# Patient Record
Sex: FEMALE | Race: WHITE | NOT HISPANIC OR LATINO | Employment: OTHER | URBAN - METROPOLITAN AREA
[De-identification: names, ages, dates, MRNs, and addresses within clinical notes are randomized per-mention and may not be internally consistent; named-entity substitution may affect disease eponyms.]

---

## 2017-02-02 ENCOUNTER — APPOINTMENT (OUTPATIENT)
Dept: RADIOLOGY | Facility: MEDICAL CENTER | Age: 67
End: 2017-02-02
Attending: EMERGENCY MEDICINE
Payer: COMMERCIAL

## 2017-02-02 ENCOUNTER — HOSPITAL ENCOUNTER (EMERGENCY)
Facility: MEDICAL CENTER | Age: 67
End: 2017-02-02
Attending: EMERGENCY MEDICINE
Payer: COMMERCIAL

## 2017-02-02 VITALS
HEART RATE: 105 BPM | WEIGHT: 150 LBS | TEMPERATURE: 97.7 F | HEIGHT: 65 IN | BODY MASS INDEX: 24.99 KG/M2 | OXYGEN SATURATION: 93 %

## 2017-02-02 DIAGNOSIS — F10.920 ACUTE ALCOHOL INTOXICATION, UNCOMPLICATED (HCC): ICD-10-CM

## 2017-02-02 LAB
ALBUMIN SERPL BCP-MCNC: 4.2 G/DL (ref 3.2–4.9)
ALBUMIN/GLOB SERPL: 1.1 G/DL
ALP SERPL-CCNC: 108 U/L (ref 30–99)
ALT SERPL-CCNC: 28 U/L (ref 2–50)
AMMONIA PLAS-SCNC: 30 UMOL/L (ref 11–45)
ANION GAP SERPL CALC-SCNC: 17 MMOL/L (ref 0–11.9)
AST SERPL-CCNC: 47 U/L (ref 12–45)
BASOPHILS # BLD AUTO: 0.8 % (ref 0–1.8)
BASOPHILS # BLD: 0.1 K/UL (ref 0–0.12)
BILIRUB SERPL-MCNC: 0.4 MG/DL (ref 0.1–1.5)
BUN SERPL-MCNC: 29 MG/DL (ref 8–22)
CALCIUM SERPL-MCNC: 8.9 MG/DL (ref 8.5–10.5)
CHLORIDE SERPL-SCNC: 104 MMOL/L (ref 96–112)
CO2 SERPL-SCNC: 16 MMOL/L (ref 20–33)
CREAT SERPL-MCNC: 1.2 MG/DL (ref 0.5–1.4)
EOSINOPHIL # BLD AUTO: 0.21 K/UL (ref 0–0.51)
EOSINOPHIL NFR BLD: 1.7 % (ref 0–6.9)
ERYTHROCYTE [DISTWIDTH] IN BLOOD BY AUTOMATED COUNT: 43.8 FL (ref 35.9–50)
ETHANOL BLD-MCNC: 0.34 G/DL
FLUAV H1 2009 PAND RNA SPEC QL NAA+PROBE: NOT DETECTED
FLUAV RNA SPEC QL NAA+PROBE: NEGATIVE
FLUAV+FLUBV AG SPEC QL IA: NORMAL
FLUBV RNA SPEC QL NAA+PROBE: NEGATIVE
GFR SERPL CREATININE-BSD FRML MDRD: 45 ML/MIN/1.73 M 2
GLOBULIN SER CALC-MCNC: 3.8 G/DL (ref 1.9–3.5)
GLUCOSE SERPL-MCNC: 86 MG/DL (ref 65–99)
HCT VFR BLD AUTO: 46.1 % (ref 37–47)
HGB BLD-MCNC: 15.8 G/DL (ref 12–16)
IMM GRANULOCYTES # BLD AUTO: 0.04 K/UL (ref 0–0.11)
IMM GRANULOCYTES NFR BLD AUTO: 0.3 % (ref 0–0.9)
LACTATE BLD-SCNC: 2 MMOL/L (ref 0.5–2)
LYMPHOCYTES # BLD AUTO: 5.04 K/UL (ref 1–4.8)
LYMPHOCYTES NFR BLD: 41 % (ref 22–41)
MCH RBC QN AUTO: 34.9 PG (ref 27–33)
MCHC RBC AUTO-ENTMCNC: 34.3 G/DL (ref 33.6–35)
MCV RBC AUTO: 101.8 FL (ref 81.4–97.8)
MONOCYTES # BLD AUTO: 0.93 K/UL (ref 0–0.85)
MONOCYTES NFR BLD AUTO: 7.6 % (ref 0–13.4)
NEUTROPHILS # BLD AUTO: 5.96 K/UL (ref 2–7.15)
NEUTROPHILS NFR BLD: 48.6 % (ref 44–72)
NRBC # BLD AUTO: 0 K/UL
NRBC BLD AUTO-RTO: 0 /100 WBC
PLATELET # BLD AUTO: 224 K/UL (ref 164–446)
PMV BLD AUTO: 10.3 FL (ref 9–12.9)
POTASSIUM SERPL-SCNC: 3.9 MMOL/L (ref 3.6–5.5)
PROT SERPL-MCNC: 8 G/DL (ref 6–8.2)
RBC # BLD AUTO: 4.53 M/UL (ref 4.2–5.4)
SIGNIFICANT IND 70042: NORMAL
SITE SITE: NORMAL
SODIUM SERPL-SCNC: 137 MMOL/L (ref 135–145)
SOURCE SOURCE: NORMAL
TROPONIN I SERPL-MCNC: <0.01 NG/ML (ref 0–0.04)
WBC # BLD AUTO: 12.3 K/UL (ref 4.8–10.8)

## 2017-02-02 PROCEDURE — 82140 ASSAY OF AMMONIA: CPT

## 2017-02-02 PROCEDURE — 83605 ASSAY OF LACTIC ACID: CPT

## 2017-02-02 PROCEDURE — 99284 EMERGENCY DEPT VISIT MOD MDM: CPT

## 2017-02-02 PROCEDURE — 85025 COMPLETE CBC W/AUTO DIFF WBC: CPT

## 2017-02-02 PROCEDURE — 70450 CT HEAD/BRAIN W/O DYE: CPT

## 2017-02-02 PROCEDURE — 36415 COLL VENOUS BLD VENIPUNCTURE: CPT

## 2017-02-02 PROCEDURE — 71010 DX-CHEST-PORTABLE (1 VIEW): CPT

## 2017-02-02 PROCEDURE — 87503 INFLUENZA DNA AMP PROB ADDL: CPT

## 2017-02-02 PROCEDURE — 80053 COMPREHEN METABOLIC PANEL: CPT

## 2017-02-02 PROCEDURE — 80307 DRUG TEST PRSMV CHEM ANLYZR: CPT

## 2017-02-02 PROCEDURE — 87502 INFLUENZA DNA AMP PROBE: CPT

## 2017-02-02 PROCEDURE — 87400 INFLUENZA A/B EACH AG IA: CPT

## 2017-02-02 PROCEDURE — 84484 ASSAY OF TROPONIN QUANT: CPT

## 2017-02-02 ASSESSMENT — LIFESTYLE VARIABLES: DO YOU DRINK ALCOHOL: YES

## 2017-02-02 NOTE — ED AVS SNAPSHOT
After Visit Summary                                                                                                                Shannon Balbuena   MRN: [id number]    Department:  [facility], Emergency Dept   Date of Visit:  2/2/2017            [facility], Emergency Dept    [address]    [facility] [address]    Phone:  [phone]      You were seen by     Horacio Pina M.D.      Your Diagnosis Was     Acute alcohol intoxication, uncomplicated (CMS-HCC)     F10.120       Follow-up Information     1. Follow up with Edvin BREEN M.D..    Specialty:  Family Medicine    Contact information    [address]  [facility] [address] [phone]        Medication Information     Review all of your home medications and newly ordered medications with your primary doctor and/or pharmacist as soon as possible. Follow medication instructions as directed by your doctor and/or pharmacist.     Please keep your complete medication list with you and share with your physician. Update the information when medications are discontinued, doses are changed, or new medications (including over-the-counter products) are added; and carry medication information at all times in the event of emergency situations.               Medication List      ASK your doctor about these medications        Instructions    carvedilol 12.5 MG Tabs   Commonly known as:  COREG    Take 12.5 mg by mouth every morning.   Dose:  12.5 mg       ciprofloxacin 500 MG Tabs   Commonly known as:  CIPRO    Take 1 Tab by mouth 2 times a day.   Dose:  500 mg       diazepam 5 MG Tabs   Commonly known as:  VALIUM    Doctor's comments:  No Driving or alcohol with medication given.   Take 1 Tab by mouth every 6 hours as needed (muscle spasm).   Dose:  5 mg       ibuprofen 600 MG Tabs   Commonly known as:  MOTRIN    Take 1 Tab by mouth every 6 hours as needed.   Dose:  600 mg       levothyroxine 112 MCG Tabs      Commonly known as:  SYNTHROID    Take 1 Tab by mouth every day.   Dose:  112 mcg       oxycodone-acetaminophen 7.5-325 MG per tablet   Commonly known as:  PERCOCET    Doctor's comments:  No Driving or alcohol with medication given.   Take 1 Tab by mouth every four hours as needed.   Dose:  1 Tab       * prednisoLONE acetate 1 % Susp   Commonly known as:  PRED FORTE    Place 1 Drop in right eye 3 times a day.   Dose:  1 Drop       * prednisoLONE acetate 1 % Susp   Commonly known as:  PRED FORTE    Place 1 Drop in left eye 4 times a day.   Dose:  1 Drop       * Notice:  This list has 2 medication(s) that are the same as other medications prescribed for you. Read the directions carefully, and ask your doctor or other care provider to review them with you.            Procedures and tests performed during your visit     AMMONIA    CBC WITH DIFFERENTIAL    COMP METABOLIC PANEL    CT-HEAD W/O    DIAGNOSTIC ALCOHOL    DX-CHEST-PORTABLE (1 VIEW)    ESTIMATED GFR    IV Saline Lock    Influenza By PCR, A/B/H1N1    Influenza Rapid    LACTIC ACID    TROPONIN        Discharge Instructions       Alcohol Intoxication  Alcohol intoxication occurs when the amount of alcohol that a person has consumed impairs his or her ability to mentally and physically function. Alcohol directly impairs the normal chemical activity of the brain. Drinking large amounts of alcohol can lead to changes in mental function and behavior, and it can cause many physical effects that can be harmful.   Alcohol intoxication can range in severity from mild to very severe. Various factors can affect the level of intoxication that occurs, such as the person's age, gender, weight, frequency of alcohol consumption, and the presence of other medical conditions (such as diabetes, seizures, or heart conditions). Dangerous levels of alcohol intoxication may occur when people drink large amounts of alcohol in a short period (binge drinking). Alcohol can also be  "especially dangerous when combined with certain prescription medicines or \"recreational\" drugs.  SIGNS AND SYMPTOMS  Some common signs and symptoms of mild alcohol intoxication include:  · Loss of coordination.  · Changes in mood and behavior.  · Impaired judgment.  · Slurred speech.  As alcohol intoxication progresses to more severe levels, other signs and symptoms will appear. These may include:  · Vomiting.  · Confusion and impaired memory.  · Slowed breathing.  · Seizures.  · Loss of consciousness.  DIAGNOSIS   Your health care provider will take a medical history and perform a physical exam. You will be asked about the amount and type of alcohol you have consumed. Blood tests will be done to measure the concentration of alcohol in your blood. In many places, your blood alcohol level must be lower than 80 mg/dL (0.08%) to legally drive. However, many dangerous effects of alcohol can occur at much lower levels.   TREATMENT   People with alcohol intoxication often do not require treatment. Most of the effects of alcohol intoxication are temporary, and they go away as the alcohol naturally leaves the body. Your health care provider will monitor your condition until you are stable enough to go home. Fluids are sometimes given through an IV access tube to help prevent dehydration.   HOME CARE INSTRUCTIONS  · Do not drive after drinking alcohol.  · Stay hydrated. Drink enough water and fluids to keep your urine clear or pale yellow. Avoid caffeine.    · Only take over-the-counter or prescription medicines as directed by your health care provider.    SEEK MEDICAL CARE IF:   · You have persistent vomiting.    · You do not feel better after a few days.  · You have frequent alcohol intoxication. Your health care provider can help determine if you should see a substance use treatment counselor.  SEEK IMMEDIATE MEDICAL CARE IF:   · You become shaky or tremble when you try to stop drinking.    · You shake uncontrollably " (seizure).    · You throw up (vomit) blood. This may be bright red or may look like black coffee grounds.    · You have blood in your stool. This may be bright red or may appear as a black, tarry, bad smelling stool.    · You become lightheaded or faint.    MAKE SURE YOU:   · Understand these instructions.  · Will watch your condition.  · Will get help right away if you are not doing well or get worse.     This information is not intended to replace advice given to you by your health care provider. Make sure you discuss any questions you have with your health care provider.     Document Released: 09/27/2006 Document Revised: 08/20/2014 Document Reviewed: 05/23/2014  2[employer] Interactive Patient Education ©2016 [employer]            Patient Information     Patient Information    Following emergency treatment: all patient requiring follow-up care must return either to a private physician or a clinic if your condition worsens before you are able to obtain further medical attention, please return to the emergency room.     Billing Information    At [facility], we work to make the billing process streamlined for our patients.  Our Representatives are here to answer any questions you may have regarding your hospital bill.  If you have insurance coverage and have supplied your insurance information to us, we will submit a claim to your insurer on your behalf.  Should you have any questions regarding your bill, we can be reached online or by phone as follows:  Online: You are able pay your bills online or live chat with our representatives about any billing questions you may have. We are here to help Monday - Friday from 8:00am to 7:30pm and 9:00am - 12:00pm on Saturdays.  Please visit [email or web address]  for more information.   Phone:  [phone] or [phone]    Please note that your emergency physician, surgeon, pathologist, radiologist, anesthesiologist, and other  specialists are not employed by [facility] and will therefore bill separately for their services.  Please contact them directly for any questions concerning their bills at the numbers below:     Emergency Physician Services:  [phone]  [facility]:  [phone]  [facility]:  [phone]  [facility]:  [phone]    1. Your final bill may vary from the amount quoted upon discharge if all procedures are not complete at that time, or if your doctor has additional procedures of which we are not aware. You will receive an additional bill if you return to the Emergency Department at [facility] for suture removal regardless of the facility of which the sutures were placed.     2. Please arrange for settlement of this account at the emergency registration.    3. All self-pay accounts are due in full at the time of treatment.  If you are unable to meet this obligation then payment is expected within 4-5 days.     4. If you have had radiology studies (CT, X-ray, Ultrasound, MRI), you have received a preliminary result during your emergency department visit. Please contact the radiology department [phone] to receive a copy of your final result. Please discuss the Final result with your primary physician or with the follow up physician provided.     Crisis Hotline:  [place] Crisis Hotline:  2-014-ZMXOWZU or 1-497.214.1140  [place] Crisis Hotline:    1-156.644.1237 or 534-822-6955         ED Discharge Follow Up Questions    1. In order to provide you with very good care, we would like to follow up with a phone call in the next few days.  May we have your permission to contact you?     YES /  NO    2. What is the best phone number to call you? (       )_____-__________    3. What is the best time to call you?      Morning  /  Afternoon  /  Evening                   Patient Signature:  ____________________________________________________________    Date:   ____________________________________________________________

## 2017-02-02 NOTE — ED AVS SNAPSHOT
Thumb Reading Access Code: [email or web address]  Expires: 3/4/2017 11:23 PM    Thumb Reading  A secure, online tool to manage your health information     [employer]’s Thumb Reading® is a secure, online tool that connects you to your personalized health information from the privacy of your home -- day or night - making it very easy for you to manage your healthcare. Once the activation process is completed, you can even access your medical information using the Thumb Reading yvette, which is available for free in the Apple Yvette store or Google Play store.     Thumb Reading provides the following levels of access (as shown below):   My Chart Features   [facility] Primary Care Doctor [facility]  Specialists [facility]  Urgent  Care Non-[facility]  Primary Care  Doctor   Email your healthcare team securely and privately 24/7 X X X X   Manage appointments: schedule your next appointment; view details of past/upcoming appointments X      Request prescription refills. X      View recent personal medical records, including lab and immunizations X X X X   View health record, including health history, allergies, medications X X X X   Read reports about your outpatient visits, procedures, consult and ER notes X X X X   See your discharge summary, which is a recap of your hospital and/or ER visit that includes your diagnosis, lab results, and care plan. X X       How to register for Thumb Reading:  1. Go to  [email or web address].  2. Click on the Sign Up Now box, which takes you to the New Member Sign Up page. You will need to provide the following information:  a. Enter your Thumb Reading Access Code exactly as it appears at the top of this page. (You will not need to use this code after you’ve completed the sign-up process. If you do not sign up before the expiration date, you must request a new code.)   b. Enter your date of birth.   c. Enter your home email address.   d. Click Submit, and follow the next screen’s instructions.  3. Create a Thumb Reading ID. This will be your Thumb Reading  login ID and cannot be changed, so think of one that is secure and easy to remember.  4. Create a Modbook password. You can change your password at any time.  5. Enter your Password Reset Question and Answer. This can be used at a later time if you forget your password.   6. Enter your e-mail address. This allows you to receive e-mail notifications when new information is available in Modbook.  7. Click Sign Up. You can now view your health information.    For assistance activating your Modbook account, call [phone]

## 2017-02-02 NOTE — ED AVS SNAPSHOT
2/2/2017          Shannon Balbuena  [address] [place] [address]  [place] [address]    Dear Shannon:    [facility] wants to ensure your discharge home is safe and you or your loved ones have had all your questions answered regarding your care after you leave the hospital.    You may receive a telephone call within two days of your discharge.  This call is to make certain you understand your discharge instructions as well as ensure we provided you with the best care possible during your stay with us.     The call will only last approximately 3-5 minutes and will be done by a nurse.    Once again, we want to ensure your discharge home is safe and that you have a clear understanding of any next steps in your care.  If you have any questions or concerns, please do not hesitate to contact us, we are here for you.  Thank you for choosing [facility] for your healthcare needs.    Sincerely,    Nolberto Santoyo    [facility]

## 2017-02-03 NOTE — ED PROVIDER NOTES
"ED Provider Note    Scribed for Dr. Horacio Pina M.D. by Ramonita Chicas. 2/2/2017  9:12 PM    Primary care provider: Edvin BREEN M.D.  Means of arrival: Ambulance  History obtained from: Patient, EMS  History limited by: ALOC    CHIEF COMPLAINT  Chief Complaint   Patient presents with   • ALOC     Had about 6 shots then became nonverbal and confused   • Alcohol Intoxication       HPI  Shannon Balbuean is a 66 y.o. female who presents to the Emergency Department in an altered level of consciousness. Per report, the patient had about 6 shots and then became very confused. Upon evaluation, the patient states, \"it's ok\" in response to most questions. She denies shortness of breath. She states that she does not think she is confused. When asked if she knows where she is, the patient states, \"I'm right here\". she denies pain.  reports that the patient began vomiting tonight. She became confused quite suddenly.  does not think that the patient had a seizure. Per , the patient is normally alert and oriented x4 at baseline. She has not been sick recently.     Further details of the HPI are limited secondary to the altered level of consciousness of the patient.      REVIEW OF SYSTEMS  Pertinent positives include altered level of consciousness, vomiting. Pertinent negatives include no pain. See HPI for further details.     Further details of the ROS are limited secondary to the altered level of consciousness of the patient.      PAST MEDICAL HISTORY   has a past medical history of Thyroid disease; Arthritis; Hepatitis C; Unspecified cataract; Seasonal allergies; HTN (hypertension); High cholesterol; and At high risk for falls.    SURGICAL HISTORY   has past surgical history that includes breast biopsy; thyroidectomy; hip arthroplasty total (2007); cataract phaco with iol (6/25/2014); and cataract phaco with iol (7/9/2014).    SOCIAL HISTORY  Social History   Substance Use Topics   • " "Smoking status: Former Smoker -- 1.00 packs/day     Quit date: 04/01/2013   • Smokeless tobacco: Never Used   • Alcohol Use: Yes      Comment: 6x week      History   Drug Use No       FAMILY HISTORY  Noncontributory.      CURRENT MEDICATIONS  Home Medications     Reviewed by Enma Wang R.N. (Registered Nurse) on 02/02/17 at 2043  Med List Status: Unable to Obtain    Medication Last Dose Status    carvedilol (COREG) 12.5 MG TABS Not Taking Active    ciprofloxacin (CIPRO) 500 MG TABS Not Taking Active    diazepam (VALIUM) 5 MG TABS  Active    ibuprofen (MOTRIN) 600 MG TABS  Active    levothyroxine (SYNTHROID) 112 MCG TABS Taking Active    oxycodone-acetaminophen (PERCOCET) 7.5-325 MG per tablet  Active    prednisoLONE acetate (PRED FORTE) 1 % SUSP Not Taking Active    prednisoLONE acetate (PRED FORTE) 1 % SUSP Not Taking Active                ALLERGIES  Allergies   Allergen Reactions   • Codeine Rash       PHYSICAL EXAM  VITAL SIGNS: Temp(Src) 36.5 °C (97.7 °F)  Ht 1.651 m (5' 5\")  Wt 68.04 kg (150 lb)  BMI 24.96 kg/m2  SpO2 91%    Constitutional: Well developed, Well nourished, No distress, Non-toxic appearance.   HENT: Normocephalic, Atraumatic, Bilateral external ears normal, Oropharynx moist, No oral exudates.   Eyes: PERRLA, EOMI, Conjunctiva normal, No discharge.   Neck: No tenderness, Supple, No stridor.   Lymphatic: No lymphadenopathy noted.   Cardiovascular: Normal heart rate, Normal rhythm.   Thorax & Lungs: Coarse breath sounds and rhonchi in the right upper lobe. No respiratory distress, No wheezing, No crackles.   Abdomen: Soft, No tenderness, No masses, No pulsatile masses.   Skin: Warm, Dry, No erythema, No rash.   Extremities:, No edema No cyanosis.   Musculoskeletal: No tenderness to palpation or major deformities noted.  Intact distal pulses  Neurologic: Awake, alert. Confused to time and place. Moves all extremities spontaneously.  Psychiatric: Unable to assess.     LABS  Results for " orders placed or performed during the hospital encounter of 02/02/17   CBC WITH DIFFERENTIAL   Result Value Ref Range    WBC 12.3 (H) 4.8 - 10.8 K/uL    RBC 4.53 4.20 - 5.40 M/uL    Hemoglobin 15.8 12.0 - 16.0 g/dL    Hematocrit 46.1 37.0 - 47.0 %    .8 (H) 81.4 - 97.8 fL    MCH 34.9 (H) 27.0 - 33.0 pg    MCHC 34.3 33.6 - 35.0 g/dL    RDW 43.8 35.9 - 50.0 fL    Platelet Count 224 164 - 446 K/uL    MPV 10.3 9.0 - 12.9 fL    Neutrophils-Polys 48.60 44.00 - 72.00 %    Lymphocytes 41.00 22.00 - 41.00 %    Monocytes 7.60 0.00 - 13.40 %    Eosinophils 1.70 0.00 - 6.90 %    Basophils 0.80 0.00 - 1.80 %    Immature Granulocytes 0.30 0.00 - 0.90 %    Nucleated RBC 0.00 /100 WBC    Neutrophils (Absolute) 5.96 2.00 - 7.15 K/uL    Lymphs (Absolute) 5.04 (H) 1.00 - 4.80 K/uL    Monos (Absolute) 0.93 (H) 0.00 - 0.85 K/uL    Eos (Absolute) 0.21 0.00 - 0.51 K/uL    Baso (Absolute) 0.10 0.00 - 0.12 K/uL    Immature Granulocytes (abs) 0.04 0.00 - 0.11 K/uL    NRBC (Absolute) 0.00 K/uL   COMP METABOLIC PANEL   Result Value Ref Range    Sodium 137 135 - 145 mmol/L    Potassium 3.9 3.6 - 5.5 mmol/L    Chloride 104 96 - 112 mmol/L    Co2 16 (L) 20 - 33 mmol/L    Anion Gap 17.0 (H) 0.0 - 11.9    Glucose 86 65 - 99 mg/dL    Bun 29 (H) 8 - 22 mg/dL    Creatinine 1.20 0.50 - 1.40 mg/dL    Calcium 8.9 8.5 - 10.5 mg/dL    AST(SGOT) 47 (H) 12 - 45 U/L    ALT(SGPT) 28 2 - 50 U/L    Alkaline Phosphatase 108 (H) 30 - 99 U/L    Total Bilirubin 0.4 0.1 - 1.5 mg/dL    Albumin 4.2 3.2 - 4.9 g/dL    Total Protein 8.0 6.0 - 8.2 g/dL    Globulin 3.8 (H) 1.9 - 3.5 g/dL    A-G Ratio 1.1 g/dL   TROPONIN   Result Value Ref Range    Troponin I <0.01 0.00 - 0.04 ng/mL   Influenza Rapid   Result Value Ref Range    Significant Indicator NEG     Source RESP     Site RESPIRATORY     Rapid Influenza A-B       Negative for Influenza A and Influenza B antigens.  Infection due to influenza A or B cannot be ruled out  since the antigen present in the specimen  may be below the  detection limit of the test. Culture confirmation of  negative samples is recommended.     LACTIC ACID   Result Value Ref Range    Lactic Acid 2.0 0.5 - 2.0 mmol/L   AMMONIA   Result Value Ref Range    Ammonia 30 11 - 45 umol/L   DIAGNOSTIC ALCOHOL   Result Value Ref Range    Diagnostic Alcohol 0.34 (H) 0.00 g/dL   ESTIMATED GFR   Result Value Ref Range    GFR If  54 (A) >60 mL/min/1.73 m 2    GFR If Non African American 45 (A) >60 mL/min/1.73 m 2   All labs reviewed by me.    RADIOLOGY  CT-HEAD W/O   Final Result      1.  Diffuse atrophy and white matter changes.   2.  No acute intracranial hemorrhage or territorial infarct.         DX-CHEST-PORTABLE (1 VIEW)   Final Result      Hypoinflation without other evidence for acute cardiopulmonary disease.      The radiologist's interpretation of all radiological studies have been reviewed by me.    COURSE & MEDICAL DECISION MAKING  Pertinent Labs & Imaging studies reviewed. (See chart for details)    9:12 PM - Patient seen and examined at bedside. Ordered for a head CT, chest XR, and labs to evaluate her symptoms. The differential diagnoses include but are not limited to: aspiration pneumonia, alcohol intoxication. The plan of care was discussed with the patient's  and I answered all of his questions at this time. The patient's  understands and is agreeable with this plan of care.      10:24 PM Patient reevaluated at bedside. Discussed that the patient is quite intoxicated. Per , she quit smoking about 5 years ago. The patient has no chronic lung problems. She will remain for observation.     11:17 PM  requests to take the patient home. She will be road tested prior to discharge. Discussed plan for discharge; I advised the patient to return to the [facility] ED with any new or worsening symptoms. Patient was given the opportunity for questions. I addressed all questions or concerns at this time and they verbalize  "agreement to the plan of care. Review of vital signs: Pulse 105  Temp(Src) 36.5 °C (97.7 °F)  Ht 1.651 m (5' 5\")  Wt 68.04 kg (150 lb)  BMI 24.96 kg/m2  SpO2 93%     Decision Making:  Think this patient's altered mental status secondary to alcohol intoxication and she does not appear to have any acute medical illness at this time she is gradually improving from a mental status standpoint. Initially had some concern she might have aspirated however chest x-ray is clear she remains non-hypoxic with no cough or any symptoms of aspiration pneumonia at this point think she can be discharged home with her  given instructions related to alcohol abuse    The patient will return for new or worsening symptoms and is stable at the time of discharge.    The patient is referred to a primary physician for blood pressure management, diabetic screening, and for all other preventative health concerns.    DISPOSITION:  Patient will be discharged home in stable condition.    FOLLOW UP:  Edvin BREEN M.D.  [address] [facility] [address]  [phone]        FINAL IMPRESSION  1. Acute alcohol intoxication, uncomplicated (CMS-[facility])       Ramonita GROVER (Kaydenibbull), am scribing for, and in the presence of, Horacio Pina M.D..    Electronically signed by: Ramonita Chicas ([place]), 2/2/2017    Horacio GROVER M.D. personally performed the services described in this documentation, as scribed by Ramonita Chicas in my presence, and it is both accurate and complete.    The note accurately reflects work and decisions made by me.  Horacio Pina  2/3/2017  2:03 AM      "

## 2017-02-03 NOTE — ED NOTES
Pt given discharge instructions, follow up care. Pt verbalized understanding. RN to answer and questions pt and family had. Pt instructed not to drive or operate heavy machinery after drinking alcohol. VSS. Pt ambulated out to front lobby.

## 2017-02-03 NOTE — ED NOTES
Chief Complaint   Patient presents with   • ALOC     Had about 6 shots then became nonverbal and confused     Pt AOxself only, mildly slurred speech. Neuro assessment intact,  and strength equal and strong bilaterally. Significant other at bedside and is historian. Pt placed on monitor and PIV established . Chart up for ERP.

## 2017-02-03 NOTE — ED NOTES
Pt resting comfortably on gurney. Pt with no changes from previous assessments. Significant other at bedside. Respirations are even and unlabored. Bed in lowest position, call light within reach. Pt with no further needs at this time.

## 2017-02-03 NOTE — DISCHARGE INSTRUCTIONS
"Alcohol Intoxication  Alcohol intoxication occurs when the amount of alcohol that a person has consumed impairs his or her ability to mentally and physically function. Alcohol directly impairs the normal chemical activity of the brain. Drinking large amounts of alcohol can lead to changes in mental function and behavior, and it can cause many physical effects that can be harmful.   Alcohol intoxication can range in severity from mild to very severe. Various factors can affect the level of intoxication that occurs, such as the person's age, gender, weight, frequency of alcohol consumption, and the presence of other medical conditions (such as diabetes, seizures, or heart conditions). Dangerous levels of alcohol intoxication may occur when people drink large amounts of alcohol in a short period (binge drinking). Alcohol can also be especially dangerous when combined with certain prescription medicines or \"recreational\" drugs.  SIGNS AND SYMPTOMS  Some common signs and symptoms of mild alcohol intoxication include:  · Loss of coordination.  · Changes in mood and behavior.  · Impaired judgment.  · Slurred speech.  As alcohol intoxication progresses to more severe levels, other signs and symptoms will appear. These may include:  · Vomiting.  · Confusion and impaired memory.  · Slowed breathing.  · Seizures.  · Loss of consciousness.  DIAGNOSIS   Your health care provider will take a medical history and perform a physical exam. You will be asked about the amount and type of alcohol you have consumed. Blood tests will be done to measure the concentration of alcohol in your blood. In many places, your blood alcohol level must be lower than 80 mg/dL (0.08%) to legally drive. However, many dangerous effects of alcohol can occur at much lower levels.   TREATMENT   People with alcohol intoxication often do not require treatment. Most of the effects of alcohol intoxication are temporary, and they go away as the alcohol naturally " leaves the body. Your health care provider will monitor your condition until you are stable enough to go home. Fluids are sometimes given through an IV access tube to help prevent dehydration.   HOME CARE INSTRUCTIONS  · Do not drive after drinking alcohol.  · Stay hydrated. Drink enough water and fluids to keep your urine clear or pale yellow. Avoid caffeine.    · Only take over-the-counter or prescription medicines as directed by your health care provider.    SEEK MEDICAL CARE IF:   · You have persistent vomiting.    · You do not feel better after a few days.  · You have frequent alcohol intoxication. Your health care provider can help determine if you should see a substance use treatment counselor.  SEEK IMMEDIATE MEDICAL CARE IF:   · You become shaky or tremble when you try to stop drinking.    · You shake uncontrollably (seizure).    · You throw up (vomit) blood. This may be bright red or may look like black coffee grounds.    · You have blood in your stool. This may be bright red or may appear as a black, tarry, bad smelling stool.    · You become lightheaded or faint.    MAKE SURE YOU:   · Understand these instructions.  · Will watch your condition.  · Will get help right away if you are not doing well or get worse.     This information is not intended to replace advice given to you by your health care provider. Make sure you discuss any questions you have with your health care provider.     Document Released: 09/27/2006 Document Revised: 08/20/2014 Document Reviewed: 05/23/2014  [employer] Interactive Patient Education ©2016 [employer]

## 2017-02-03 NOTE — ED NOTES
Pt back in room from imaging. Pt resting comfortably on gurney. Pt with no changes from previous assessments. Respirations are even and unlabored. Bed in lowest position, call light within reach. Pt with no further needs at this time.

## 2017-03-01 ENCOUNTER — APPOINTMENT (OUTPATIENT)
Dept: RADIOLOGY | Facility: MEDICAL CENTER | Age: 67
DRG: 356 | End: 2017-03-01
Attending: INTERNAL MEDICINE
Payer: COMMERCIAL

## 2017-03-01 ENCOUNTER — RESOLUTE PROFESSIONAL BILLING HOSPITAL PROF FEE (OUTPATIENT)
Dept: HOSPITALIST | Facility: MEDICAL CENTER | Age: 67
End: 2017-03-01
Payer: COMMERCIAL

## 2017-03-01 ENCOUNTER — HOSPITAL ENCOUNTER (INPATIENT)
Facility: MEDICAL CENTER | Age: 67
LOS: 6 days | DRG: 356 | End: 2017-03-07
Attending: EMERGENCY MEDICINE | Admitting: INTERNAL MEDICINE
Payer: COMMERCIAL

## 2017-03-01 ENCOUNTER — APPOINTMENT (OUTPATIENT)
Dept: RADIOLOGY | Facility: MEDICAL CENTER | Age: 67
DRG: 356 | End: 2017-03-01
Attending: EMERGENCY MEDICINE
Payer: COMMERCIAL

## 2017-03-01 ENCOUNTER — APPOINTMENT (OUTPATIENT)
Dept: RADIOLOGY | Facility: MEDICAL CENTER | Age: 67
DRG: 356 | End: 2017-03-01
Attending: HOSPITALIST
Payer: COMMERCIAL

## 2017-03-01 DIAGNOSIS — K29.61 GASTROINTESTINAL HEMORRHAGE ASSOCIATED WITH OTHER GASTRITIS: ICD-10-CM

## 2017-03-01 DIAGNOSIS — K92.2 ACUTE GI BLEEDING: ICD-10-CM

## 2017-03-01 DIAGNOSIS — D69.6 THROMBOCYTOPENIA (HCC): ICD-10-CM

## 2017-03-01 DIAGNOSIS — R79.89 ELEVATED TROPONIN: ICD-10-CM

## 2017-03-01 DIAGNOSIS — R57.9 SHOCK (HCC): ICD-10-CM

## 2017-03-01 DIAGNOSIS — E87.20 LACTIC ACIDOSIS: ICD-10-CM

## 2017-03-01 DIAGNOSIS — R79.1 ELEVATED INR: ICD-10-CM

## 2017-03-01 DIAGNOSIS — D62 ANEMIA DUE TO BLOOD LOSS, ACUTE: ICD-10-CM

## 2017-03-01 DIAGNOSIS — N17.9 ACUTE RENAL FAILURE, UNSPECIFIED ACUTE RENAL FAILURE TYPE (HCC): ICD-10-CM

## 2017-03-01 PROBLEM — I95.9 HYPOTENSION: Status: ACTIVE | Noted: 2017-03-01

## 2017-03-01 PROBLEM — D64.9 SYMPTOMATIC ANEMIA: Status: ACTIVE | Noted: 2017-03-01

## 2017-03-01 LAB
ABO GROUP BLD: NORMAL
ABO GROUP BLD: NORMAL
ALBUMIN SERPL BCP-MCNC: 3.4 G/DL (ref 3.2–4.9)
ALBUMIN/GLOB SERPL: 0.9 G/DL
ALP SERPL-CCNC: 76 U/L (ref 30–99)
ALT SERPL-CCNC: 13 U/L (ref 2–50)
ANION GAP SERPL CALC-SCNC: 11 MMOL/L (ref 0–11.9)
ANION GAP SERPL CALC-SCNC: 31 MMOL/L (ref 0–11.9)
APPEARANCE UR: ABNORMAL
APTT PPP: 23.5 SEC (ref 24.7–36)
AST SERPL-CCNC: 23 U/L (ref 12–45)
BACTERIA #/AREA URNS HPF: ABNORMAL /HPF
BARCODED ABORH UBTYP: 1700
BARCODED ABORH UBTYP: 5100
BARCODED ABORH UBTYP: 600
BARCODED ABORH UBTYP: 6200
BARCODED ABORH UBTYP: 7300
BARCODED ABORH UBTYP: 8400
BARCODED ABORH UBTYP: 9500
BARCODED PRD CODE UBPRD: NORMAL
BARCODED UNIT NUM UBUNT: NORMAL
BASE EXCESS BLDA CALC-SCNC: -13 MMOL/L (ref -4–3)
BASE EXCESS BLDA CALC-SCNC: -19 MMOL/L (ref -4–3)
BASE EXCESS BLDA CALC-SCNC: -9 MMOL/L (ref -4–3)
BASE EXCESS BLDV CALC-SCNC: -18 MMOL/L
BASOPHILS # BLD AUTO: 0 % (ref 0–1.8)
BASOPHILS # BLD AUTO: 0 % (ref 0–1.8)
BASOPHILS # BLD AUTO: 0.1 % (ref 0–1.8)
BASOPHILS # BLD AUTO: 0.2 % (ref 0–1.8)
BASOPHILS # BLD: 0 K/UL (ref 0–0.12)
BASOPHILS # BLD: 0 K/UL (ref 0–0.12)
BASOPHILS # BLD: 0.02 K/UL (ref 0–0.12)
BASOPHILS # BLD: 0.04 K/UL (ref 0–0.12)
BILIRUB SERPL-MCNC: 0.5 MG/DL (ref 0.1–1.5)
BILIRUB UR QL STRIP.AUTO: NEGATIVE
BLD GP AB SCN SERPL QL: NORMAL
BNP SERPL-MCNC: 116 PG/ML (ref 0–100)
BODY TEMPERATURE: ABNORMAL CENTIGRADE
BODY TEMPERATURE: ABNORMAL DEGREES
BUN SERPL-MCNC: 61 MG/DL (ref 8–22)
BUN SERPL-MCNC: 98 MG/DL (ref 8–22)
CA-I BLD ISE-SCNC: 0.45 MMOL/L (ref 1.1–1.3)
CA-I BLD ISE-SCNC: 1.03 MMOL/L (ref 1.1–1.3)
CA-I SERPL-SCNC: 0.9 MMOL/L (ref 1.1–1.3)
CALCIUM SERPL-MCNC: 10 MG/DL (ref 8.5–10.5)
CALCIUM SERPL-MCNC: 7 MG/DL (ref 8.5–10.5)
CFT BLD TEG: 4 MIN (ref 5–10)
CFT BLD TEG: 7.2 MIN (ref 5–10)
CFT P HPASE BLD TEG: 3.8 MIN (ref 5–10)
CHLORIDE SERPL-SCNC: 100 MMOL/L (ref 96–112)
CHLORIDE SERPL-SCNC: 119 MMOL/L (ref 96–112)
CLOT ANGLE BLD TEG: 63.9 DEGREES (ref 53–72)
CLOT ANGLE BLD TEG: 67.3 DEGREES (ref 53–72)
CLOT ANGLE P HPASE BLD TEG: 68.1 DEGREES (ref 53–72)
CLOT INIT P HPASE BLD TEG: 1.6 MIN (ref 1–3)
CLOT LYSIS 30M P MA LENFR BLD TEG: 0 % (ref 0–8)
CLOT LYSIS 30M P MA LENFR BLD TEG: 0.9 % (ref 0–8)
CLOT LYSIS 30M P MA LENFR BLD TEG: 7 % (ref 0–8)
CO2 BLDA-SCNC: 11 MMOL/L (ref 20–33)
CO2 BLDA-SCNC: 13 MMOL/L (ref 20–33)
CO2 BLDA-SCNC: 18 MMOL/L (ref 20–33)
CO2 SERPL-SCNC: 18 MMOL/L (ref 20–33)
CO2 SERPL-SCNC: 7 MMOL/L (ref 20–33)
COLOR UR: YELLOW
COMPONENT FT 8504FT: NORMAL
COMPONENT P 8504P: NORMAL
COMPONENT R 8504R: NORMAL
CREAT SERPL-MCNC: 1.35 MG/DL (ref 0.5–1.4)
CREAT SERPL-MCNC: 2.67 MG/DL (ref 0.5–1.4)
CT.EXTRINSIC BLD ROTEM: 1.7 MIN (ref 1–3)
CT.EXTRINSIC BLD ROTEM: 2 MIN (ref 1–3)
CULTURE IF INDICATED INDCX: NO UA CULTURE
EKG IMPRESSION: NORMAL
EOSINOPHIL # BLD AUTO: 0 K/UL (ref 0–0.51)
EOSINOPHIL # BLD AUTO: 0.01 K/UL (ref 0–0.51)
EOSINOPHIL NFR BLD: 0 % (ref 0–6.9)
EOSINOPHIL NFR BLD: 0.1 % (ref 0–6.9)
EPI CELLS #/AREA URNS HPF: ABNORMAL /HPF
ERYTHROCYTE [DISTWIDTH] IN BLOOD BY AUTOMATED COUNT: 45.7 FL (ref 35.9–50)
ERYTHROCYTE [DISTWIDTH] IN BLOOD BY AUTOMATED COUNT: 46.2 FL (ref 35.9–50)
ERYTHROCYTE [DISTWIDTH] IN BLOOD BY AUTOMATED COUNT: 46.6 FL (ref 35.9–50)
ERYTHROCYTE [DISTWIDTH] IN BLOOD BY AUTOMATED COUNT: 53.1 FL (ref 35.9–50)
ERYTHROCYTE [DISTWIDTH] IN BLOOD BY AUTOMATED COUNT: 53.7 FL (ref 35.9–50)
ERYTHROCYTE [DISTWIDTH] IN BLOOD BY AUTOMATED COUNT: 57.1 FL (ref 35.9–50)
GFR SERPL CREATININE-BSD FRML MDRD: 18 ML/MIN/1.73 M 2
GFR SERPL CREATININE-BSD FRML MDRD: 39 ML/MIN/1.73 M 2
GLOBULIN SER CALC-MCNC: 3.8 G/DL (ref 1.9–3.5)
GLUCOSE BLD-MCNC: 159 MG/DL (ref 65–99)
GLUCOSE SERPL-MCNC: 197 MG/DL (ref 65–99)
GLUCOSE SERPL-MCNC: 218 MG/DL (ref 65–99)
GLUCOSE UR STRIP.AUTO-MCNC: NEGATIVE MG/DL
HCO3 BLDA-SCNC: 10.1 MMOL/L (ref 17–25)
HCO3 BLDA-SCNC: 11.8 MMOL/L (ref 17–25)
HCO3 BLDA-SCNC: 17.3 MMOL/L (ref 17–25)
HCO3 BLDV-SCNC: 10 MMOL/L (ref 24–28)
HCT VFR BLD AUTO: 13.8 % (ref 37–47)
HCT VFR BLD AUTO: 20.4 % (ref 37–47)
HCT VFR BLD AUTO: 32.2 % (ref 37–47)
HCT VFR BLD AUTO: 36.1 % (ref 37–47)
HCT VFR BLD AUTO: 37.6 % (ref 37–47)
HCT VFR BLD AUTO: 43.6 % (ref 37–47)
HCT VFR BLD CALC: 15 % (ref 37–47)
HCT VFR BLD CALC: 22 % (ref 37–47)
HGB BLD-MCNC: 10.2 G/DL (ref 12–16)
HGB BLD-MCNC: 12 G/DL (ref 12–16)
HGB BLD-MCNC: 12.7 G/DL (ref 12–16)
HGB BLD-MCNC: 14.3 G/DL (ref 12–16)
HGB BLD-MCNC: 14.3 G/DL (ref 12–16)
HGB BLD-MCNC: 4.3 G/DL (ref 12–16)
HGB BLD-MCNC: 5.1 G/DL (ref 12–16)
HGB BLD-MCNC: 6.4 G/DL (ref 12–16)
HGB BLD-MCNC: 7.5 G/DL (ref 12–16)
HYALINE CASTS #/AREA URNS LPF: ABNORMAL /LPF
IMM GRANULOCYTES # BLD AUTO: 0.05 K/UL (ref 0–0.11)
IMM GRANULOCYTES # BLD AUTO: 0.13 K/UL (ref 0–0.11)
IMM GRANULOCYTES # BLD AUTO: 0.16 K/UL (ref 0–0.11)
IMM GRANULOCYTES NFR BLD AUTO: 0.8 % (ref 0–0.9)
IMM GRANULOCYTES NFR BLD AUTO: 0.9 % (ref 0–0.9)
IMM GRANULOCYTES NFR BLD AUTO: 1 % (ref 0–0.9)
INR PPP: 1.47 (ref 0.87–1.13)
KETONES UR STRIP.AUTO-MCNC: ABNORMAL MG/DL
LACTATE BLD-SCNC: 1.5 MMOL/L (ref 0.5–2)
LACTATE BLD-SCNC: 14.6 MMOL/L (ref 0.5–2)
LACTATE BLD-SCNC: 2.9 MMOL/L (ref 0.5–2)
LEUKOCYTE ESTERASE UR QL STRIP.AUTO: NEGATIVE
LYMPHOCYTES # BLD AUTO: 0.64 K/UL (ref 1–4.8)
LYMPHOCYTES # BLD AUTO: 0.65 K/UL (ref 1–4.8)
LYMPHOCYTES # BLD AUTO: 1 K/UL (ref 1–4.8)
LYMPHOCYTES # BLD AUTO: 1.49 K/UL (ref 1–4.8)
LYMPHOCYTES NFR BLD: 13 % (ref 22–41)
LYMPHOCYTES NFR BLD: 25.7 % (ref 22–41)
LYMPHOCYTES NFR BLD: 3.8 % (ref 22–41)
LYMPHOCYTES NFR BLD: 5.9 % (ref 22–41)
MAGNESIUM SERPL-MCNC: 1 MG/DL (ref 1.5–2.5)
MANUAL DIFF BLD: NORMAL
MCF BLD TEG: 58.5 MM (ref 50–70)
MCF BLD TEG: 62.2 MM (ref 50–70)
MCF P HPASE BLD TEG: 63.3 MM (ref 50–70)
MCH RBC QN AUTO: 29.2 PG (ref 27–33)
MCH RBC QN AUTO: 29.6 PG (ref 27–33)
MCH RBC QN AUTO: 31.1 PG (ref 27–33)
MCH RBC QN AUTO: 31.7 PG (ref 27–33)
MCH RBC QN AUTO: 34.4 PG (ref 27–33)
MCH RBC QN AUTO: 35.2 PG (ref 27–33)
MCHC RBC AUTO-ENTMCNC: 30.7 G/DL (ref 33.6–35)
MCHC RBC AUTO-ENTMCNC: 31.4 G/DL (ref 33.6–35)
MCHC RBC AUTO-ENTMCNC: 31.7 G/DL (ref 33.6–35)
MCHC RBC AUTO-ENTMCNC: 32.6 G/DL (ref 33.6–35)
MCHC RBC AUTO-ENTMCNC: 33.2 G/DL (ref 33.6–35)
MCHC RBC AUTO-ENTMCNC: 33.8 G/DL (ref 33.6–35)
MCV RBC AUTO: 100 FL (ref 81.4–97.8)
MCV RBC AUTO: 112.1 FL (ref 81.4–97.8)
MCV RBC AUTO: 112.3 FL (ref 81.4–97.8)
MCV RBC AUTO: 88.9 FL (ref 81.4–97.8)
MCV RBC AUTO: 89.7 FL (ref 81.4–97.8)
MCV RBC AUTO: 91.9 FL (ref 81.4–97.8)
MICRO URNS: ABNORMAL
MONOCYTES # BLD AUTO: 0 K/UL (ref 0–0.85)
MONOCYTES # BLD AUTO: 0.26 K/UL (ref 0–0.85)
MONOCYTES # BLD AUTO: 0.99 K/UL (ref 0–0.85)
MONOCYTES # BLD AUTO: 1.31 K/UL (ref 0–0.85)
MONOCYTES NFR BLD AUTO: 0 % (ref 0–13.4)
MONOCYTES NFR BLD AUTO: 4.5 % (ref 0–13.4)
MONOCYTES NFR BLD AUTO: 5.9 % (ref 0–13.4)
MONOCYTES NFR BLD AUTO: 7.7 % (ref 0–13.4)
MORPHOLOGY BLD-IMP: NORMAL
NEUTROPHILS # BLD AUTO: 14.49 K/UL (ref 2–7.15)
NEUTROPHILS # BLD AUTO: 14.91 K/UL (ref 2–7.15)
NEUTROPHILS # BLD AUTO: 4 K/UL (ref 2–7.15)
NEUTROPHILS # BLD AUTO: 4.35 K/UL (ref 2–7.15)
NEUTROPHILS NFR BLD: 68.9 % (ref 44–72)
NEUTROPHILS NFR BLD: 84.4 % (ref 44–72)
NEUTROPHILS NFR BLD: 85.5 % (ref 44–72)
NEUTROPHILS NFR BLD: 89 % (ref 44–72)
NEUTS BAND NFR BLD MANUAL: 2.6 % (ref 0–10)
NITRITE UR QL STRIP.AUTO: NEGATIVE
NRBC # BLD AUTO: 0 K/UL
NRBC BLD AUTO-RTO: 0 /100 WBC
O2/TOTAL GAS SETTING VFR VENT: 100 %
O2/TOTAL GAS SETTING VFR VENT: 100 %
O2/TOTAL GAS SETTING VFR VENT: 32 %
PA AA BLD-ACNC: 16 %
PA ADP BLD-ACNC: 37.5 %
PCO2 BLDA: 24.7 MMHG (ref 26–37)
PCO2 BLDA: 38.7 MMHG (ref 26–37)
PCO2 BLDA: 38.9 MMHG (ref 26–37)
PCO2 BLDV: 33.2 MMHG (ref 41–51)
PCO2 TEMP ADJ BLDA: 24.7 MMHG (ref 26–37)
PCO2 TEMP ADJ BLDA: 37.2 MMHG (ref 26–37)
PCO2 TEMP ADJ BLDA: 38.6 MMHG (ref 26–37)
PH BLDA: 7.02 [PH] (ref 7.4–7.5)
PH BLDA: 7.26 [PH] (ref 7.4–7.5)
PH BLDA: 7.29 [PH] (ref 7.4–7.5)
PH BLDV: 7.11 [PH] (ref 7.31–7.45)
PH TEMP ADJ BLDA: 7.03 [PH] (ref 7.4–7.5)
PH TEMP ADJ BLDA: 7.27 [PH] (ref 7.4–7.5)
PH TEMP ADJ BLDA: 7.29 [PH] (ref 7.4–7.5)
PH UR STRIP.AUTO: 5.5 [PH]
PHOSPHATE SERPL-MCNC: 5.6 MG/DL (ref 2.5–4.5)
PLATELET # BLD AUTO: 100 K/UL (ref 164–446)
PLATELET # BLD AUTO: 148 K/UL (ref 164–446)
PLATELET # BLD AUTO: 149 K/UL (ref 164–446)
PLATELET # BLD AUTO: 149 K/UL (ref 164–446)
PLATELET # BLD AUTO: 165 K/UL (ref 164–446)
PLATELET # BLD AUTO: 96 K/UL (ref 164–446)
PMV BLD AUTO: 10.2 FL (ref 9–12.9)
PMV BLD AUTO: 10.3 FL (ref 9–12.9)
PMV BLD AUTO: 10.7 FL (ref 9–12.9)
PMV BLD AUTO: 11 FL (ref 9–12.9)
PMV BLD AUTO: 11.2 FL (ref 9–12.9)
PMV BLD AUTO: 9.8 FL (ref 9–12.9)
PO2 BLDA: 175 MMHG (ref 64–87)
PO2 BLDA: 470 MMHG (ref 64–87)
PO2 BLDA: 89 MMHG (ref 64–87)
PO2 BLDV: 33.4 MMHG (ref 25–40)
PO2 TEMP ADJ BLDA: 170 MMHG (ref 64–87)
PO2 TEMP ADJ BLDA: 469 MMHG (ref 64–87)
PO2 TEMP ADJ BLDA: 89 MMHG (ref 64–87)
POTASSIUM BLD-SCNC: 3.4 MMOL/L (ref 3.6–5.5)
POTASSIUM BLD-SCNC: 4.1 MMOL/L (ref 3.6–5.5)
POTASSIUM SERPL-SCNC: 3.4 MMOL/L (ref 3.6–5.5)
POTASSIUM SERPL-SCNC: 4.1 MMOL/L (ref 3.6–5.5)
PRODUCT TYPE UPROD: NORMAL
PROT SERPL-MCNC: 7.2 G/DL (ref 6–8.2)
PROT UR QL STRIP: 30 MG/DL
PROTHROMBIN TIME: 18.3 SEC (ref 12–14.6)
RBC # BLD AUTO: 1.22 M/UL (ref 4.2–5.4)
RBC # BLD AUTO: 1.82 M/UL (ref 4.2–5.4)
RBC # BLD AUTO: 3.22 M/UL (ref 4.2–5.4)
RBC # BLD AUTO: 4.06 M/UL (ref 4.2–5.4)
RBC # BLD AUTO: 4.09 M/UL (ref 4.2–5.4)
RBC # BLD AUTO: 4.86 M/UL (ref 4.2–5.4)
RBC # URNS HPF: ABNORMAL /HPF
RBC UR QL AUTO: ABNORMAL
RH BLD: NORMAL
SAO2 % BLDA: 100 % (ref 93–99)
SAO2 % BLDA: 96 % (ref 93–99)
SAO2 % BLDA: 99 % (ref 93–99)
SAO2 % BLDV: 61.6 %
SODIUM BLD-SCNC: 147 MMOL/L (ref 135–145)
SODIUM BLD-SCNC: 148 MMOL/L (ref 135–145)
SODIUM SERPL-SCNC: 138 MMOL/L (ref 135–145)
SODIUM SERPL-SCNC: 148 MMOL/L (ref 135–145)
SP GR UR STRIP.AUTO: 1.02
SPECIMEN DRAWN FROM PATIENT: ABNORMAL
TEG ALGORITHM TGALG: ABNORMAL
TEG ALGORITHM TGALG: NORMAL
TROPONIN I SERPL-MCNC: 0.22 NG/ML (ref 0–0.04)
TROPONIN I SERPL-MCNC: 0.25 NG/ML (ref 0–0.04)
TSH SERPL DL<=0.005 MIU/L-ACNC: 1.5 UIU/ML (ref 0.3–3.7)
TSH SERPL DL<=0.005 MIU/L-ACNC: 3.9 UIU/ML (ref 0.3–3.7)
UNIT STATUS USTAT: NORMAL
WBC # BLD AUTO: 16.8 K/UL (ref 4.8–10.8)
WBC # BLD AUTO: 17 K/UL (ref 4.8–10.8)
WBC # BLD AUTO: 5 K/UL (ref 4.8–10.8)
WBC # BLD AUTO: 5.8 K/UL (ref 4.8–10.8)
WBC # BLD AUTO: 8.4 K/UL (ref 4.8–10.8)
WBC # BLD AUTO: 9.5 K/UL (ref 4.8–10.8)
WBC #/AREA URNS HPF: ABNORMAL /HPF

## 2017-03-01 PROCEDURE — 31500 INSERT EMERGENCY AIRWAY: CPT

## 2017-03-01 PROCEDURE — 304561 HCHG STAT O2

## 2017-03-01 PROCEDURE — 37244 VASC EMBOLIZE/OCCLUDE BLEED: CPT

## 2017-03-01 PROCEDURE — B41B1ZZ FLUOROSCOPY OF OTHER INTRA-ABDOMINAL ARTERIES USING LOW OSMOLAR CONTRAST: ICD-10-PCS | Performed by: RADIOLOGY

## 2017-03-01 PROCEDURE — 304562 HCHG STAT O2 MASK/CANNULA

## 2017-03-01 PROCEDURE — P9016 RBC LEUKOCYTES REDUCED: HCPCS | Mod: 91

## 2017-03-01 PROCEDURE — 96367 TX/PROPH/DG ADDL SEQ IV INF: CPT

## 2017-03-01 PROCEDURE — 85384 FIBRINOGEN ACTIVITY: CPT

## 2017-03-01 PROCEDURE — 160048 HCHG OR STATISTICAL LEVEL 1-5: Performed by: INTERNAL MEDICINE

## 2017-03-01 PROCEDURE — 700105 HCHG RX REV CODE 258: Performed by: EMERGENCY MEDICINE

## 2017-03-01 PROCEDURE — 700111 HCHG RX REV CODE 636 W/ 250 OVERRIDE (IP): Performed by: INTERNAL MEDICINE

## 2017-03-01 PROCEDURE — 700101 HCHG RX REV CODE 250: Performed by: EMERGENCY MEDICINE

## 2017-03-01 PROCEDURE — 02HV33Z INSERTION OF INFUSION DEVICE INTO SUPERIOR VENA CAVA, PERCUTANEOUS APPROACH: ICD-10-PCS | Performed by: EMERGENCY MEDICINE

## 2017-03-01 PROCEDURE — 03HY32Z INSERTION OF MONITORING DEVICE INTO UPPER ARTERY, PERCUTANEOUS APPROACH: ICD-10-PCS | Performed by: INTERNAL MEDICINE

## 2017-03-01 PROCEDURE — 0BH18EZ INSERTION OF ENDOTRACHEAL AIRWAY INTO TRACHEA, VIA NATURAL OR ARTIFICIAL OPENING ENDOSCOPIC: ICD-10-PCS | Performed by: HOSPITALIST

## 2017-03-01 PROCEDURE — 76937 US GUIDE VASCULAR ACCESS: CPT

## 2017-03-01 PROCEDURE — 86900 BLOOD TYPING SEROLOGIC ABO: CPT

## 2017-03-01 PROCEDURE — 82330 ASSAY OF CALCIUM: CPT | Mod: 91

## 2017-03-01 PROCEDURE — 700101 HCHG RX REV CODE 250: Performed by: INTERNAL MEDICINE

## 2017-03-01 PROCEDURE — C9113 INJ PANTOPRAZOLE SODIUM, VIA: HCPCS | Performed by: INTERNAL MEDICINE

## 2017-03-01 PROCEDURE — 99292 CRITICAL CARE ADDL 30 MIN: CPT | Mod: 25 | Performed by: INTERNAL MEDICINE

## 2017-03-01 PROCEDURE — C1887 CATHETER, GUIDING: HCPCS

## 2017-03-01 PROCEDURE — 85347 COAGULATION TIME ACTIVATED: CPT

## 2017-03-01 PROCEDURE — 85007 BL SMEAR W/DIFF WBC COUNT: CPT

## 2017-03-01 PROCEDURE — 304538 HCHG NG TUBE

## 2017-03-01 PROCEDURE — 160208 HCHG ENDO MINUTES - EA ADDL 1 MIN LEVEL 4: Performed by: INTERNAL MEDICINE

## 2017-03-01 PROCEDURE — C1751 CATH, INF, PER/CENT/MIDLINE: HCPCS

## 2017-03-01 PROCEDURE — 84484 ASSAY OF TROPONIN QUANT: CPT

## 2017-03-01 PROCEDURE — E0191 PROTECTOR HEEL OR ELBOW: HCPCS | Performed by: SURGERY

## 2017-03-01 PROCEDURE — P9017 PLASMA 1 DONOR FRZ W/IN 8 HR: HCPCS | Mod: 91

## 2017-03-01 PROCEDURE — 86923 COMPATIBILITY TEST ELECTRIC: CPT | Mod: 91

## 2017-03-01 PROCEDURE — 85025 COMPLETE CBC W/AUTO DIFF WBC: CPT

## 2017-03-01 PROCEDURE — 99291 CRITICAL CARE FIRST HOUR: CPT

## 2017-03-01 PROCEDURE — 700105 HCHG RX REV CODE 258: Performed by: INTERNAL MEDICINE

## 2017-03-01 PROCEDURE — C1751 CATH, INF, PER/CENT/MIDLINE: HCPCS | Performed by: EMERGENCY MEDICINE

## 2017-03-01 PROCEDURE — P9034 PLATELETS, PHERESIS: HCPCS | Mod: 91

## 2017-03-01 PROCEDURE — 700117 HCHG RX CONTRAST REV CODE 255: Performed by: RADIOLOGY

## 2017-03-01 PROCEDURE — 302214 INTUBATION BOX: Performed by: INTERNAL MEDICINE

## 2017-03-01 PROCEDURE — 96366 THER/PROPH/DIAG IV INF ADDON: CPT

## 2017-03-01 PROCEDURE — 83605 ASSAY OF LACTIC ACID: CPT | Mod: 91

## 2017-03-01 PROCEDURE — 93010 ELECTROCARDIOGRAM REPORT: CPT | Performed by: INTERNAL MEDICINE

## 2017-03-01 PROCEDURE — 36620 INSERTION CATHETER ARTERY: CPT

## 2017-03-01 PROCEDURE — 96365 THER/PROPH/DIAG IV INF INIT: CPT

## 2017-03-01 PROCEDURE — 36620 INSERTION CATHETER ARTERY: CPT | Performed by: INTERNAL MEDICINE

## 2017-03-01 PROCEDURE — 99291 CRITICAL CARE FIRST HOUR: CPT | Mod: 25 | Performed by: INTERNAL MEDICINE

## 2017-03-01 PROCEDURE — 87040 BLOOD CULTURE FOR BACTERIA: CPT | Mod: 91

## 2017-03-01 PROCEDURE — C9113 INJ PANTOPRAZOLE SODIUM, VIA: HCPCS | Performed by: EMERGENCY MEDICINE

## 2017-03-01 PROCEDURE — 700102 HCHG RX REV CODE 250 W/ 637 OVERRIDE(OP): Performed by: INTERNAL MEDICINE

## 2017-03-01 PROCEDURE — 85730 THROMBOPLASTIN TIME PARTIAL: CPT

## 2017-03-01 PROCEDURE — 700111 HCHG RX REV CODE 636 W/ 250 OVERRIDE (IP)

## 2017-03-01 PROCEDURE — 36600 WITHDRAWAL OF ARTERIAL BLOOD: CPT

## 2017-03-01 PROCEDURE — 04L33DZ OCCLUSION OF HEPATIC ARTERY WITH INTRALUMINAL DEVICE, PERCUTANEOUS APPROACH: ICD-10-PCS | Performed by: RADIOLOGY

## 2017-03-01 PROCEDURE — 31500 INSERT EMERGENCY AIRWAY: CPT | Performed by: HOSPITALIST

## 2017-03-01 PROCEDURE — 36247 INS CATH ABD/L-EXT ART 3RD: CPT

## 2017-03-01 PROCEDURE — 80053 COMPREHEN METABOLIC PANEL: CPT

## 2017-03-01 PROCEDURE — 37799 UNLISTED PX VASCULAR SURGERY: CPT

## 2017-03-01 PROCEDURE — 94760 N-INVAS EAR/PLS OXIMETRY 1: CPT

## 2017-03-01 PROCEDURE — 83735 ASSAY OF MAGNESIUM: CPT

## 2017-03-01 PROCEDURE — 82803 BLOOD GASES ANY COMBINATION: CPT | Mod: 91

## 2017-03-01 PROCEDURE — 302255 BARRIER CREAM MOISTURE BAZA PROTECT: Performed by: SURGERY

## 2017-03-01 PROCEDURE — 0DJ08ZZ INSPECTION OF UPPER INTESTINAL TRACT, VIA NATURAL OR ARTIFICIAL OPENING ENDOSCOPIC: ICD-10-PCS | Performed by: INTERNAL MEDICINE

## 2017-03-01 PROCEDURE — 5A1945Z RESPIRATORY VENTILATION, 24-96 CONSECUTIVE HOURS: ICD-10-PCS | Performed by: INTERNAL MEDICINE

## 2017-03-01 PROCEDURE — 51702 INSERT TEMP BLADDER CATH: CPT

## 2017-03-01 PROCEDURE — 75726 ARTERY X-RAYS ABDOMEN: CPT | Mod: XU

## 2017-03-01 PROCEDURE — 36430 TRANSFUSION BLD/BLD COMPNT: CPT

## 2017-03-01 PROCEDURE — 84295 ASSAY OF SERUM SODIUM: CPT

## 2017-03-01 PROCEDURE — 770022 HCHG ROOM/CARE - ICU (200)

## 2017-03-01 PROCEDURE — 36556 INSERT NON-TUNNEL CV CATH: CPT

## 2017-03-01 PROCEDURE — 85610 PROTHROMBIN TIME: CPT

## 2017-03-01 PROCEDURE — 80048 BASIC METABOLIC PNL TOTAL CA: CPT

## 2017-03-01 PROCEDURE — 500066 HCHG BITE BLOCK, ECT: Performed by: INTERNAL MEDICINE

## 2017-03-01 PROCEDURE — 700111 HCHG RX REV CODE 636 W/ 250 OVERRIDE (IP): Performed by: HOSPITALIST

## 2017-03-01 PROCEDURE — 86901 BLOOD TYPING SEROLOGIC RH(D): CPT

## 2017-03-01 PROCEDURE — 94002 VENT MGMT INPAT INIT DAY: CPT

## 2017-03-01 PROCEDURE — 85014 HEMATOCRIT: CPT | Mod: 91

## 2017-03-01 PROCEDURE — 160203 HCHG ENDO MINUTES - 1ST 30 MINS LEVEL 4: Performed by: INTERNAL MEDICINE

## 2017-03-01 PROCEDURE — 3E0G8GC INTRODUCTION OF OTHER THERAPEUTIC SUBSTANCE INTO UPPER GI, VIA NATURAL OR ARTIFICIAL OPENING ENDOSCOPIC: ICD-10-PCS | Performed by: INTERNAL MEDICINE

## 2017-03-01 PROCEDURE — 82962 GLUCOSE BLOOD TEST: CPT

## 2017-03-01 PROCEDURE — 700105 HCHG RX REV CODE 258: Performed by: HOSPITALIST

## 2017-03-01 PROCEDURE — 36246 INS CATH ABD/L-EXT ART 2ND: CPT

## 2017-03-01 PROCEDURE — B548ZZA ULTRASONOGRAPHY OF SUPERIOR VENA CAVA, GUIDANCE: ICD-10-PCS | Performed by: EMERGENCY MEDICINE

## 2017-03-01 PROCEDURE — 700111 HCHG RX REV CODE 636 W/ 250 OVERRIDE (IP): Performed by: EMERGENCY MEDICINE

## 2017-03-01 PROCEDURE — 84100 ASSAY OF PHOSPHORUS: CPT

## 2017-03-01 PROCEDURE — 81001 URINALYSIS AUTO W/SCOPE: CPT

## 2017-03-01 PROCEDURE — 83880 ASSAY OF NATRIURETIC PEPTIDE: CPT

## 2017-03-01 PROCEDURE — 71010 DX-CHEST-PORTABLE (1 VIEW): CPT

## 2017-03-01 PROCEDURE — 303105 HCHG CATHETER EXTRA

## 2017-03-01 PROCEDURE — 30233K1 TRANSFUSION OF NONAUTOLOGOUS FROZEN PLASMA INTO PERIPHERAL VEIN, PERCUTANEOUS APPROACH: ICD-10-PCS | Performed by: HOSPITALIST

## 2017-03-01 PROCEDURE — 85027 COMPLETE CBC AUTOMATED: CPT

## 2017-03-01 PROCEDURE — 96368 THER/DIAG CONCURRENT INF: CPT

## 2017-03-01 PROCEDURE — A9270 NON-COVERED ITEM OR SERVICE: HCPCS | Performed by: INTERNAL MEDICINE

## 2017-03-01 PROCEDURE — 85576 BLOOD PLATELET AGGREGATION: CPT | Mod: 91

## 2017-03-01 PROCEDURE — 93005 ELECTROCARDIOGRAM TRACING: CPT | Performed by: INTERNAL MEDICINE

## 2017-03-01 PROCEDURE — 84132 ASSAY OF SERUM POTASSIUM: CPT | Mod: 91

## 2017-03-01 PROCEDURE — 86850 RBC ANTIBODY SCREEN: CPT

## 2017-03-01 PROCEDURE — 84443 ASSAY THYROID STIM HORMONE: CPT

## 2017-03-01 PROCEDURE — 36556 INSERT NON-TUNNEL CV CATH: CPT | Performed by: INTERNAL MEDICINE

## 2017-03-01 PROCEDURE — 30233N1 TRANSFUSION OF NONAUTOLOGOUS RED BLOOD CELLS INTO PERIPHERAL VEIN, PERCUTANEOUS APPROACH: ICD-10-PCS | Performed by: INTERNAL MEDICINE

## 2017-03-01 PROCEDURE — 96375 TX/PRO/DX INJ NEW DRUG ADDON: CPT

## 2017-03-01 RX ORDER — MAGNESIUM SULFATE HEPTAHYDRATE 40 MG/ML
4 INJECTION, SOLUTION INTRAVENOUS ONCE
Status: COMPLETED | OUTPATIENT
Start: 2017-03-01 | End: 2017-03-02

## 2017-03-01 RX ORDER — CALCIUM CHLORIDE 100 MG/ML
1 INJECTION INTRAVENOUS; INTRAVENTRICULAR ONCE
Status: COMPLETED | OUTPATIENT
Start: 2017-03-01 | End: 2017-03-01

## 2017-03-01 RX ORDER — ACETAMINOPHEN 325 MG/1
650 TABLET ORAL EVERY 6 HOURS PRN
Status: DISCONTINUED | OUTPATIENT
Start: 2017-03-01 | End: 2017-03-07 | Stop reason: HOSPADM

## 2017-03-01 RX ORDER — LACTULOSE 20 G/30ML
30 SOLUTION ORAL
Status: DISCONTINUED | OUTPATIENT
Start: 2017-03-01 | End: 2017-03-06

## 2017-03-01 RX ORDER — ONDANSETRON 2 MG/ML
INJECTION INTRAMUSCULAR; INTRAVENOUS
Status: COMPLETED
Start: 2017-03-01 | End: 2017-03-01

## 2017-03-01 RX ORDER — CHLORHEXIDINE GLUCONATE ORAL RINSE 1.2 MG/ML
15 SOLUTION DENTAL 2 TIMES DAILY
Status: DISCONTINUED | OUTPATIENT
Start: 2017-03-01 | End: 2017-03-04

## 2017-03-01 RX ORDER — PANTOPRAZOLE SODIUM 40 MG/10ML
80 INJECTION, POWDER, LYOPHILIZED, FOR SOLUTION INTRAVENOUS ONCE
Status: COMPLETED | OUTPATIENT
Start: 2017-03-01 | End: 2017-03-01

## 2017-03-01 RX ORDER — MOEXIPRIL HYDROCHLORIDE 15 MG/1
15 TABLET, FILM COATED ORAL NIGHTLY
Status: ON HOLD | COMMUNITY
End: 2018-09-25 | Stop reason: CLARIF

## 2017-03-01 RX ORDER — AMOXICILLIN 250 MG
2 CAPSULE ORAL 2 TIMES DAILY
Status: DISCONTINUED | OUTPATIENT
Start: 2017-03-01 | End: 2017-03-07 | Stop reason: HOSPADM

## 2017-03-01 RX ORDER — POTASSIUM CHLORIDE 7.45 MG/ML
10 INJECTION INTRAVENOUS ONCE
Status: COMPLETED | OUTPATIENT
Start: 2017-03-01 | End: 2017-03-02

## 2017-03-01 RX ORDER — ONDANSETRON 2 MG/ML
4 INJECTION INTRAMUSCULAR; INTRAVENOUS EVERY 4 HOURS PRN
Status: DISCONTINUED | OUTPATIENT
Start: 2017-03-01 | End: 2017-03-07 | Stop reason: HOSPADM

## 2017-03-01 RX ORDER — BISACODYL 10 MG
10 SUPPOSITORY, RECTAL RECTAL
Status: DISCONTINUED | OUTPATIENT
Start: 2017-03-01 | End: 2017-03-01

## 2017-03-01 RX ORDER — ROCURONIUM BROMIDE 10 MG/ML
50 INJECTION, SOLUTION INTRAVENOUS ONCE
Status: COMPLETED | OUTPATIENT
Start: 2017-03-01 | End: 2017-03-01

## 2017-03-01 RX ORDER — BISACODYL 10 MG
10 SUPPOSITORY, RECTAL RECTAL
Status: DISCONTINUED | OUTPATIENT
Start: 2017-03-01 | End: 2017-03-07 | Stop reason: HOSPADM

## 2017-03-01 RX ORDER — LIDOCAINE HYDROCHLORIDE 10 MG/ML
1-2 INJECTION, SOLUTION INFILTRATION; PERINEURAL
Status: DISCONTINUED | OUTPATIENT
Start: 2017-03-01 | End: 2017-03-06

## 2017-03-01 RX ORDER — POTASSIUM CHLORIDE 14.9 MG/ML
20 INJECTION INTRAVENOUS ONCE
Status: COMPLETED | OUTPATIENT
Start: 2017-03-01 | End: 2017-03-02

## 2017-03-01 RX ORDER — ENEMA 19; 7 G/133ML; G/133ML
1 ENEMA RECTAL
Status: DISCONTINUED | OUTPATIENT
Start: 2017-03-01 | End: 2017-03-01

## 2017-03-01 RX ORDER — POLYETHYLENE GLYCOL 3350 17 G/17G
1 POWDER, FOR SOLUTION ORAL
Status: DISCONTINUED | OUTPATIENT
Start: 2017-03-01 | End: 2017-03-07 | Stop reason: HOSPADM

## 2017-03-01 RX ORDER — CALCIUM CHLORIDE 100 MG/ML
1 INJECTION INTRAVENOUS; INTRAVENTRICULAR ONCE
Status: DISCONTINUED | OUTPATIENT
Start: 2017-03-01 | End: 2017-03-01

## 2017-03-01 RX ORDER — ONDANSETRON 4 MG/1
4 TABLET, ORALLY DISINTEGRATING ORAL EVERY 4 HOURS PRN
Status: DISCONTINUED | OUTPATIENT
Start: 2017-03-01 | End: 2017-03-07 | Stop reason: HOSPADM

## 2017-03-01 RX ORDER — CALCIUM CHLORIDE 100 MG/ML
INJECTION INTRAVENOUS; INTRAVENTRICULAR
Status: COMPLETED
Start: 2017-03-01 | End: 2017-03-01

## 2017-03-01 RX ORDER — SODIUM CHLORIDE 9 MG/ML
2000 INJECTION, SOLUTION INTRAVENOUS ONCE
Status: COMPLETED | OUTPATIENT
Start: 2017-03-01 | End: 2017-03-01

## 2017-03-01 RX ORDER — OCTREOTIDE ACETATE 50 UG/ML
50 INJECTION, SOLUTION INTRAVENOUS; SUBCUTANEOUS ONCE
Status: COMPLETED | OUTPATIENT
Start: 2017-03-01 | End: 2017-03-01

## 2017-03-01 RX ORDER — SODIUM CHLORIDE 9 MG/ML
INJECTION, SOLUTION INTRAVENOUS CONTINUOUS
Status: DISCONTINUED | OUTPATIENT
Start: 2017-03-01 | End: 2017-03-02

## 2017-03-01 RX ORDER — ETOMIDATE 2 MG/ML
20 INJECTION INTRAVENOUS ONCE
Status: COMPLETED | OUTPATIENT
Start: 2017-03-01 | End: 2017-03-01

## 2017-03-01 RX ORDER — LEVOTHYROXINE SODIUM 112 UG/1
112 TABLET ORAL
Status: ON HOLD | COMMUNITY
End: 2018-09-25 | Stop reason: CLARIF

## 2017-03-01 RX ORDER — EPINEPHRINE 0.1 MG/ML
SYRINGE (ML) INJECTION
Status: DISCONTINUED | OUTPATIENT
Start: 2017-03-01 | End: 2017-03-04

## 2017-03-01 RX ORDER — OCTREOTIDE ACETATE 50 UG/ML
50 INJECTION, SOLUTION INTRAVENOUS; SUBCUTANEOUS ONCE
Status: DISCONTINUED | OUTPATIENT
Start: 2017-03-01 | End: 2017-03-01

## 2017-03-01 RX ORDER — ONDANSETRON 2 MG/ML
4 INJECTION INTRAMUSCULAR; INTRAVENOUS ONCE
Status: COMPLETED | OUTPATIENT
Start: 2017-03-01 | End: 2017-03-01

## 2017-03-01 RX ADMIN — SODIUM CHLORIDE: 9 INJECTION, SOLUTION INTRAVENOUS at 13:45

## 2017-03-01 RX ADMIN — CALCIUM CHLORIDE 1 G: 100 INJECTION PARENTERAL at 17:45

## 2017-03-01 RX ADMIN — SODIUM CHLORIDE: 9 INJECTION, SOLUTION INTRAVENOUS at 20:03

## 2017-03-01 RX ADMIN — SODIUM BICARBONATE 100 MEQ: 84 INJECTION, SOLUTION INTRAVENOUS at 13:39

## 2017-03-01 RX ADMIN — CEFTRIAXONE 2 G: 2 INJECTION, POWDER, FOR SOLUTION INTRAMUSCULAR; INTRAVENOUS at 10:26

## 2017-03-01 RX ADMIN — ONDANSETRON 4 MG: 2 INJECTION, SOLUTION INTRAMUSCULAR; INTRAVENOUS at 08:39

## 2017-03-01 RX ADMIN — SODIUM CHLORIDE 2000 ML: 9 INJECTION, SOLUTION INTRAVENOUS at 06:33

## 2017-03-01 RX ADMIN — ONDANSETRON 4 MG: 2 INJECTION INTRAMUSCULAR; INTRAVENOUS at 08:39

## 2017-03-01 RX ADMIN — PROPOFOL 55 MCG/KG/MIN: 10 INJECTION, EMULSION INTRAVENOUS at 21:49

## 2017-03-01 RX ADMIN — ROCURONIUM BROMIDE 50 MG: 10 INJECTION INTRAVENOUS at 16:13

## 2017-03-01 RX ADMIN — CHLORHEXIDINE GLUCONATE 15 ML: 1.2 RINSE ORAL at 21:00

## 2017-03-01 RX ADMIN — OCTREOTIDE ACETATE 50 MCG/HR: 200 INJECTION, SOLUTION INTRAVENOUS; SUBCUTANEOUS at 08:52

## 2017-03-01 RX ADMIN — SODIUM CHLORIDE 2000 ML: 9 INJECTION, SOLUTION INTRAVENOUS at 06:45

## 2017-03-01 RX ADMIN — SODIUM CHLORIDE 8 MG/HR: 9 INJECTION, SOLUTION INTRAVENOUS at 20:40

## 2017-03-01 RX ADMIN — CALCIUM CHLORIDE 1 G: 100 INJECTION, SOLUTION INTRAVENOUS at 22:10

## 2017-03-01 RX ADMIN — SODIUM CHLORIDE: 9 INJECTION, SOLUTION INTRAVENOUS at 10:27

## 2017-03-01 RX ADMIN — MAGNESIUM SULFATE IN WATER 4 G: 40 INJECTION, SOLUTION INTRAVENOUS at 22:21

## 2017-03-01 RX ADMIN — OCTREOTIDE ACETATE 50 MCG/HR: 200 INJECTION, SOLUTION INTRAVENOUS; SUBCUTANEOUS at 09:56

## 2017-03-01 RX ADMIN — FENTANYL CITRATE 100 MCG: 50 INJECTION, SOLUTION INTRAMUSCULAR; INTRAVENOUS at 21:41

## 2017-03-01 RX ADMIN — PROPOFOL 45 MCG/KG/MIN: 10 INJECTION, EMULSION INTRAVENOUS at 20:15

## 2017-03-01 RX ADMIN — SODIUM BICARBONATE 50 MEQ: 84 INJECTION, SOLUTION INTRAVENOUS at 18:45

## 2017-03-01 RX ADMIN — ETOMIDATE 20 MG: 2 INJECTION, SOLUTION INTRAVENOUS at 16:13

## 2017-03-01 RX ADMIN — POTASSIUM CHLORIDE 10 MEQ: 7.46 INJECTION, SOLUTION INTRAVENOUS at 23:14

## 2017-03-01 RX ADMIN — FOLIC ACID 1 MG: 5 INJECTION, SOLUTION INTRAMUSCULAR; INTRAVENOUS; SUBCUTANEOUS at 10:53

## 2017-03-01 RX ADMIN — SODIUM BICARBONATE 100 MEQ: 84 INJECTION, SOLUTION INTRAVENOUS at 18:10

## 2017-03-01 RX ADMIN — IOHEXOL 85 ML: 300 INJECTION, SOLUTION INTRAVENOUS at 19:00

## 2017-03-01 RX ADMIN — CALCIUM CHLORIDE: 100 INJECTION PARENTERAL at 17:15

## 2017-03-01 RX ADMIN — PHENYLEPHRINE HYDROCHLORIDE 50 MCG/MIN: 10 INJECTION INTRAVENOUS at 11:57

## 2017-03-01 RX ADMIN — INSULIN LISPRO 2 UNITS: 100 INJECTION, SOLUTION INTRAVENOUS; SUBCUTANEOUS at 19:30

## 2017-03-01 RX ADMIN — POTASSIUM CHLORIDE 20 MEQ: 14.9 INJECTION, SOLUTION INTRAVENOUS at 22:11

## 2017-03-01 RX ADMIN — SODIUM CHLORIDE 8 MG/HR: 9 INJECTION, SOLUTION INTRAVENOUS at 09:56

## 2017-03-01 RX ADMIN — NOREPINEPHRINE BITARTRATE 5 MCG/MIN: 1 INJECTION INTRAVENOUS at 09:30

## 2017-03-01 RX ADMIN — SODIUM CHLORIDE 8 MG/HR: 9 INJECTION, SOLUTION INTRAVENOUS at 08:51

## 2017-03-01 RX ADMIN — NOREPINEPHRINE BITARTRATE 30 MCG/MIN: 1 INJECTION INTRAVENOUS at 15:28

## 2017-03-01 RX ADMIN — SODIUM BICARBONATE 50 MEQ: 84 INJECTION, SOLUTION INTRAVENOUS at 19:00

## 2017-03-01 RX ADMIN — Medication 100 MEQ: at 13:39

## 2017-03-01 RX ADMIN — THIAMINE HYDROCHLORIDE 100 MG: 100 INJECTION, SOLUTION INTRAMUSCULAR; INTRAVENOUS at 11:09

## 2017-03-01 RX ADMIN — PANTOPRAZOLE SODIUM 80 MG: 40 INJECTION, POWDER, FOR SOLUTION INTRAVENOUS at 08:47

## 2017-03-01 RX ADMIN — OCTREOTIDE ACETATE 50 MCG: 50 INJECTION, SOLUTION INTRAVENOUS; SUBCUTANEOUS at 08:47

## 2017-03-01 ASSESSMENT — COPD QUESTIONNAIRES
COPD SCREENING SCORE: 4
HAVE YOU SMOKED AT LEAST 100 CIGARETTES IN YOUR ENTIRE LIFE: YES
DURING THE PAST 4 WEEKS HOW MUCH DID YOU FEEL SHORT OF BREATH: NONE/LITTLE OF THE TIME
DO YOU EVER COUGH UP ANY MUCUS OR PHLEGM?: NO/ONLY WITH OCCASIONAL COLDS OR INFECTIONS

## 2017-03-01 ASSESSMENT — PAIN SCALES - GENERAL
PAINLEVEL_OUTOF10: 0

## 2017-03-01 ASSESSMENT — LIFESTYLE VARIABLES: EVER_SMOKED: YES

## 2017-03-01 NOTE — ED NOTES
Pt resting in stretcher. No needs at this time. Call light within reach. Pt updated on poc. Bear hugger remains in place.

## 2017-03-01 NOTE — ED NOTES
MD Fitch notified that pt bp is trending down. MD will order two more units of PRBC and additional vasopressor.

## 2017-03-01 NOTE — ED NOTES
Ricardo from Lab called with critical result of HGB at 4.3 and HCT at 13.8. Critical lab result read back to Ricardo.   Dr. Pineda notified of critical lab result at 0717.  Critical lab result read back by Dr. Pineda.

## 2017-03-01 NOTE — DISCHARGE PLANNING
Medical Social Work    Referral: Critical Pt    Intervention: MSW responded to RD09.  Pt is a 66 year old female brought in by ISREAL from home.  Pt is Shannon Balbuena (: 1950).  Per ER Lobby pt's family is in the front lobby.  MSW met with pt's boyfriend, Triston Bowling ([phone]); listed in [facility] as pt's emergency contact.  Pt's boyfriend was escorted to Family Support Room and bedside RN was updated.  Pt's boyfriend states that pt has been complaining of a stomach ache for the past couple of days and hasn't been eating much but has been otherwise healthy.    Plan: SW will remain available as needed.

## 2017-03-01 NOTE — ED NOTES
Med rec complete per friend outside room and [facility] [phone]  Allergies reviewed per friend

## 2017-03-01 NOTE — IP AVS SNAPSHOT
[employer] Access Code: [email or web address]  Expires: 4/6/2017  2:19 PM    [employer]  A secure, online tool to manage your health information     [employer]’s [employer]® is a secure, online tool that connects you to your personalized health information from the privacy of your home -- day or night - making it very easy for you to manage your healthcare. Once the activation process is completed, you can even access your medical information using the [employer] yvette, which is available for free in the Apple Yvette store or Google Play store.     [employer] provides the following levels of access (as shown below):   My Chart Features   [facility] Primary Care Doctor [facility]  Specialists [facility]  Urgent  Care Non-[facility]  Primary Care  Doctor   Email your healthcare team securely and privately 24/7 X X X X   Manage appointments: schedule your next appointment; view details of past/upcoming appointments X      Request prescription refills. X      View recent personal medical records, including lab and immunizations X X X X   View health record, including health history, allergies, medications X X X X   Read reports about your outpatient visits, procedures, consult and ER notes X X X X   See your discharge summary, which is a recap of your hospital and/or ER visit that includes your diagnosis, lab results, and care plan. X X       How to register for [employer]:  1. Go to  [email or web address].  2. Click on the Sign Up Now box, which takes you to the New Member Sign Up page. You will need to provide the following information:  a. Enter your [employer] Access Code exactly as it appears at the top of this page. (You will not need to use this code after you’ve completed the sign-up process. If you do not sign up before the expiration date, you must request a new code.)   b. Enter your date of birth.   c. Enter your home email address.   d. Click Submit, and follow the next screen’s instructions.  3. Create a [employer] ID. This will be your [employer]  login ID and cannot be changed, so think of one that is secure and easy to remember.  4. Create a [employer] password. You can change your password at any time.  5. Enter your Password Reset Question and Answer. This can be used at a later time if you forget your password.   6. Enter your e-mail address. This allows you to receive e-mail notifications when new information is available in [employer].  7. Click Sign Up. You can now view your health information.    For assistance activating your [employer] account, call [phone]

## 2017-03-01 NOTE — ED NOTES
Pt placed on bear hugger to warm. Blood products infusing, second RN at bedside for verification. Unable to obtain BP at this time, ERP notified.

## 2017-03-01 NOTE — IP AVS SNAPSHOT
" Home Care Instructions                                                                                                                  Name:Shannon Balbuena  Medical Record Number:[id number]  CSN: [id number]    YOB: 1950   Age: 66 y.o.  Sex: female  HT:1.727 m (5' 7.99\") WT: 73.5 kg (162 lb 0.6 oz)          Admit Date: 3/1/2017     Discharge Date:   Today's Date: 3/7/2017  Attending Doctor:  Nilda Rawls D.O.                  Allergies:  Codeine            Discharge Instructions         Discharge Instructions    DO NOT take NSAIDs, and alcohol.    Discharged to home by car with relative. Discharged via wheelchair, hospital escort: Yes.  Special equipment needed: Not Applicable    Be sure to schedule a follow-up appointment with your primary care doctor or any specialists as instructed.     Discharge Plan:   Influenza Vaccine Indication: Not indicated: Previously immunized this influenza season and > 8 years of age    I understand that a diet low in cholesterol, fat, and sodium is recommended for good health. Unless I have been given specific instructions below for another diet, I accept this instruction as my diet prescription.   Other diet: GI soft diet    Special Instructions: None    · Is patient discharged on Warfarin / Coumadin?   No     · Is patient Post Blood Transfusion?  No      Depression / Suicide Risk    As you are discharged from this [facility] facility, it is important to learn how to keep safe from harming yourself.    Recognize the warning signs:  · Abrupt changes in personality, positive or negative- including increase in energy   · Giving away possessions  · Change in eating patterns- significant weight changes-  positive or negative  · Change in sleeping patterns- unable to sleep or sleeping all the time   · Unwillingness or inability to communicate  · Depression  · Unusual sadness, discouragement and loneliness  · Talk of wanting to die  · Neglect of personal " appearance   · Rebelliousness- reckless behavior  · Withdrawal from people/activities they love  · Confusion- inability to concentrate     If you or a loved one observes any of these behaviors or has concerns about self-harm, here's what you can do:  · Talk about it- your feelings and reasons for harming yourself  · Remove any means that you might use to hurt yourself (examples: pills, rope, extension cords, firearm)  · Get professional help from the community (Mental Health, Substance Abuse, psychological counseling)  · Do not be alone:Call your Safe Contact- someone whom you trust who will be there for you.  · Call your local CRISIS HOTLINE 253-3979 or 231-449-6834  · Call your local Children's Mobile Crisis Response Team [place] (398) 435-7030 or www.[place]  · Call the toll free National Suicide Prevention Hotlines   · National Suicide Prevention Lifeline 224-338-BOUX (4458)  · [employer] Line Network 800-SUICIDE (903-9956)        Gastrointestinal Bleeding  Gastrointestinal (GI) bleeding means there is bleeding somewhere along the digestive tract, between the mouth and anus.  CAUSES   There are many different problems that can cause GI bleeding. Possible causes include:  · Esophagitis. This is inflammation, irritation, or swelling of the esophagus.  · Hemorrhoids. These are veins that are full of blood (engorged) in the rectum. They cause pain, inflammation, and may bleed.  · Anal fissures. These are areas of painful tearing which may bleed. They are often caused by passing hard stool.  · Diverticulosis. These are pouches that form on the colon over time, with age, and may bleed significantly.  · Diverticulitis. This is inflammation in areas with diverticulosis. It can cause pain, fever, and bloody stools, although bleeding is rare.  · Polyps and cancer. Colon cancer often starts out as precancerous polyps.  · Gastritis and ulcers. Bleeding from the upper gastrointestinal tract (near the  stomach) may travel through the intestines and produce black, sometimes tarry, often bad smelling stools. In certain cases, if the bleeding is fast enough, the stools may not be black, but red. This condition may be life-threatening.  SYMPTOMS   2. Vomiting bright red blood or material that looks like coffee grounds.  3. Bloody, black, or tarry stools.  DIAGNOSIS   Your caregiver may diagnose your condition by taking your history and performing a physical exam. More tests may be needed, includin. X-rays and other imaging tests.  3. Esophagogastroduodenoscopy (EGD). This test uses a flexible, lighted tube to look at your esophagus, stomach, and small intestine.  4. Colonoscopy. This test uses a flexible, lighted tube to look at your colon.  TREATMENT   Treatment depends on the cause of your bleeding.   2. For bleeding from the esophagus, stomach, small intestine, or colon, the caregiver doing your EGD or colonoscopy may be able to stop the bleeding as part of the procedure.  3. Inflammation or infection of the colon can be treated with medicines.  4. Many rectal problems can be treated with creams, suppositories, or warm baths.  5. Surgery is sometimes needed.  6. Blood transfusions are sometimes needed if you have lost a lot of blood.  If bleeding is slow, you may be allowed to go home. If there is a lot of bleeding, you will need to stay in the hospital for observation.  HOME CARE INSTRUCTIONS   2. Take any medicines exactly as prescribed.  3. Keep your stools soft by eating foods that are high in fiber. These foods include whole grains, legumes, fruits, and vegetables. Prunes (1 to 3 a day) work well for many people.  4. Drink enough fluids to keep your urine clear or pale yellow.  SEEK IMMEDIATE MEDICAL CARE IF:   · Your bleeding increases.  · You feel lightheaded, weak, or you faint.  · You have severe cramps in your back or abdomen.  · You pass large blood clots in your stool.  · Your problems are getting  worse.  MAKE SURE YOU:   · Understand these instructions.  · Will watch your condition.  · Will get help right away if you are not doing well or get worse.     This information is not intended to replace advice given to you by your health care provider. Make sure you discuss any questions you have with your health care provider.     Document Released: 12/15/2001 Document Revised: 12/04/2013 Document Reviewed: 11/26/2012  [employer] Interactive Patient Education ©2016 [employer].      Follow-up Information     1. Follow up with Edvin BREEN M.D.. Schedule an appointment as soon as possible for a visit in 1 week.    Specialty:  Family Medicine    Contact information    [address]  [place] [id number] [phone]          2. Follow up with Bishop Maharaj M.D.. Schedule an appointment as soon as possible for a visit in 4 weeks.    Specialty:  Gastroenterology    Contact information    [address]  [place] [address]  [phone]           Discharge Medication Instructions:    Below are the medications your physician expects you to take upon discharge:    Review all your home medications and newly ordered medications with your doctor and/or pharmacist. Follow medication instructions as directed by your doctor and/or pharmacist.    Please keep your medication list with you and share with your physician.               Medication List      START taking these medications        Instructions    amlodipine 10 MG Tabs   Last time this was given:  10 mg on 3/7/2017  8:12 AM   Commonly known as:  NORVASC    Take 1 Tab by mouth every day.   Dose:  10 mg       folic acid 1 MG Tabs   Last time this was given:  1 mg on 3/7/2017  8:13 AM   Commonly known as:  FOLVITE    Take 1 Tab by mouth every day.   Dose:  1 mg       nystatin Powd   Last time this was given:  1,500,000 Units on 3/6/2017  7:40 PM   Commonly known as:  MYCOSTATIN    Apply 1 Application to affected area(s) 3 times a day.   Dose:  1 Application        omeprazole 20 MG delayed-release capsule   Last time this was given:  20 mg on 3/7/2017  2:04 PM   Commonly known as:  PRILOSEC    Take 1 Cap by mouth 2 Times a Day.   Dose:  20 mg       potassium chloride SA 20 MEQ Tbcr   Last time this was given:  20 mEq on 3/7/2017  2:03 PM   Commonly known as:  Kdur    Take 1 Tab by mouth 2 Times a Day.   Dose:  20 mEq         CONTINUE taking these medications        Instructions    levothyroxine 112 MCG Tabs   Last time this was given:  112 mcg on 3/7/2017  5:07 AM   Commonly known as:  SYNTHROID    Take 112 mcg by mouth Every morning on an empty stomach.   Dose:  112 mcg       moexipril 15 MG Tabs   Commonly known as:  UNIVASC    Take 15 mg by mouth every evening.   Dose:  15 mg               Instructions           Diet / Nutrition:    Follow any diet instructions given to you by your doctor or the dietician, including how much salt (sodium) you are allowed each day.    If you are overweight, talk to your doctor about a weight reduction plan.    Activity:    Remain physically active following your doctor's instructions about exercise and activity.    Rest often.     Any time you become even a little tired or short of breath, SIT DOWN and rest.    Worsening Symptoms:    Report any of the following signs and symptoms to the doctor's office immediately:    *Pain of jaw, arm, or neck  *Chest pain not relieved by medication                               *Dizziness or loss of consciousness  *Difficulty breathing even when at rest   *More tired than usual                                       *Bleeding drainage or swelling of surgical site  *Swelling of feet, ankles, legs or stomach                 *Fever (>100ºF)  *Pink or blood tinged sputum  *Weight gain (3lbs/day or 5lbs /week)           *Shock from internal defibrillator (if applicable)  *Palpitations or irregular heartbeats                *Cool and/or numb extremities    Stroke Awareness    Common Risk Factors for Stroke  include:    Age  Atrial Fibrillation  Carotid Artery Stenosis  Diabetes Mellitus  Excessive alcohol consumption  High blood pressure  Overweight   Physical inactivity  Smoking    Warning signs and symptoms of a stroke include:    *Sudden numbness or weakness of the face, arm or leg (especially on one side of the body).  *Sudden confusion, trouble speaking or understanding.  *Sudden trouble seeing in one or both eyes.  *Sudden trouble walking, dizziness, loss of balance or coordination.Sudden severe headache with no known cause.    It is very important to get treatment quickly when a stroke occurs. If you experience any of the above warning signs, call 911 immediately.                   Disclaimer         Quit Smoking / Tobacco Use:    I understand the use of any tobacco products increases my chance of suffering from future heart disease or stroke and could cause other illnesses which may shorten my life. Quitting the use of tobacco products is the single most important thing I can do to improve my health. For further information on smoking / tobacco cessation call a Toll Free Quit Line at 1-102.390.1161 (*National Cancer [place]) or 1-426.946.6328 (American Lung Association) or you can access the web based program at www.lungusa.org.    [place] Tobacco Users Help Line:  (803) 653-1709       Toll Free: 1-828.342.6644  Quit Tobacco Program [facility] (859)438-2884    Crisis Hotline:    [place] Crisis Hotline:  1-449-FBGFRHL or 1-910.508.1573    [place] Crisis Hotline:    1-536.160.3009 or 340-637-2951    Discharge Survey:   Thank you for choosing [facility]. We hope we did everything we could to make your hospital stay a pleasant one. You may be receiving a phone survey and we would appreciate your time and participation in answering the questions. Your input is very valuable to us in our efforts to improve our service to our patients and their families.        My signature on this form  indicates that:    1. I have reviewed and understand the above information.  2. My questions regarding this information have been answered to my satisfaction.  3. I have formulated a plan with my discharge nurse to obtain my prescribed medications for home.                  Disclaimer         __________________________________                     __________       ________                       Patient Signature                                                 Date                    Time

## 2017-03-01 NOTE — ED NOTES
Tech from Lab called with critical result of lactic 14.6 at 0643. Critical lab result read back to tech.   Dr. Pineda notified of critical lab result at 0644.  Critical lab result read back by Dr. Pineda.

## 2017-03-01 NOTE — ED NOTES
3rd unit of blood infusing. Pt alert, oriented. Bear hugger in place. Pt state she is much warmer now.

## 2017-03-01 NOTE — ED NOTES
MD Pineda notified that pt bp is trending down. Per MD Pineda have central line cart at bedside. Alerted ED pharm to order levophed.

## 2017-03-01 NOTE — ED NOTES
"Chief Complaint   Patient presents with   • Rectal Bleeding     Pt reports dark red blood since Sunday. Pt pale and lethargic on arrival.     Pulse 122  Temp(Src) 35.6 °C (96.1 °F)  Resp 16  Ht 1.727 m (5' 7.99\")  Wt 62.143 kg (137 lb)  BMI 20.84 kg/m2    Pt brought in by ALCIDESSA from home, pt reports approx 2 episodes of blood in stool a day since Sunday. Pt wakes to verbal stimuli. Placed in room RD 9. Complaints and vitals as above. Pt on monitors, all alarms audible. Chart flagged for ERP to see.  "

## 2017-03-01 NOTE — ED PROVIDER NOTES
"ED Provider Note    CHIEF COMPLAINT  Chief Complaint   Patient presents with   • Rectal Bleeding     Pt reports dark red blood since Sunday. Pt pale and lethargic on arrival.       HPI  Shannon Balbuena is a 66 y.o. female who presents to the emergency department with a chief complaint of rectal bleeding. The patient has had some epigastric.pain for couple days. She's had several days of bloody stools. The patient's symptoms started on Sunday. She presents to the emergency department critically ill-appearing pale, lethargic. History and physical examination is limited secondary to the patient's acute medical condition.    REVIEW OF SYSTEMS  Review of systems Limited secondary to the patient's acute medical condition    PAST MEDICAL HISTORY  No past medical history on file.    FAMILY HISTORY  No family history on file.    SOCIAL HISTORY  Social History     Social History   • Marital Status:      Spouse Name: N/A   • Number of Children: N/A   • Years of Education: N/A     Social History Main Topics   • Smoking status: Not on file   • Smokeless tobacco: Not on file   • Alcohol Use: Not on file   • Drug Use: Not on file   • Sexual Activity: Not on file     Other Topics Concern   • Not on file     Social History Narrative   • No narrative on file       SURGICAL HISTORY  No past surgical history on file.    CURRENT MEDICATIONS  Home Medications     Reviewed by Alycia Elizabeth (Pharmacy Tech) on 03/01/17 at 0847  Med List Status: Complete    Medication Last Dose Status    levothyroxine (SYNTHROID) 112 MCG Tab 2/28/2017 Active    moexipril (UNIVASC) 15 MG Tab 2/28/2017 Active                ALLERGIES  Allergies   Allergen Reactions   • Codeine        PHYSICAL EXAM  VITAL SIGNS: BP 71/51 mmHg  Pulse 112  Temp(Src) 36.1 °C (97 °F)  Resp 46  Ht 1.727 m (5' 7.99\")  Wt 62.143 kg (137 lb)  BMI 20.84 kg/m2  SpO2 100%  Constitutional: Well developed, Well nourished, moderate distress, critically " ill-appearing female. Emilie and colored stool noted.  HENT: Normocephalic, Atraumatic, Bilateral external ears normal, Oropharynx moist, No oral exudates, Nose normal.   Eyes: PERRL, EOMI, Conjunctiva normal, No discharge.   Neck: Normal range of motion, No tenderness, Supple, No stridor.   Lymphatic: No lymphadenopathy noted.   Cardiovascular: Normal heart rate, Normal rhythm, No murmurs, No rubs, No gallops.   Thorax & Lungs: Normal breath sounds, No respiratory distress, No wheezing, No chest tenderness.   Abdomen: Mild epigastric tenderness to palpation  Skin: Warm, Dry, No erythema, No rash.   Back: No tenderness, No CVA tenderness.   Extremities: Intact distal pulses, No edema, No tenderness, No cyanosis, No clubbing.   Neurologic: Alert & oriented x 3, Normal motor function, Normal sensory function, No focal deficits noted.       RADIOLOGY/PROCEDURES  Author: Stephanie Bradley M.D. Service: MEDICAL Author Type: Resident      Filed: 3/1/2017  9:45 AM Note Time: 3/1/2017  9:42 AM Note Type: Procedures     Status: Cosign Needed : Stephanie Bradley M.D. (Resident)     Cosign Required: Yes           Pre-procedure Diagnoses     1. Shock (CMS-HCC) [R57.9]              Procedures     1. PROCEDURE CENTRAL LINE [PRO85 (Custom)]            Expand All Collapse All    CENTRAL LINE PLACEMENT    Indication:  Shock, need for pressors    Performed by: Stephanie Bradley MD    Supervising: Edwin ESCOBEDO  Time Out: A time-out was completed verifying consent obtained, correct patient, procedure, positioning, and correct supplies were present.    Consent on Chart: [X] Yes or [] No     PROCEDURE:  The area of the Right IJV was prepped and draped in sterile fashion. All participants observed sterile technique and hats, masks, sterile gowns and gloves were worn.    Subcutaneous 1% lidocaine was injected for local anesthesia.    The patient was placed in Trendelenburg's position.    All three triple lumen ports were flushed with saline  and then locked.  Ultrasound guidance was used and a standard central venous catheter kit needle was used to cannulate the Right IJV vein and a guidewire was place through the needle into the vein. Using the Seldinger technique, a triple lumen central venous catheter was placed over the wire into the vein. Non-pulsatile blood was aspirated from all ports, and all ports were flushed with a total of 10 cc's of sterile solution.    Dr. Pineda was present at bedside to supervise    Actual insertion length: 16 cm    The catheter was sutured into place with [X] 2.0 or [] 3.0 suture and an occlusive sterile dressing with BioPatch was applied.      Attempts: *TWO  Complications: None    Blood Loss: Minimal    Post-Procedure: Vitals were stable and patient tolerated procedure well.    CVC Placement Verification:  CXR: [X] Yes     Stephanie Bradley MD , PGY-2               I was present during the entire procedure to assist with the placement of the right internal jugular central line. Details are dictated in the note by the resident as above.      COURSE & MEDICAL DECISION MAKING  Pertinent Labs & Imaging studies reviewed. (See chart for details)    The patient presents today critically ill after an apparent GI bleed. The patient arrives without a detectable blood pressure. She is tachycardic. She is extremely pale on examination. She discovered bloody stool. An IV is established. The patient is fluid resuscitated with 2 L of normal saline. I immediately ordered 2 units of crossmatched blood as I anticipate the patient will be profoundly anemic.    The patient presents today with an acute GI bleed. She is profoundly hypotensive. She receives 2 units of crossmatched blood. She also was volume resuscitated with normal saline. The patient continued to be hypotensive. Therefore a central line was inserted under my direct supervision. I initiated the patient on levophed.    The patient presents today with an acute GI bleed. She'll  be admitted. I spoke with the on-call gastroenterologist for admission. In addition I spoke with the on-call hospitalist who accepts the patient for admission.    DISPOSITION:  Patient will be admitted to Dr. Fitch in critical condition.      FINAL IMPRESSION  1. Acute GI bleeding    2. Shock (CMS-HCC)    3. Acute renal failure, unspecified acute renal failure type (CMS-HCC)    4. Thrombocytopenia (CMS-HCC)    5. Elevated INR    6. Elevated troponin    7. Lactic acidosis     central line placement under my direct supervision        Electronically signed by: Winston Pineda, 3/1/2017 10:47 AM

## 2017-03-01 NOTE — ED NOTES
1st unit of plasma received from blood bank, when scanned into EPIC states wrong unit number. Sent back to blood bank. ICU RN notified and will call blood bank once pt is on ICU floor.

## 2017-03-01 NOTE — PROGRESS NOTES
Assumed care of pt and transported to T606 with partner Triston. Updated Dr. Kerr; received orders to give bicarb and recheck ABG.

## 2017-03-01 NOTE — CONSULTS
DATE OF SERVICE:  03/01/2017    GASTROENTEROLOGY CONSULTATION    DATE OF CONSULTATION:  03/01/2017    CONSULTATION REQUESTED BY:  Winston Pineda MD    REASON FOR CONSULTATION:  Gastrointestinal hemorrhage.    IDENTIFICATION:  A 66-year-old  female.    CHIEF COMPLAINT:  Rectal bleeding.    HISTORY OF PRESENT ILLNESS:  History was obtained via interview of the patient   who is a limited historian secondary to critical illness.  Her significant   other Bill was also present.  I also discussed the case with Dr. Pineda and   reviewed [facility] records.  The patient has a pertinent history of   hypothyroidism, hypertension.  She uses levothyroxine and moexipril home.    Apparently, she has had frequent daily use of ibuprofen recently for chronic   low back pain.  She also has frequent heartburn, but denies dysphagia, last   evening early morning hours around 4:00 a.m., she awoke and had passage of a   large amount of bright red blood per rectum, she became lightheaded, EMS was   called, per the patient this has never happened before.  She has never had EGD   nor colonoscopy nor seen a gastroenterologist.  No pertinent family history   of luminal GI disease or liver disease.  The patient does drink alcohol,   estimating 2 glasses of whiskey per night.  Her initial labs showed hemoglobin   4.3.  She received 2 units urgently here, her hemoglobin juliet to 6.  She is   receiving a third unit.  Her platelet count was low at 100,000.  Her BUN is   elevated at 98 with a creatinine of 2.67.  INR was elevated at 1.47.  Troponin   I was initially 0.25, the patient denies any chest pain or palpitations or   shortness of breath.  She is weak and lightheaded.  She has been having some   vague abdominal pain, which is a kind of a generalized.  No identified   alleviating or aggravating factors, per se.  She has had loss of appetite and   thinks that eating might make the pain worse.  No radiation of the pain.  She   is  hypotensive.    ALLERGIES:  CODEINE WHICH CAUSES A RASH.    MEDICATIONS:  Moexipril and levothyroxine.    PAST MEDICAL HISTORY:  1.  Hypertension.  2.  Hypothyroidism.  3.  Alcohol abuse.    PAST SURGICAL HISTORY:  Right hip replacement.    SOCIAL HISTORY:  The patient does not smoke.  She drinks alcohol, no drugs.    She lives in [place].  She is retired.    FAMILY HISTORY:  Noncontributory, specifically, no luminal GI disease, liver   disease, or pancreatic disease.    REVIEW OF SYSTEMS:  A 14-point review of systems is negative except as noted   above.  See the HPI.    PHYSICAL EXAMINATION:  GENERAL:  No immediate distress, friendly, cooperative, and appropriate.  VITAL SIGNS:  Afebrile, initially her temperature was 35.1, blood pressure   64/42, heart rate 120-138, respiratory rate 22, and oxygen saturation 90-92%   on room air.  HEENT:  Normocephalic and atraumatic, sclerae are anicteric, conjunctiva pale.  Oropharynx is moist and clear with poor dentition.  NECK:  No thyroid abnormality or lymphadenopathy.  LUNGS:  Clear to auscultation without wheezes, rales or rhonchi.  CARDIOVASCULAR:  Tachycardic without audible wheezes, rales, or rhonchi.  ABDOMEN:  Bowel sounds present.  EXTREMITIES:  No clubbing, cyanosis or edema.  SKIN:  Palmar erythema and a few scattered angio.  NEUROLOGICAL:  Lethargic.  She is oriented and cooperative.    LABORATORY DATA:  White blood cell count 5800, hemoglobin 4.3, hematocrit   13.8, hemoglobin rest 6, , platelet count 100,000.  Hepatic panel is   unremarkable.  Lactate 14.6, CO2 7, BUN 98 with the creatinine 2.6.  Troponin   9.25, .    IMAGING STUDIES:  None.    IMPRESSION:  This is a critically ill 66-year-old  female with a   hemodynamically significant gastrointestinal hemorrhage.  It is unclear at   this point whether this is an upper gastrointestinal source or lower   gastrointestinal source based on history examination and labs, I suspect she   does  have portal hypertension in the setting of chronic liver disease,   esophageal variceal or gastric variceal bleed would be a consideration.    Peptic ulcer disease would also be a consideration given recent use of NSAIDs   and some abdominal discomfort, also consider a lower gastrointestinal source   such as diverticular bleeding or bleeding from angiodysplasia.  Regardless   urgent resuscitation is of paramount importance.  She will need to be admitted   to the ICU and have further measures before any endoscopic evaluation to be   conducted, urgently.    PROBLEMS:  1.  Hematochezia.  2.  Anemia with the component of acute gastrointestinal blood loss.  3.  Hypertension.  4.  Abdominal pain, generalized.  5.  Nausea and vomiting.  6.  Renal insufficiency.  7.  Coagulopathy.  8.  Hypertension.  9.  Hypothyroidism.    PLAN AND RECOMMENDATIONS:  1.  Agree with admission to the ICU urgently critical care and the hospitalist   should be involved.  Gastroenterology will continue to follow closely,   obviously.  2.  Transfuse packed red blood cells as you are.  3.  Serial labs including hemoglobin, hematocrit and INR with transfusion of   packed red blood cells for hemoglobin less than 7 or hematocrit less than 21.  4.  Recommend thromboelastography at this point with heparinase.  5.  Octreotide 50 mcg IV bolus followed by 50 mcg IV per hour.  6.  Pantoprazole 80 mg IV bolus followed by 8 mg IV per hour.  7.  Avoid CT angiography, unfortunately, due to renal insufficiency, this   would be helpful in localizing gastrointestinal bleeding.  8.  Maintain 2 large bore IVs and consider placement of a central venous   catheter.  9.  EGD plus or minus colonoscopy urgently once the patient resuscitated.  The   above was discussed with the patient and her spouse.  I will defer to the   hospitalist and critical care doctors for management of the patient's other   comorbid conditions, she may require pressor support as well.  If not    responding to volume resuscitation.    Thank you for allowing me to participate in the care of this patient.       ____________________________________     MD LYLE PEREZ / MAN    DD:  03/01/2017 09:01:46  DT:  03/01/2017 11:16:48    D#:  [id number]  Job#:  [id number]    cc: GUSTAVO ALVAREZ MD

## 2017-03-01 NOTE — IP AVS SNAPSHOT
3/7/2017          Shannon Balbuena  [address] [place] [address]  [place] [address]    Dear Shannon:    [facility] wants to ensure your discharge home is safe and you or your loved ones have had all your questions answered regarding your care after you leave the hospital.    You may receive a telephone call within two days of your discharge.  This call is to make certain you understand your discharge instructions as well as ensure we provided you with the best care possible during your stay with us.     The call will only last approximately 3-5 minutes and will be done by a nurse.    Once again, we want to ensure your discharge home is safe and that you have a clear understanding of any next steps in your care.  If you have any questions or concerns, please do not hesitate to contact us, we are here for you.  Thank you for choosing [facility] for your healthcare needs.    Sincerely,    Nolberto Santoyo    [facility]

## 2017-03-01 NOTE — PROCEDURES
CENTRAL LINE PLACEMENT   Indication:  Shock, need for pressors    Performed by: Stephanie Bradley MD   Supervising: Edwin ESCOBEDO  Time Out: A time-out was completed verifying consent obtained, correct patient, procedure, positioning, and correct supplies were present.   Consent on Chart: [X] Yes or [] No     PROCEDURE:  The area of the Right IJV was prepped and draped in sterile fashion. All participants observed sterile technique and hats, masks, sterile gowns and gloves were worn.   Subcutaneous 1% lidocaine was injected for local anesthesia.   The patient was placed in Trendelenburg's position.   All three triple lumen ports were flushed with saline and then locked.  Ultrasound guidance was used and a standard central venous catheter kit needle was used to cannulate the Right IJV vein and a guidewire was place through the needle into the vein. Using the Seldinger technique, a triple lumen central venous catheter was placed over the wire into the vein. Non-pulsatile blood was aspirated from all ports, and all ports were flushed with a total of 10 cc's of sterile solution.   Dr. Pineda was present at bedside to supervise    Actual insertion length: 16 cm   The catheter was sutured into place with [X] 2.0 or [] 3.0 suture and an occlusive sterile dressing with BioPatch was applied.     Attempts: TWO  Complications: None   Blood Loss: Minimal   Post-Procedure: Vitals were stable and patient tolerated procedure well.   CVC Placement Verification:  CXR: [X] Yes     Stephanie Bradley MD , PGY-2

## 2017-03-01 NOTE — H&P
PRIMARY CARE PROVIDER:  Not listed.    CONSULTANTS:  GI, Dr. Bishop Maharaj.    CHIEF COMPLAINT ON ADMISSION:  Rectal bleed, history of maroon-colored rectal   bleed.    HISTORY OF PRESENT ILLNESS:  Patient is a pleasant 66-year-old    female with known history of chronic low back pain with hypertension and   hypothyroidism, presents with complaints of dark maroon stool this a.m.    Patient was brought in by her partner, Raj.  History is obtained from the   patient and her partner.  Patient did report 4-5 days of epigastric abdominal   pain.  Patient report it as dull with no radiation.  Patient was taking about   4 ibuprofens daily for her low back pain.  Patient stated that she has been on   this for several months, did not have increased use of the NSAIDs.  Patient   does report a history of drinking about 2 cocktails a night.  Denies any prior   history of GI bleed.  Denies any cardiac history.  Patient woke up early this   morning, went to the bathroom and did not return to bed.  Patient's partner   went to check on the patient and found her surrounded by a pool of   maroon-colored stool, reported as a lot.  Patient was carried into the room by   her partner and EMS was called.  Patient denies any loss of consciousness.    Denies any chest pain.  On my evaluation, patient has no complaints of pain,   nausea, vomiting, or fevers.  Patient does report chills.  Patient denies any   recent antibiotic use.  She does report about 3-4 days of diarrhea, but has   been maintaining her diet.  She has no other complaints at this time.  She   does report generalized weakness.    PAST MEDICAL HISTORY:  Includes hypertension, hypothyroidism, and chronic low   back pain.    PAST SURGICAL HISTORY:  Mass removal from her right nipple, appendectomy, hip   replacement.    ALLERGIES:  CODEINE.    SOCIAL HISTORY:  Patient lives with her partner for over 10 years, Raj.    Reports drinking about 2 cocktails a night.   Quit smoking about 5 years ago,   was smoking less than quarter pack to half pack per day for over 20 years.    Denies any drug use.  Functional with ADLs and IADLs.    CODE STATUS:  Full code.    FAMILY HISTORY:  Reviewed and noncontributory to this presentation.    CURRENT HOME MEDICATIONS:  Include Synthroid 112 mcg daily, Univasc 15 mg q.   evening.    REVIEW OF SYSTEMS:  As per HPI.  All other systems are reviewed and negative.    PHYSICAL EXAMINATION:  VITAL SIGNS:  On admission, temperature is 36.1, pulse of 100, respiratory   rate of 22, satting 100% on 4 liters nasal cannula, blood pressure is 84/52.  GENERAL:  Patient is alert and oriented x4, in no acute distress, does appear   pale.  HEENT:  Normocephalic, atraumatic.  Mucous membranes are dry.  Extraocular   muscles intact.  Negative scleral icterus.  NECK:  No JVD.  No thyromegaly.  CARDIOVASCULAR:  S1, S2 is regular.  No murmurs noted.  RESPIRATORY:  Lungs are clear to auscultation bilaterally.  No crackles,   wheezes, or rales.  CHEST WALL:  Nontender to palpation.  No masses noted.  BREASTS:  Her right nipple is inverted.  No tenderness to palpation.  No   masses.  ABDOMEN:  Bowel sounds are positive, soft, nontender, nondistended.  EXTREMITIES:  No lower extremity edema.  Distal pulses palpable bilaterally.    No calf tenderness to palpation.  Capillary refill is greater than 2 seconds.    She does have clubbing noted in her fingers.  SKIN:  Positive pallor.  Negative purpura, ecchymosis.  PSYCHIATRIC:  Does not appear anxious or depressed.  NEUROLOGIC:  Cranial nerves grossly intact.  Moving all extremities   spontaneously.  Muscle strength +5/5 bilateral upper extremity and lower   extremity.    LABORATORY DATA:  On admission, white count of 8.4, hemoglobin 4.3-6.4,   platelet count of 148, segs of 68%.  Sodium 130, potassium 4.1, CO2 of 7,   anion gap of 31, glucose 197, BUN 98, creatinine 2.67.  LFTs are within normal   limits.  Lactic acid is  14.6.  Troponin is 0.25.  BNP of 116.  INR is 1.47.    Urinalysis negative for leukocyte esterase or nitrites with trace ketones,   negative glucose, occult blood trace.    IMAGING:  Chest x-ray is pending.  EKG is pending.    ASSESSMENT AND PLAN:  Patient is a pleasant 66-year-old female with known   history of alcohol use as well as chronic non-steroidal antiinflammatory drugs   use, presents with hematochezia.  1.  Gastrointestinal bleed.  Patient has been given 3 out of 4 units of PRBCs   in the emergency room with appropriate rise in hemoglobin and hematocrit.    With ongoing hypotension, we will continue transfusion for hemoglobin of   greater than 4.  Patient will be kept n.p.o. We will continue fluid hydration.    Patient has been placed on Protonix as well as octreotide.  GI, Dr. Maharaj has been consulted.  I have discussed the case with Dr. Maharaj,   who is recommending close monitoring as well as NG tube placement for gastric   lavage.  There were 2 attempts in the emergency room, in which the patient did   have dark red blood emesis.  We will reattempt in the intensive care unit.    We will try to premedicate.  We will continue hemoglobin and hematocrit every   6 and transfuse as needed.  This is secondary to her history of alcohol use as   well as ongoing non-steroidal antiinflammatory drugs use.  We have advised   patient against both.  We will prophylactically cover with antibiotics with   Rocephin.  2.  Hypotension secondary to acute bleed.  We will start patient on Levophed.    Continue transfusion as well as fluid hydration.  3.  History of hypertension.  We will hold blood pressure medications.  4.  Hypothyroidism.  We will check a TSH.  5.  Lactic acidosis secondary to above.  Patient does not appear septic.  6.  Elevated troponin, likely secondary to type 2 demand ischemia due to bleed   with hypotension.  We will monitor troponins.  We will follow up an   echocardiogram.  7.  Metabolic  acidosis secondary to above with significant lactic acidosis.    We will continue to monitor lactic acid q. 6.  Again, patient does not appear   to be septic with no source of infection.  8.  History of alcohol use.  We have advised cessation.  We will start patient   on vitamin supplements.  We will monitor for withdrawal symptoms.  At this   point, patient does not appear to have any withdrawal symptoms.  Patient may   need to be started on CIWA protocol.  9.  Gastrointestinal and deep venous thrombosis prophylaxis.  Patient is on   Protonix as well as sequential compression devices.  10.  Code status:  Full code.    Patient is in critical condition.  We will continue to monitor closely.  I   have discussed the case with Dr. Maharaj as well as the patient and her   partner at bedside.    Total critical care time is 55 minutes.       ____________________________________     KARLO SHARP DO    EN / NTS    DD:  03/01/2017 09:58:32  DT:  03/01/2017 10:49:23    D#:  [id number]  Job#:  [id number]

## 2017-03-01 NOTE — ED NOTES
Pt had large bloody BM, pt cleaned. New gown provided. MD Fitch notified of BM. Pt remains alert, oriented. 5th unit of blood infusing, Norepi infusing.

## 2017-03-01 NOTE — IP AVS SNAPSHOT
" <p align=\"LEFT\"><IMG SRC=\"//[email or web address]\" alt=\"Image\" WIDTH=\"50%\" HEIGHT=\"200\" BORDER=\"\"></p>                   Name:Shannon Balbuena  Medical Record Number:[id number]  CSN: [id number]    YOB: 1950   Age: 66 y.o.  Sex: female  HT:1.727 m (5' 7.99\") WT: 73.5 kg (162 lb 0.6 oz)          Admit Date: 3/1/2017     Discharge Date:   Today's Date: 3/7/2017  Attending Doctor:  Nilda Rawls D.O.                  Allergies:  Codeine          Follow-up Information     1. Follow up with Edvin BREEN M.D.. Schedule an appointment as soon as possible for a visit in 1 week.    Specialty:  Family Medicine    Contact information    [address] [facility] [address] [phone]          2. Follow up with Bishop Maharaj M.D.. Schedule an appointment as soon as possible for a visit in 4 weeks.    Specialty:  Gastroenterology    Contact information    [address] [id number] [phone]           Medication List      Take these Medications        Instructions    amlodipine 10 MG Tabs   Commonly known as:  NORVASC    Take 1 Tab by mouth every day.   Dose:  10 mg       folic acid 1 MG Tabs   Commonly known as:  FOLVITE    Take 1 Tab by mouth every day.   Dose:  1 mg       levothyroxine 112 MCG Tabs   Commonly known as:  SYNTHROID    Take 112 mcg by mouth Every morning on an empty stomach.   Dose:  112 mcg       moexipril 15 MG Tabs   Commonly known as:  UNIVASC    Take 15 mg by mouth every evening.   Dose:  15 mg       nystatin Powd   Commonly known as:  MYCOSTATIN    Apply 1 Application to affected area(s) 3 times a day.   Dose:  1 Application       omeprazole 20 MG delayed-release capsule   Commonly known as:  PRILOSEC    Take 1 Cap by mouth 2 Times a Day.   Dose:  20 mg       potassium chloride SA 20 MEQ Tbcr   Commonly known as:  Kdur    Take 1 Tab by mouth 2 Times a Day.   Dose:  20 mEq         "

## 2017-03-02 ENCOUNTER — APPOINTMENT (OUTPATIENT)
Dept: RADIOLOGY | Facility: MEDICAL CENTER | Age: 67
DRG: 356 | End: 2017-03-02
Attending: INTERNAL MEDICINE
Payer: COMMERCIAL

## 2017-03-02 PROBLEM — J96.01 ACUTE RESPIRATORY FAILURE WITH HYPOXIA (HCC): Status: ACTIVE | Noted: 2017-03-02

## 2017-03-02 PROBLEM — R57.9 SHOCK CIRCULATORY (HCC): Status: ACTIVE | Noted: 2017-03-02

## 2017-03-02 PROBLEM — E03.9 HYPOTHYROID: Status: ACTIVE | Noted: 2017-03-02

## 2017-03-02 PROBLEM — E87.20 LACTIC ACIDOSIS: Status: ACTIVE | Noted: 2017-03-02

## 2017-03-02 PROBLEM — D64.9 SYMPTOMATIC ANEMIA: Status: RESOLVED | Noted: 2017-03-01 | Resolved: 2017-03-02

## 2017-03-02 PROBLEM — I10 HTN (HYPERTENSION): Status: ACTIVE | Noted: 2017-03-02

## 2017-03-02 PROBLEM — I95.9 HYPOTENSION: Status: RESOLVED | Noted: 2017-03-01 | Resolved: 2017-03-02

## 2017-03-02 PROBLEM — D62 ANEMIA DUE TO BLOOD LOSS, ACUTE: Status: ACTIVE | Noted: 2017-03-02

## 2017-03-02 PROBLEM — E83.51 HYPOCALCEMIA: Status: ACTIVE | Noted: 2017-03-02

## 2017-03-02 LAB
ANION GAP SERPL CALC-SCNC: 7 MMOL/L (ref 0–11.9)
ANION GAP SERPL CALC-SCNC: 7 MMOL/L (ref 0–11.9)
BASE EXCESS BLDA CALC-SCNC: -7 MMOL/L (ref -4–3)
BASE EXCESS BLDA CALC-SCNC: -8 MMOL/L (ref -4–3)
BASOPHILS # BLD AUTO: 0.7 % (ref 0–1.8)
BASOPHILS # BLD: 0.04 K/UL (ref 0–0.12)
BODY TEMPERATURE: ABNORMAL DEGREES
BODY TEMPERATURE: ABNORMAL DEGREES
BUN SERPL-MCNC: 59 MG/DL (ref 8–22)
BUN SERPL-MCNC: 60 MG/DL (ref 8–22)
CA-I BLD ISE-SCNC: 1.24 MMOL/L (ref 1.1–1.3)
CA-I SERPL-SCNC: 1.1 MMOL/L (ref 1.1–1.3)
CA-I SERPL-SCNC: 1.1 MMOL/L (ref 1.1–1.3)
CALCIUM SERPL-MCNC: 7.5 MG/DL (ref 8.5–10.5)
CALCIUM SERPL-MCNC: 7.7 MG/DL (ref 8.5–10.5)
CHLORIDE SERPL-SCNC: 122 MMOL/L (ref 96–112)
CHLORIDE SERPL-SCNC: 122 MMOL/L (ref 96–112)
CO2 BLDA-SCNC: 17 MMOL/L (ref 20–33)
CO2 BLDA-SCNC: 17 MMOL/L (ref 20–33)
CO2 SERPL-SCNC: 17 MMOL/L (ref 20–33)
CO2 SERPL-SCNC: 17 MMOL/L (ref 20–33)
CREAT SERPL-MCNC: 1.34 MG/DL (ref 0.5–1.4)
CREAT SERPL-MCNC: 1.37 MG/DL (ref 0.5–1.4)
EOSINOPHIL # BLD AUTO: 0.05 K/UL (ref 0–0.51)
EOSINOPHIL NFR BLD: 0.8 % (ref 0–6.9)
ERYTHROCYTE [DISTWIDTH] IN BLOOD BY AUTOMATED COUNT: 43.9 FL (ref 35.9–50)
ERYTHROCYTE [DISTWIDTH] IN BLOOD BY AUTOMATED COUNT: 44.1 FL (ref 35.9–50)
ERYTHROCYTE [DISTWIDTH] IN BLOOD BY AUTOMATED COUNT: 44.6 FL (ref 35.9–50)
ERYTHROCYTE [DISTWIDTH] IN BLOOD BY AUTOMATED COUNT: 46.3 FL (ref 35.9–50)
ERYTHROCYTE [DISTWIDTH] IN BLOOD BY AUTOMATED COUNT: 47.5 FL (ref 35.9–50)
GFR SERPL CREATININE-BSD FRML MDRD: 38 ML/MIN/1.73 M 2
GFR SERPL CREATININE-BSD FRML MDRD: 39 ML/MIN/1.73 M 2
GLUCOSE BLD-MCNC: 132 MG/DL (ref 65–99)
GLUCOSE BLD-MCNC: 83 MG/DL (ref 65–99)
GLUCOSE BLD-MCNC: 97 MG/DL (ref 65–99)
GLUCOSE BLD-MCNC: 99 MG/DL (ref 65–99)
GLUCOSE SERPL-MCNC: 248 MG/DL (ref 65–99)
GLUCOSE SERPL-MCNC: 96 MG/DL (ref 65–99)
GRAM STN SPEC: NORMAL
GRAM STN SPEC: NORMAL
HCO3 BLDA-SCNC: 16.4 MMOL/L (ref 17–25)
HCO3 BLDA-SCNC: 16.6 MMOL/L (ref 17–25)
HCT VFR BLD AUTO: 34 % (ref 37–47)
HCT VFR BLD AUTO: 34.4 % (ref 37–47)
HCT VFR BLD AUTO: 34.6 % (ref 37–47)
HCT VFR BLD AUTO: 35.6 % (ref 37–47)
HCT VFR BLD AUTO: 36.3 % (ref 37–47)
HCT VFR BLD CALC: 27 % (ref 37–47)
HGB BLD-MCNC: 11.7 G/DL (ref 12–16)
HGB BLD-MCNC: 11.9 G/DL (ref 12–16)
HGB BLD-MCNC: 12.2 G/DL (ref 12–16)
HGB BLD-MCNC: 12.3 G/DL (ref 12–16)
HGB BLD-MCNC: 12.6 G/DL (ref 12–16)
HGB BLD-MCNC: 9.2 G/DL (ref 12–16)
IMM GRANULOCYTES # BLD AUTO: 0.03 K/UL (ref 0–0.11)
IMM GRANULOCYTES NFR BLD AUTO: 0.5 % (ref 0–0.9)
LACTATE BLD-SCNC: 1 MMOL/L (ref 0.5–2)
LACTATE BLD-SCNC: 1 MMOL/L (ref 0.5–2)
LACTATE BLD-SCNC: 1.1 MMOL/L (ref 0.5–2)
LV EJECT FRACT  99904: 70
LV EJECT FRACT MOD 2C 99903: 57.12
LV EJECT FRACT MOD 4C 99902: 70.93
LV EJECT FRACT MOD BP 99901: 63.96
LYMPHOCYTES # BLD AUTO: 1.23 K/UL (ref 1–4.8)
LYMPHOCYTES NFR BLD: 20.5 % (ref 22–41)
MAGNESIUM SERPL-MCNC: 1.9 MG/DL (ref 1.5–2.5)
MCH RBC QN AUTO: 29.7 PG (ref 27–33)
MCH RBC QN AUTO: 29.9 PG (ref 27–33)
MCH RBC QN AUTO: 29.9 PG (ref 27–33)
MCH RBC QN AUTO: 30.2 PG (ref 27–33)
MCH RBC QN AUTO: 30.3 PG (ref 27–33)
MCHC RBC AUTO-ENTMCNC: 34.4 G/DL (ref 33.6–35)
MCHC RBC AUTO-ENTMCNC: 34.6 G/DL (ref 33.6–35)
MCHC RBC AUTO-ENTMCNC: 34.6 G/DL (ref 33.6–35)
MCHC RBC AUTO-ENTMCNC: 34.7 G/DL (ref 33.6–35)
MCHC RBC AUTO-ENTMCNC: 35.3 G/DL (ref 33.6–35)
MCV RBC AUTO: 86 FL (ref 81.4–97.8)
MCV RBC AUTO: 86.1 FL (ref 81.4–97.8)
MCV RBC AUTO: 86.3 FL (ref 81.4–97.8)
MCV RBC AUTO: 86.4 FL (ref 81.4–97.8)
MCV RBC AUTO: 87.3 FL (ref 81.4–97.8)
MONOCYTES # BLD AUTO: 0.44 K/UL (ref 0–0.85)
MONOCYTES NFR BLD AUTO: 7.3 % (ref 0–13.4)
NEUTROPHILS # BLD AUTO: 4.22 K/UL (ref 2–7.15)
NEUTROPHILS NFR BLD: 70.2 % (ref 44–72)
NRBC # BLD AUTO: 0 K/UL
NRBC BLD AUTO-RTO: 0 /100 WBC
O2/TOTAL GAS SETTING VFR VENT: 100 %
O2/TOTAL GAS SETTING VFR VENT: 90 %
PCO2 BLDA: 26.6 MMHG (ref 26–37)
PCO2 BLDA: 29.3 MMHG (ref 26–37)
PCO2 TEMP ADJ BLDA: 26.9 MMHG (ref 26–37)
PCO2 TEMP ADJ BLDA: 29.7 MMHG (ref 26–37)
PH BLDA: 7.36 [PH] (ref 7.4–7.5)
PH BLDA: 7.4 [PH] (ref 7.4–7.5)
PH TEMP ADJ BLDA: 7.36 [PH] (ref 7.4–7.5)
PH TEMP ADJ BLDA: 7.39 [PH] (ref 7.4–7.5)
PHOSPHATE SERPL-MCNC: 2.9 MG/DL (ref 2.5–4.5)
PLATELET # BLD AUTO: 102 K/UL (ref 164–446)
PLATELET # BLD AUTO: 87 K/UL (ref 164–446)
PLATELET # BLD AUTO: 98 K/UL (ref 164–446)
PLATELET # BLD AUTO: 99 K/UL (ref 164–446)
PLATELET # BLD AUTO: 99 K/UL (ref 164–446)
PMV BLD AUTO: 10.2 FL (ref 9–12.9)
PMV BLD AUTO: 10.3 FL (ref 9–12.9)
PMV BLD AUTO: 10.4 FL (ref 9–12.9)
PMV BLD AUTO: 10.4 FL (ref 9–12.9)
PMV BLD AUTO: 9.8 FL (ref 9–12.9)
PO2 BLDA: 114 MMHG (ref 64–87)
PO2 BLDA: 156 MMHG (ref 64–87)
PO2 TEMP ADJ BLDA: 115 MMHG (ref 64–87)
PO2 TEMP ADJ BLDA: 158 MMHG (ref 64–87)
POTASSIUM BLD-SCNC: 3.5 MMOL/L (ref 3.6–5.5)
POTASSIUM SERPL-SCNC: 3.3 MMOL/L (ref 3.6–5.5)
POTASSIUM SERPL-SCNC: 3.7 MMOL/L (ref 3.6–5.5)
RBC # BLD AUTO: 3.94 M/UL (ref 4.2–5.4)
RBC # BLD AUTO: 3.94 M/UL (ref 4.2–5.4)
RBC # BLD AUTO: 4.02 M/UL (ref 4.2–5.4)
RBC # BLD AUTO: 4.12 M/UL (ref 4.2–5.4)
RBC # BLD AUTO: 4.22 M/UL (ref 4.2–5.4)
SAO2 % BLDA: 99 % (ref 93–99)
SAO2 % BLDA: 99 % (ref 93–99)
SIGNIFICANT IND 70042: NORMAL
SIGNIFICANT IND 70042: NORMAL
SITE SITE: NORMAL
SITE SITE: NORMAL
SODIUM BLD-SCNC: 147 MMOL/L (ref 135–145)
SODIUM SERPL-SCNC: 146 MMOL/L (ref 135–145)
SODIUM SERPL-SCNC: 146 MMOL/L (ref 135–145)
SOURCE SOURCE: NORMAL
SOURCE SOURCE: NORMAL
SPECIMEN DRAWN FROM PATIENT: ABNORMAL
SPECIMEN DRAWN FROM PATIENT: ABNORMAL
TROPONIN I SERPL-MCNC: 0.23 NG/ML (ref 0–0.04)
TROPONIN I SERPL-MCNC: 0.26 NG/ML (ref 0–0.04)
TROPONIN I SERPL-MCNC: 0.27 NG/ML (ref 0–0.04)
WBC # BLD AUTO: 5.5 K/UL (ref 4.8–10.8)
WBC # BLD AUTO: 6 K/UL (ref 4.8–10.8)
WBC # BLD AUTO: 7 K/UL (ref 4.8–10.8)
WBC # BLD AUTO: 7.6 K/UL (ref 4.8–10.8)
WBC # BLD AUTO: 9 K/UL (ref 4.8–10.8)

## 2017-03-02 PROCEDURE — 82330 ASSAY OF CALCIUM: CPT

## 2017-03-02 PROCEDURE — 37799 UNLISTED PX VASCULAR SURGERY: CPT

## 2017-03-02 PROCEDURE — E0191 PROTECTOR HEEL OR ELBOW: HCPCS | Performed by: HOSPITALIST

## 2017-03-02 PROCEDURE — 94150 VITAL CAPACITY TEST: CPT

## 2017-03-02 PROCEDURE — 93306 TTE W/DOPPLER COMPLETE: CPT

## 2017-03-02 PROCEDURE — 0B948ZX DRAINAGE OF RIGHT UPPER LOBE BRONCHUS, VIA NATURAL OR ARTIFICIAL OPENING ENDOSCOPIC, DIAGNOSTIC: ICD-10-PCS | Performed by: INTERNAL MEDICINE

## 2017-03-02 PROCEDURE — 700101 HCHG RX REV CODE 250: Performed by: INTERNAL MEDICINE

## 2017-03-02 PROCEDURE — 700105 HCHG RX REV CODE 258: Performed by: INTERNAL MEDICINE

## 2017-03-02 PROCEDURE — 82803 BLOOD GASES ANY COMBINATION: CPT

## 2017-03-02 PROCEDURE — 93306 TTE W/DOPPLER COMPLETE: CPT | Mod: 26 | Performed by: INTERNAL MEDICINE

## 2017-03-02 PROCEDURE — 700111 HCHG RX REV CODE 636 W/ 250 OVERRIDE (IP): Performed by: INTERNAL MEDICINE

## 2017-03-02 PROCEDURE — 83735 ASSAY OF MAGNESIUM: CPT

## 2017-03-02 PROCEDURE — 302978 HCHG BRONCHOSCOPY-DIAGNOSTIC

## 2017-03-02 PROCEDURE — 84100 ASSAY OF PHOSPHORUS: CPT

## 2017-03-02 PROCEDURE — 700111 HCHG RX REV CODE 636 W/ 250 OVERRIDE (IP): Performed by: HOSPITALIST

## 2017-03-02 PROCEDURE — 84484 ASSAY OF TROPONIN QUANT: CPT | Mod: 91

## 2017-03-02 PROCEDURE — 83605 ASSAY OF LACTIC ACID: CPT | Mod: 91

## 2017-03-02 PROCEDURE — 71010 DX-CHEST-PORTABLE (1 VIEW): CPT

## 2017-03-02 PROCEDURE — 80048 BASIC METABOLIC PNL TOTAL CA: CPT | Mod: 91

## 2017-03-02 PROCEDURE — 99291 CRITICAL CARE FIRST HOUR: CPT | Performed by: HOSPITALIST

## 2017-03-02 PROCEDURE — 85027 COMPLETE CBC AUTOMATED: CPT | Mod: 91

## 2017-03-02 PROCEDURE — 87205 SMEAR GRAM STAIN: CPT | Mod: 91

## 2017-03-02 PROCEDURE — 700105 HCHG RX REV CODE 258: Performed by: HOSPITALIST

## 2017-03-02 PROCEDURE — A9270 NON-COVERED ITEM OR SERVICE: HCPCS | Performed by: INTERNAL MEDICINE

## 2017-03-02 PROCEDURE — 31645 BRNCHSC W/THER ASPIR 1ST: CPT | Performed by: INTERNAL MEDICINE

## 2017-03-02 PROCEDURE — 700101 HCHG RX REV CODE 250: Performed by: HOSPITALIST

## 2017-03-02 PROCEDURE — 82962 GLUCOSE BLOOD TEST: CPT | Mod: 91

## 2017-03-02 PROCEDURE — C9113 INJ PANTOPRAZOLE SODIUM, VIA: HCPCS | Performed by: INTERNAL MEDICINE

## 2017-03-02 PROCEDURE — 85025 COMPLETE CBC W/AUTO DIFF WBC: CPT

## 2017-03-02 PROCEDURE — 87070 CULTURE OTHR SPECIMN AEROBIC: CPT | Mod: 91

## 2017-03-02 PROCEDURE — 700102 HCHG RX REV CODE 250 W/ 637 OVERRIDE(OP): Performed by: INTERNAL MEDICINE

## 2017-03-02 PROCEDURE — 770022 HCHG ROOM/CARE - ICU (200)

## 2017-03-02 PROCEDURE — 99291 CRITICAL CARE FIRST HOUR: CPT | Mod: 25 | Performed by: INTERNAL MEDICINE

## 2017-03-02 PROCEDURE — 94003 VENT MGMT INPAT SUBQ DAY: CPT

## 2017-03-02 RX ORDER — MAGNESIUM SULFATE HEPTAHYDRATE 40 MG/ML
2 INJECTION, SOLUTION INTRAVENOUS ONCE
Status: COMPLETED | OUTPATIENT
Start: 2017-03-02 | End: 2017-03-02

## 2017-03-02 RX ORDER — POTASSIUM CHLORIDE 14.9 MG/ML
20 INJECTION INTRAVENOUS ONCE
Status: COMPLETED | OUTPATIENT
Start: 2017-03-02 | End: 2017-03-02

## 2017-03-02 RX ORDER — SODIUM CHLORIDE AND POTASSIUM CHLORIDE 150; 450 MG/100ML; MG/100ML
INJECTION, SOLUTION INTRAVENOUS CONTINUOUS
Status: DISCONTINUED | OUTPATIENT
Start: 2017-03-02 | End: 2017-03-03

## 2017-03-02 RX ADMIN — CHLORHEXIDINE GLUCONATE 15 ML: 1.2 RINSE ORAL at 07:44

## 2017-03-02 RX ADMIN — THIAMINE HYDROCHLORIDE 100 MG: 100 INJECTION, SOLUTION INTRAMUSCULAR; INTRAVENOUS at 08:12

## 2017-03-02 RX ADMIN — MAGNESIUM SULFATE IN WATER 2 G: 40 INJECTION, SOLUTION INTRAVENOUS at 10:31

## 2017-03-02 RX ADMIN — CHLORHEXIDINE GLUCONATE 15 ML: 1.2 RINSE ORAL at 21:41

## 2017-03-02 RX ADMIN — OCTREOTIDE ACETATE 50 MCG/HR: 200 INJECTION, SOLUTION INTRAVENOUS; SUBCUTANEOUS at 06:28

## 2017-03-02 RX ADMIN — DEXMEDETOMIDINE HYDROCHLORIDE 0.1 MCG/KG/HR: 100 INJECTION, SOLUTION INTRAVENOUS at 19:40

## 2017-03-02 RX ADMIN — CEFTRIAXONE 2 G: 2 INJECTION, POWDER, FOR SOLUTION INTRAMUSCULAR; INTRAVENOUS at 07:44

## 2017-03-02 RX ADMIN — SODIUM CHLORIDE: 9 INJECTION, SOLUTION INTRAVENOUS at 03:52

## 2017-03-02 RX ADMIN — PROPOFOL 50 MCG/KG/MIN: 10 INJECTION, EMULSION INTRAVENOUS at 03:52

## 2017-03-02 RX ADMIN — SODIUM CHLORIDE 8 MG/HR: 9 INJECTION, SOLUTION INTRAVENOUS at 17:30

## 2017-03-02 RX ADMIN — FENTANYL CITRATE 25 MCG: 50 INJECTION, SOLUTION INTRAMUSCULAR; INTRAVENOUS at 08:07

## 2017-03-02 RX ADMIN — PROPOFOL 10 MCG/KG/MIN: 10 INJECTION, EMULSION INTRAVENOUS at 12:29

## 2017-03-02 RX ADMIN — FENTANYL CITRATE 50 MCG: 50 INJECTION, SOLUTION INTRAMUSCULAR; INTRAVENOUS at 09:36

## 2017-03-02 RX ADMIN — POTASSIUM CHLORIDE AND SODIUM CHLORIDE: 450; 150 INJECTION, SOLUTION INTRAVENOUS at 11:20

## 2017-03-02 RX ADMIN — FOLIC ACID 1 MG: 5 INJECTION, SOLUTION INTRAMUSCULAR; INTRAVENOUS; SUBCUTANEOUS at 07:44

## 2017-03-02 RX ADMIN — SODIUM CHLORIDE 8 MG/HR: 9 INJECTION, SOLUTION INTRAVENOUS at 06:28

## 2017-03-02 RX ADMIN — POTASSIUM CHLORIDE 20 MEQ: 14.9 INJECTION, SOLUTION INTRAVENOUS at 10:36

## 2017-03-02 ASSESSMENT — LIFESTYLE VARIABLES
ALCOHOL_USE: YES
ON A TYPICAL DAY WHEN YOU DRINK ALCOHOL HOW MANY DRINKS DO YOU HAVE: 2
CONSUMPTION TOTAL: INCOMPLETE
AVERAGE NUMBER OF DAYS PER WEEK YOU HAVE A DRINK CONTAINING ALCOHOL: 7
REASON UNABLE TO ASSESS: INTUBATED
HOW MANY TIMES IN THE PAST YEAR HAVE YOU HAD 5 OR MORE DRINKS IN A DAY: 0

## 2017-03-02 ASSESSMENT — PULMONARY FUNCTION TESTS: FVC: 1.06

## 2017-03-02 NOTE — PROGRESS NOTES
Late entry  1615  Wound RN notified this RN that pt became unresponsive. Dr. Walton and Dr. Kerr at bedside. Pt was intubated. Central line and arterial line placed. EGD preformed. VS documented and medications administered per MAR.  1745   Pt transported to IR with this RN, RT, and 2 additional RNs. VS documented.   1900  Pt transported from IR to unit with this RN, RT, and an additional RN. VS documented.

## 2017-03-02 NOTE — H&P
Surgical History and Physical    Date: 3/1/2017    Requesting Physician: Dr Walton    Consulting Physician: Raj Leonard    Reason for consultation: GI bleeding    CC: Syncope with bleeding per rectum    HPI: This is a 66 y.o. female who is presenting History of bleeding per rectum with dizziness and possible syncope earlier today.  She has been seen by gastroenterology however due to her instability she'll be transferred to the ICU for resuscitation prior to EGD.  An NG tube was attempted to be placed in the emergency room however the patient did not tolerate and only a small amount of bloody aspirate was obtained.  Patient is currently lucid and has received numerous packed red blood cells and continues to have bloody bowel movements.  She does have daily NSAID and alcohol use. He has not been diagnosed with liver disease.  She has never had a colonoscopy.  She denies any family history of colorectal cancer or inflammatory bowel disease.    No past medical history on file.    Past Surgical History   Procedure Laterality Date   • Gastroscopy-endo  3/1/2017     Procedure: GASTROSCOPY-ENDO;  Surgeon: Bishop Maharaj M.D.;  Location: ENDOSCOPY [facility];  Service:    • Gastroscopy with sclerotherapy  3/1/2017     Procedure: GASTROSCOPY WITH SCLEROTHERAPY;  Surgeon: Bishop Maharaj M.D.;  Location: ENDOSCOPY [facility];  Service:        Current Facility-Administered Medications   Medication Dose Route Frequency Provider Last Rate Last Dose   • norepinephrine (LEVOPHED) 8 mg in  mL Infusion  0.5-30 mcg/min Intravenous Continuous Elma Fitch D.O.   Stopped at 03/01/17 1751   • senna-docusate (PERICOLACE or SENOKOT S) 8.6-50 MG per tablet 2 Tab  2 Tab Oral BID Elma Fitch D.O.   Stopped at 03/01/17 1000    And   • polyethylene glycol/lytes (MIRALAX) PACKET 1 Packet  1 Packet Oral QDAY PRN Elma Fitch D.O.        And   • magnesium hydroxide (MILK OF MAGNESIA) suspension 30 mL  30 mL Oral  QDAY PRN MOOSE Duran.O.        And   • bisacodyl (DULCOLAX) suppository 10 mg  10 mg Rectal QDAY PRN MOOSE Duran.O.       • NS infusion   Intravenous Continuous Martin Kerr M.D. 125 mL/hr at 03/01/17 2003     • acetaminophen (TYLENOL) tablet 650 mg  650 mg Oral Q6HRS PRN MOOSE Duran.O.       • cefTRIAXone (ROCEPHIN) 2 g in  mL IVPB  2 g Intravenous DAILY Elma Fitch D.O.   Stopped at 03/01/17 1056   • ondansetron (ZOFRAN) syringe/vial injection 4 mg  4 mg Intravenous Q4HRS PRN MOOSE Duran.O.       • ondansetron (ZOFRAN ODT) dispertab 4 mg  4 mg Oral Q4HRS PRN MOOSE Duran.O.       • pantoprazole (PROTONIX) 80 mg in  mL Infusion  8 mg/hr Intravenous Continuous MOOSE Duran.O. 25 mL/hr at 03/01/17 2040 8 mg/hr at 03/01/17 2040   • octreotide (SANDOSTATIN) 1,250 mcg in  mL Infusion  50 mcg/hr Intravenous Continuous MOOSE Duran.O. 10 mL/hr at 03/01/17 0956 50 mcg/hr at 03/01/17 0956   • thiamine (B-1) 100 mg in D5W 100 mL IVPB  100 mg Intravenous DAILY Elma Fitch D.O.   Stopped at 03/01/17 1250   • folic acid 1 mg in NS 50 mL IVPB  1 mg Intravenous Q24HRS Elma Fitch D.O.   Stopped at 03/01/17 1109   • phenylephrine (RISHI-SYNEPHRINE) 40,000 mcg in  mL Infusion  1-300 mcg/min Intravenous Continuous Elma Fitch D.O.   Stopped at 03/01/17 1755   • propofol (DIPRIVAN) injection  5-80 mcg/kg/min Intravenous Continuous Martin Kerr M.D. 20.5 mL/hr at 03/01/17 2305 55 mcg/kg/min at 03/01/17 2305   • epinephrine (ADRENALIN) injection    Intra-Op Once PRN Bishop Maharaj M.D.   1 mg at 03/01/17 1655   • Respiratory Care per Protocol   Nebulization Continuous RT George Walton M.D.       • lactulose 20 GM/30ML solution 30 mL  30 mL Oral Q24HRS PRN George Walton M.D.       • chlorhexidine (PERIDEX) 0.12 % solution 15 mL  15 mL Mouth/Throat BID George Walton M.D.   15 mL at 03/01/17 2100   • lidocaine (XYLOCAINE) 1%  injection  1-2 mL Tracheal Tube Q30 MIN PRN  George Walton M.D.       • fentaNYL (SUBLIMAZE) injection 25 mcg  25 mcg Intravenous Q HOUR PRN George Walton M.D.        Or   • fentaNYL (SUBLIMAZE) injection 50 mcg  50 mcg Intravenous Q HOUR PRN George Walton M.D.        Or   • fentaNYL (SUBLIMAZE) injection 100 mcg  100 mcg Intravenous Q HOUR PRN George Walton M.D.   100 mcg at 03/01/17 2141   • insulin lispro (HUMALOG) injection 2-9 Units  2-9 Units Subcutaneous Q6HRS George Walton M.D.   2 Units at 03/01/17 1930   • magnesium sulfate IVPB premix 4 g  4 g Intravenous Once George Walton M.D. 25 mL/hr at 03/01/17 2221 4 g at 03/01/17 2221   • potassium chloride in water (KCL) ivpb **Administer in ICU only** 20 mEq  20 mEq Intravenous Once George Walton M.D. 50 mL/hr at 03/01/17 2211 20 mEq at 03/01/17 2211    Followed by   • potassium chloride in water (KCL) ivpb 10 mEq  10 mEq Intravenous Once George Walton M.D.           Social History     Social History   • Marital Status:      Spouse Name: N/A   • Number of Children: N/A   • Years of Education: N/A     Occupational History   • Not on file.     Social History Main Topics   • Smoking status: Not on file   • Smokeless tobacco: Not on file   • Alcohol Use: Not on file   • Drug Use: Not on file   • Sexual Activity: Not on file     Other Topics Concern   • Not on file     Social History Narrative   • No narrative on file       No family history on file.    Allergies:  Codeine    Review of Systems:  Constitutional: Negative for fever, chills or weight loss  HENT: Negative for nosebleeds   Eyes: Negative for changes in vision or photophobia  Respiratory: Negative for cough, shortness of breath or wheezing  Cardiovascular: Negative for chest pain or palpitations  Gastrointestinal: Negative for nausea, vomiting, diarrhea, blood in stool and melena.   Genitourinary: Negative for dysuria or urinary incontinence   Musculoskeletal: Negative for back pain and joint pain.   Skin: Negative for itching  "and rash.  Neurological: Negative for dizziness, lightheadedness or loss of consciousness  Endo/Heme/Allergies: Does not bruise/bleed easily.   Psychiatric/Behavioral: Negative for substance abuse. The patient is not nervous/anxious and does not have insomnia.    Physical Exam:  Blood pressure 127/27, pulse 84, temperature 35.6 °C (96.1 °F), resp. rate 24, height 1.727 m (5' 7.99\"), weight 62.1 kg (136 lb 14.5 oz), SpO2 100 %, not currently breastfeeding.    Constitutional: oriented to person, place, and time.  appears pale and in mild distress.   Head: Normocephalic and atraumatic.   Neck: Normal range of motion. Neck supple. No JVD present. No tracheal deviation present. No thyromegaly present.   Cardiovascular: Normal rate, regular rhythm, normal heart sounds and intact distal pulses.  Exam reveals no gallop and no friction rub.  No murmur heard.  Pulmonary/Chest: Effort increased on Oxygen by NC. No stridor. No respiratory distress.   Abdominal: soft nontender and nondistended without rebound or guarding  Musculoskeletal: Normal range of motion.  exhibits no edema and no tenderness.   Neurological: he is alert and oriented to person, place, and time. Coordination normal.   Skin: Skin is warm and dry. No rash noted. he is not diaphoretic. No erythema. No pallor.   Psychiatric: normal mood and affect.  Behavior is normal.       Labs:  Recent Labs      03/01/17   0954  03/01/17   1206  03/01/17 2021   WBC  16.8*  17.0*  5.0   RBC  4.86  4.06*  4.09*   HEMOGLOBIN  14.3  14.3  12.0  12.7   HEMATOCRIT  43.6  36.1*  37.6   MCV  89.7  88.9  91.9   MCH  29.2  29.6  31.1   MCHC  32.6*  33.2*  33.8   RDW  53.1*  53.7*  45.7   PLATELETCT  149*  149*  96*   MPV  11.0  10.7  9.8     Recent Labs      03/01/17   0620  03/01/17 2021   SODIUM  138  148*   POTASSIUM  4.1  3.4*   CHLORIDE  100  119*   CO2  7*  18*   GLUCOSE  197*  218*   BUN  98*  61*   CREATININE  2.67*  1.35   CALCIUM  10.0  7.0*     Recent Labs      " 03/01/17   0620   APTT  23.5*   INR  1.47*     Recent Labs      03/01/17   0620   ASTSGOT  23   ALTSGPT  13   TBILIRUBIN  0.5   ALKPHOSPHAT  76   GLOBULIN  3.8*   INR  1.47*         Radiology:  IR-VISCERAL ANGIOGRAM - SELECTIVE   Final Result      1.  Angiography demonstrating massive extravasation from the gastroduodenal artery with GDA irregularity. Prolapse of the coil mass at the level of the mid GDA consistent with a broad defect in the GDA which may represent tumor erosion into the vessel,    duodenal ulcer erosion into the GDA, or rarely mycotic aneurysm or vasculitis causing vessel breakdown.   2.  Satisfactory coil embolization of the GDA with GDA occlusion.   3.  Tortuosity in the intrahepatic vessels suggesting cirrhotic liver disease.                  INTERPRETING LOCATION: [facility], [facility], [address]      DX-CHEST-PORTABLE (1 VIEW)   Final Result   Addendum 1 of 1   Addendum:   Right upper lobe opacity likely representing upper lobe aspiration or    atelectasis. This is new since the previous x-ray dated 3/1/2017.      Final      1.  Right upper lobe opacity likely representing upper lobe atelectasis. This is new since the previous x-ray dated 3/1/2017.   2.  Endotracheal tube is positioned above the song.      DX-CHEST-PORTABLE (1 VIEW)   Final Result         No acute cardiac or pulmonary abnormality is identified. Central line noted at mid SVC level. No pneumothorax identified.      ECHOCARDIOGRAM COMP W/O CONT    (Results Pending)   DX-CHEST-PORTABLE (1 VIEW)    (Results Pending)       Assessment: This is a 66 y.o.the GI bleed     Active Hospital Problems    Diagnosis   • ARF (acute renal failure) (CMS-HCC) [N17.9]   • GI bleed [K92.2]   • Elevated troponin [R79.89]   • Hypotension [I95.9]   • Symptomatic anemia [D64.9]       Plan:   Patient underwent EGD which demonstrated copious bleeding and a duodenal ulcer with possibly neoplastic process. The bleeding was not amenable to endoscopic  intervention.  Patient was then taken to interventional radiology where angiography demonstrated bleeding from the GDA.  Additionally there was the possibility of necrotic tumor eroding through the vessel.  Ultimately the vessel was able to be successfully embolized.  She has since stabilized and remains intubated.hopefully this will  Control her bleeding until a diagnosis can be made for definitive surgical therapy.  I discussed with her partner her high risk eespecially given  The massive transfusion that she has received.  We will continue aggressive supportive care and hold surgical intervention should she bleed again.      Raj Leonard MD PhD  [facility]  Colon and Rectal Surgeon  [phone]

## 2017-03-02 NOTE — CARE PLAN
Adult Ventilation Update    Total Vent Days: 2    Patient Lines/Drains/Airways Status    Active Airway     Name: Placement date: Placement time: Site: Days:    Airway Group ET Tube Oral 7.5 03/01/17  1615  Oral  less than 1                         Plateau Pressure (Q Shift): 17 (03/01/17 1930)  Static Compliance (ml / cm H2O): 44 (03/02/17 0251)    Patient failed trials because of    Barriers to SBT    Length of Weaning Trial        Cough: Productive (03/02/17 0251)  Sputum Amount: Scant (03/02/17 0400)  Sputum Color: Yellow (03/02/17 0400)  Sputum Consistency: Thick (03/02/17 0400)    Mobility Group  Activity Performed: Unable to mobilize (03/01/17 2000)  Pt Calls for Assistance: No (03/01/17 1330)  Reason Not Mobilized: Unstable condition (03/01/17 2000)  Mobilization Comments: active bleeding (03/01/17 1330)    Events/Summary/Plan: Resp. culture sent to lab (03/02/17 0251)

## 2017-03-02 NOTE — WOUND TEAM
Wound Team assessed bilateral heels, they are blanching and intact. As this RN was placing a pillow under patient's heels she stopped responding. Patient did not respond to her partner when he spoke to her. This RN alerted RN outside of the room who came in and began working with patient, notified MUMTAZ Woods. Dr. Kerr in room. Wound Team will return when patient is stable to assess elbows.

## 2017-03-02 NOTE — FLOWSHEET NOTE
03/02/17 0834   Weaning Parameters   RR (bpm) 15   #FVC / Vital Capacity (liters)  1.06   NIF (cm H2O)  -20.5   Rapid Shallow Breathing Index (RR/VT) 27   Spontaneous VE 7.6   Spontaneous    Pt tolerated SBT well.

## 2017-03-02 NOTE — CONSULTS
DATE OF SERVICE:  03/01/2017    PULMONARY CRITICAL CARE CONSULTATION    REQUESTING PHYSICIAN:  Martin Kerr MD.    REASON FOR CONSULTATION:  Massive gastrointestinal bleed with acute   hemorrhagic shock requiring massive resuscitation.    HISTORY OF PRESENT ILLNESS:  This is a 66-year-old female who has a history of   hypertension, hypothyroidism and alcohol use.  She presented to the ED this   morning after waking at around 4:00 a.m. passing a large amount of bright red   blood per rectum.  She had near syncope.  Additional history includes frequent   use of nonsteroidal anti-inflammatories for back pain.  She apparently drinks   whiskey nightly.  She was seen by gastroenterology in the ED.  She was   admitted and placed on octreotide and Protonix.  While in the intensive care   unit, she was started on vasopressor therapy and received 4 units of red blood   cells.  TEG study was done and she did not appear particularly coagulopathic,   although her INR was slightly elevated.  I suspect that she has alcoholic   related liver disease.  She had massive bright red blood per stool and   hematemesis and became minimally responsive and hypotensive.  Large volume   resuscitation was initiated.  She was intubated.  Crystalloid volume   resuscitation was started and I placed an arterial catheter and a left   subclavian large-bore introducer Cordis catheter.  Rapid infuser and air was   rapidly brought to the bedside with a red box 1:1:1 transfusion initiated.  GI   returned to bedside and performed upper endoscopy once her blood pressure   stabilized with volume and blood product transfusion.  He noted arterial   bleeding from the distal duodenal bulb with possible tumor in the area.  He   was unable to perform any local therapy.  We discussed this with   interventional radiology and she was taken emergently down for gastroduodenal   artery embolization.    PAST MEDICAL HISTORY:  1.  Hypertension.  2.  Hypothyroidism.  3.   Alcohol use.    ALLERGIES:  CODEINE.    MEDICATIONS:  Moexipril, levothyroxine and ibuprofen.    SOCIAL HISTORY:  She is apparently a nonsmoker, but does drink 2 shots of   whiskey a night.  I have updated her  who has been present at bedside.    FAMILY HISTORY AND REVIEW OF SYSTEMS:  Unobtainable at this time.    PHYSICAL EXAMINATION:  VITAL SIGNS:  She is hypotensive, being resuscitated, unresponsive.  HEENT:  Pupils are equal, round and sluggishly reactive.  Sclerae are pale.    Oral mucous membranes, fresh blood noted as above.  NECK:  Supple, trachea midline.  CHEST AND LUNGS:  Scattered coarse rhonchi, diminished.  CARDIOVASCULAR:  Distant.  ABDOMEN:  Slightly distended, but soft and ___ palpable mass or pulsation.  EXTREMITIES:  Without edema, clubbing or cyanosis.  Nail beds are pale.    Capillary refill is delayed.    DIAGNOSTIC DATA:  Hemoglobin is 12, repeat at 7.5 after red cells, white blood   cell count 17,000, platelet count 149,000.  Sodium 138, potassium 4.1,   chloride 100, bicarbonate 7, BUN 98, creatinine 2.67, glucose 197, AST 23, ALT   13, alkaline phosphatase 76, total bilirubin 0.5, lactic acid is 14.6 on   arrival.  Troponin enzyme 0.25.  B-type natriuretic peptide 116. TEG   relatively unremarkable.  INR 1.47.  Arterial blood gas; pH 7.02, pCO2 of 38,   PaO2 of 470, on 100% full vent support.  Chest x-ray, no acute abnormalities   prior to intubation.    IMPRESSION:  1.  Acute upper gastrointestinal bleed secondary to arterial bleeding from the   duodenal bulb eroding ulcer versus necrotic tumor mass.  2.  Acute hypovolemic/hemorrhagic shock.  3.  Acute hypoxic respiratory failure secondary to above, now intubated on   full ventilator support.  4.  Daily alcohol use with possible underlying alcoholic liver disease.  5.  Acute kidney injury with creatinine of 2.67.  6.  History of hypothyroidism.  7.  Hypertension.    DISCUSSION AND RECOMMENDATIONS:  1.  The patient is critically ill.   She has required massive transfusion and   resuscitation for acute hemorrhagic shock.  Central venous catheter and Cordis   introducer catheter as well as arterial catheters have been placed.  She   refused rapid infuser with multiple red box transfusion protocol.  2.  She just underwent successful GDA embolization in interventional   radiology.  We will monitor her very closely post-procedure and monitor NG   output for any signs of rebleeding from below or above.  3.  We will follow serial hemoglobin and hematocrit.  4.  She is at risk for TRALI.  5.  Continue full mechanical ventilatory support.  6.  Follow pH and need for vasopressors, but ___ defer to volume.  7.  There is a troponin leak which is related to                     .  8.  We will obtain EKG and further workup as indicated.  9.  I have updated the patient's  at bedside.  I have discussed the   case with interventional radiology.  General surgery is aware of the case and   immediately available for operative intervention if required as well as   hospitalist and staff at length.  Further recommendations to follow.    Critical care time 75 minutes.  No overlap.       ____________________________________     MD KENAN STANLEY / MAN    DD:  03/01/2017 19:00:14  DT:  03/01/2017 22:49:33    D#:  [id number]  Job#:  [id number]

## 2017-03-02 NOTE — DISCHARGE PLANNING
"BIB [facility]. 2 episodes of blood in stool each day since Sunday.  GIB  GI following.  Future scope.     Remains intubated.  Day 2. 8/40%.  SBT for 1 hr.  This AM.  Nods appropriately.  Moves all extremities. SR.  No Pressors.  No fevers.   BM this AM bright red blood.  Urine output w/ [place].   ABX.        65 y/o  female.  [place] resident.  [facility] care insurance.  Has PCP for post acute needs. boyfriend, Triston Bowling ([phone]); listed in [employer] as pt's emergency contact.     Will follow. Patient lives with her partner for over 10 years, Raj.  Per H&P...\"Reports drinking about 2 cocktails a night.  Quit smoking about 5 years ago,  was smoking less than quarter pack to half pack per day for over 20 years.  Denies any drug use.  Functional with ADLs and IADLs.    Follow for extubation.   [place] today.     "

## 2017-03-02 NOTE — OR SURGEON
Immediate Post-Operative Note      PreOp Diagnosis: hematochezia, anemia of acute gi blood loss    PostOp Diagnosis: duodenal ulceration, hematochezia, anemia of acute gi blood loss     Procedure(s):  GASTROSCOPY-ENDO    Surgeon(s):  Bishop Maharaj M.D.    Anesthesiologist/Type of Anesthesia:  No anesthesia staff entered./* No anesthesia type entered *    Surgical Staff:  Endoscopy Technician: (Unknown)  Sedation/Monitoring Nurse: (Unknown)    Specimen: none    Estimated Blood Loss: 500 cc    Findings: Massive hemorrhage in the duodenal bulb, majority of bulb ulcerated, impossible to identify exact bleeding site.  I injected epi (1:98156) x 9 cc blindly into the region.  No apparent hemostasis, procedure aborted.    Complications: no immediate    Plan/Recommendations: 1) Recommend consider interventional radiology guided therapy vs surgical therapy as soon as possible  2) Continue transfuse, pressors, octreotide for time being, pantoprazole gtt   3)     3/1/2017 4:28 PM Bishop Maharaj

## 2017-03-02 NOTE — PROCEDURES
DATE OF SERVICE:  03/01/2017    PROCEDURE PERFORMED:  Esophagogastroduodenoscopy with attempted hemostasis   (submucosal injection of epinephrine).    INSTRUMENT UTILIZED:  Olympus flexible forward viewing gastroscope.    INDICATIONS:  Massive gastrointestinal hemorrhage.    CONSENT:  Two emergency physicians consent signed by Dr. Walton and myself.    SEDATION:  Patient has received sedation prior to intubation.    PATHOLOGY SPECIMENS:  None.    DETAILS:  The Olympus flexible forward viewing gastroscope was introduced per   the oral route into the esophagus without difficulty.  There was a small   amount of blood in the esophagus.  No varices were noted.  The instrument was   passed into the stomach where there was a large amount of blood and clot   within the dependent portions of the stomach.  The instrument was passed   through the patent pylorus into the duodenal bulb.  There was immediately   noted to be a large amount of blood.  There was apparent pulsating blood flow.    Despite washing with copious fluid and suctioned, I was unable to visualize   precise source of bleeding.  I could tell that the duodenal bulb appeared to   be diffusely ulcerative.  I was unable to tell if this appeared neoplastic or   not.    Next, I injected epinephrine (1:10,000) in a 4-quadrant fashion into the bulb   blindly upon observation with continued flushed infection.  There was no   apparent hemostasis.  At this point, the procedure was aborted.    No immediate complication.    IMPRESSIONS AND FINDINGS:  Duodenal ulceration - massive with hemodynamically   significant massive upper gastrointestinal hemorrhage resulting.  Unclear   whether this is peptic ulceration versus a neoplastic process with erosion   into the duodenal bulb.  In general, I had a very poor visualization secondary   to active bleeding.    PLAN AND RECOMMENDATIONS:  1.  Discussions with Dr. Walton and Dr. Kerr were held and interventional   radiologist was  contacted urgently.  At this point, our plan is to attempt   interventional radiology-guided coil embolization ____ of bleeding.  2.  Consider obtaining general surgery consultation as well.  3.  Continue octreotide and pantoprazole drips.       ____________________________________     MD LYLE PEREZ / MAN    DD:  03/01/2017 17:25:33  DT:  03/01/2017 19:47:13    D#:  [id number]  Job#:  [id number]    cc: MEÑO WELLS MD, Dr. Kerr

## 2017-03-02 NOTE — CARE PLAN
Problem: Venous Thromboembolism (VTW)/Deep Vein Thrombosis (DVT) Prevention:  Goal: Patient will participate in Venous Thrombosis (VTE)/Deep Vein Thrombosis (DVT)Prevention Measures  Outcome: PROGRESSING AS EXPECTED  SCDs in place. Pharmacologic prophylaxis contraindicated.     Problem: Urinary Elimination:  Goal: Ability to reestablish a normal urinary elimination pattern will improve  Outcome: PROGRESSING SLOWER THAN EXPECTED  Piedra in place secondary to strict I+O measurement for critically ill pt.

## 2017-03-02 NOTE — OR SURGEON
Immediate Post- Operative Note        PostOp Diagnosis: ACTIVE UGIB AT ENDOSCOPY, DUODENAL. MASSIVE DUODENAL BLEED AT ANGIO, SOURCE FROM GDA. GDA VESSEL IRREGULARITY, QUESTION NECROTIC TUMOR ERODING THRU VESSEL, ULCERATION INTO VESSEL, RARELY MYCOTIC OR VASCULITIC ANEURYSMAL BREAKDOWN OF VESSEL INTEGRITY.       Procedure(s): R CFA PUNCTURE. CELIAC ANGIO, COMMON HEPATIC ANGIO, GDA ANGIO, GDA COIL EMBOLIZATION, SMA ANGIO, RIGHT ILIAC-FEMORAL ANGIO    SATISFACTORY GDA OCCLUSION WITH NO PERSISTING EXTRAVASATION.      Estimated Blood Loss: Less than 15 ml (FLUSHES)        Complications: None            3/1/2017     6:37 PM     Timmy Shane

## 2017-03-02 NOTE — PROGRESS NOTES
Gastroenterology Progress Note     Author: Case Sanches   Date & Time Created: 3/2/2017 8:48 AM    CC: Acute massive upper GI bleed anemia    Interval History:  H&P: She has had frequent daily use of ibuprofen recently for chronic low back pain.  She also has frequent heartburn, but denies dysphagia, last evening early morning hours around 4:00 a.m., she awoke and had passage of a large amount of bright red blood per rectum, she became lightheaded, EMS was called, per the patient this has never happened before.  She has never had EGD nor colonoscopy nor seen a gastroenterologist.  No pertinent family history of luminal GI disease or liver disease.  The patient does drink alcohol, estimating 2 glasses of whiskey per night.  Her initial labs showed hemoglobin 4.3.  She received 2 units urgently here, her hemoglobin juliet to 6.  She is receiving a third unit.  Her platelet count was low at 100,000.  Her BUN is elevated at 98 with a creatinine of 2.67.  INR was elevated at 1.47.  Troponin I was initially 0.25, the patient denies any chest pain or palpitations or shortness of breath.  She is weak and lightheaded.  She has been having some vague abdominal pain, which is a kind of a generalized.  No identified alleviating or aggravating factors, per se.  She has had loss of appetite and thinks that eating might make the pain worse.  No radiation of the pain.  She is hypotensive.  3/2/17 - EGD was attempted yesterday by Dr. Maharaj.  She had massive amount of blood obscuring duodenum.  Could not be stopped endoscopically, but then went to IR and hemostasis was achieved.  She has been relatively stable overnight without further acute overt bleeding.  On vent.    Review of Systems:  Review of Systems   Unable to perform ROS: intubated       Physical Exam:  Physical Exam   HENT:   Head: Normocephalic and atraumatic.   Eyes: Conjunctivae are normal.   Neck: Neck supple.   Cardiovascular: Normal rate and regular  rhythm.    Pulmonary/Chest:   Coarse breath sounds bilaterally   Abdominal: Soft. Bowel sounds are normal.   Skin: Skin is dry.   Nursing note and vitals reviewed.      Labs:  Recent Labs      03/01/17   1939  03/01/17   2359  03/02/17   0510   ISTATAPH  7.256*  7.398*  7.361*   ISTATAPCO2  38.9*  26.6  29.3   ISTATAPO2  175*  114*  156*   ISTATATCO2  18*  17*  17*   [id number]  99  99  99   ISTATARTHCO3  17.3  16.4*  16.6*   ISTATARTBE  -9*  -7*  -8*   ISTATTEMP  96.8 F  37.2 C  37.3 C   ISTATFIO2  100  100  90   ISTATSPEC  Arterial  Arterial  Arterial   ISTATAPHTC  7.269*  7.395*  7.357*   [id number]  170*  115*  158*     Recent Labs      03/01/17 0620 03/01/17   0954  03/02/17   0300   TROPONINI  0.25*  0.22*  0.27*   BNPBTYPENAT  116*   --    --      Recent Labs      03/01/17 2021 03/02/17   0005  03/02/17   0535   SODIUM  148*  146*  146*   POTASSIUM  3.4*  3.7  3.3*   CHLORIDE  119*  122*  122*   CO2  18*  17*  17*   BUN  61*  59*  60*   CREATININE  1.35  1.34  1.37   MAGNESIUM  1.0*   --   1.9   PHOSPHORUS  5.6*   --   2.9   CALCIUM  7.0*  7.7*  7.5*     Recent Labs      03/01/17 0620 03/01/17 2021 03/02/17   0005  03/02/17   0535   ALTSGPT  13   --    --    --    ASTSGOT  23   --    --    --    ALKPHOSPHAT  76   --    --    --    TBILIRUBIN  0.5   --    --    --    GLUCOSE  197*  218*  248*  96     Recent Labs      03/01/17 0620 03/02/17   0005  03/02/17   0535  03/02/17   0815   RBC  1.82*  1.22*   < >  3.94*  3.94*  4.22   HEMOGLOBIN  6.4*  4.3*   < >  11.9*  11.7*  12.6   HEMATOCRIT  20.4*  13.8*   < >  34.4*  34.0*  36.3*   PLATELETCT  148*  100*   < >  87*  99*  99*   PROTHROMBTM  18.3*   --    --    --    --    APTT  23.5*   --    --    --    --    INR  1.47*   --    --    --    --     < > = values in this interval not displayed.     Recent Labs      03/01/17   0620   03/01/17   1206  03/01/17 2021 03/02/17   0005  03/02/17   0535  03/02/17   0815   WBC  8.4  5.8   < >  17.0*   5.0  5.5  6.0  7.0   NEUTSPOLYS  68.90   < >  85.50*  84.40*   --   70.20   --    LYMPHOCYTES  25.70   < >  5.90*  13.00*   --   20.50*   --    MONOCYTES  4.50   < >  7.70  0.00   --   7.30   --    EOSINOPHILS  0.00   < >  0.00  0.00   --   0.80   --    BASOPHILS  0.00   < >  0.10  0.00   --   0.70   --    ASTSGOT  23   --    --    --    --    --    --    ALTSGPT  13   --    --    --    --    --    --    ALKPHOSPHAT  76   --    --    --    --    --    --    TBILIRUBIN  0.5   --    --    --    --    --    --     < > = values in this interval not displayed.     Hemodynamics:  Temp (24hrs), Av.3 °C (97.4 °F), Min:33.7 °C (92.7 °F), Max:37.2 °C (99 °F)  Temperature: 37.2 °C (99 °F), Monitored Temp: 37 °C (98.6 °F)  Pulse  Av.1  Min: 41  Max: 142Heart Rate (Monitored): (!) 102  Blood Pressure : (!) 127/27 mmHg, Arterial BP: 102/57 mmHg, NIBP: 107/56 mmHg  CVP (mm Hg): (!) 17 MM HG  Respiratory:  Camara Vent Mode: Spont, Rate (breaths/min): 24, PEEP/CPAP: 8, FiO2: 45, P Peak (PIP): 15, P MEAN: 10 Respiration: (!) 24, Pulse Oximetry: 100 %, O2 Daily Delivery Respiratory : Silicone Nasal Cannula        RUL Breath Sounds: Diminished, RML Breath Sounds: Diminished, RLL Breath Sounds: Diminished, YANICK Breath Sounds: Diminished, LLL Breath Sounds: Diminished  Fluids:    Intake/Output Summary (Last 24 hours) at 17 0848  Last data filed at 17 0600   Gross per 24 hour   Intake 5807.81 ml   Output   1575 ml   Net 4232.81 ml     Weight: 62.1 kg (136 lb 14.5 oz)  GI/Nutrition:  Orders Placed This Encounter   Procedures   • Diet NPO     Standing Status: Standing      Number of Occurrences: 1      Standing Expiration Date:      Order Specific Question:  Type:     Answer:  Now [1]     Order Specific Question:  Restrict to:     Answer:  Strict [1]     Medical Decision Making, by Problem:  Active Hospital Problems    Diagnosis   • Shock circulatory (CMS-HCC) [R57.9]   • Acute respiratory failure with hypoxia  (CMS-[facility]) [J96.01]   • ARF (acute renal failure) (CMS-[facility]) [N17.9]   • GI bleed [K92.2]   • Anemia due to blood loss, acute [D62]   • HTN (hypertension) [I10]   • Hypothyroid [E03.9]   • Lactic acidosis [E87.2]   • Hypocalcemia [E83.51]   • Elevated troponin [R79.89]       Impression / Plan:  1.  Acute GI bleed anemia from suspected duodenal source - unclear if ulcer vs neoplasm  2.  Hematochezia  3.  Hypertension  4.  Abdominal pain, generalized  5.  Nausea and vomiting  6.  Renal insufficiency  7.  Coagulopathy  8.  Hypertension  9.  Hypothyroidism     PLAN AND RECOMMENDATIONS:  1.  Continue ICU care  2.  Transfuse packed red blood cells as you are to keep Hb >7  3.  Serial labs including hemoglobin, hematocrit and INR  4.  Will need CT with contrast once renal status improved  5.  Octreotide 50 mcg IV bolus followed by 50 mcg IV per hour  6.  Pantoprazole 80 mg IV bolus followed by 8 mg IV per hour  7.  EGD in few days once stabilized to visualize source of bleeding and obtain biopsy if indicated  8.  Maintain good IV access    Labs reviewed, Medications reviewed, EKG reviewed and Radiology images reviewed

## 2017-03-02 NOTE — WOUND TEAM
Patient seen for wound consult of left elbow and heels.  Heels assessed yesterday by wound team member, no s/s of pressure injury.  Re-assessed today, feet are generally red/blochy and hot, possible cellulitis, RN is going to have  Assess.  Left elbow seems more edematous, pink and blanching.  Adhesive foam placed over bilateral elbows for additional protection.  Heels floated.  Ordered heel float boots for RN.

## 2017-03-02 NOTE — PROCEDURES
Date: 3/1/2017    Procedure:  Arterial Catheter Insertion    Indication: hemorrhagic shock     Physician:  Dr. George Walton MD    Consent:  Emergent    Procedure:  The patient is intubated and on full mechanical vent support with GIB, shock and vasopressors.  Arterial catheter is indicated for continuous invasive BP monitoring to titrate vasoactive agents.  The left groin was prepped and draped using sterile barrier precautions.  An 18g arterial catheter was placed in the femoral radial artery on the first attempt without difficulty. US was used for placement.  A good quality arterial waveform was noted on monitor.  The catheter ws sutured in place and a sterile dressing was applied.  No immediate complications.  EBL < 5 cc.

## 2017-03-02 NOTE — PROGRESS NOTES
IR NOTE:  COILS PLACED IN THE GDA BY DR. VERGARA:  1/2.  [facility] MICRONESTER EMBOLIZATION COIL REF [id number] LOT [id number] X2  3/4/5.  [employer] INTERLOCK 2D [id number] REF [id number] LOT [id number] X3  6.  [employer] INTERLOCK VORTX MIGUELANGEL COIL [id number] REF [id number] LOT [id number]

## 2017-03-02 NOTE — PROGRESS NOTES
IR NOTE: UNABLE TO DOPPLER RIGHT PP AFTER PRESSURE HELD FOR 15MIN, DR. VERGARA AND BEDSIDE NURSE SHDAE MONTOYA.

## 2017-03-02 NOTE — PROGRESS NOTES
Surgery progress note  Patient is awakeand responsive.  Remains intubated.  Making good urine.  Hemoglobin is stable.  Plan for CT pancreas protocol and repeat EGD when stable.  Raj Leonard M.D. PhD  [facility]  Colon and Rectal Surgery  (Office) [phone]  (Cell)  [phone]

## 2017-03-02 NOTE — PROCEDURES
Date: 3/1/2017    Procedure:  Central Venous Catheter Insertion    Indication: hypovolemic shock    Physician:  Dr. George Walton MD    Consent:  Emergent     Procedure:  The patient was actively exsanguinating from and upper GIB and massive resuscitation was in progress.   The left chest was prepped and draped in the usual sterile fashion.  Under full body sterile barrier precautions, a large bore introducer central venous catheter was placed without difficulty in the left SC position.    The lumen were flushed with sterile saline and sterile cap applied.  The line was sutured in place and a sterile dressing was applied.  There were no immediate complications.  A post-procedure CXR will be reviewed.  Estimated blood loss < 5cc.

## 2017-03-02 NOTE — PROGRESS NOTES
Dr. Walton consulted.  Dr. Maharaj called and is at bedside for EGD.  I met with her significant other and updated him on Ms. Balbuena's guarded status.   Blood and FFP transfusing.   Octreotide drip.

## 2017-03-02 NOTE — PROGRESS NOTES
Pulmonary Critical Care Progress Note        Chief Complaint: shock, gib    History of Present Illness: 66 y.o. female admitted through ED with UGIB; found to have bleeding DU; massive transfusion successful GDA embolization by IR;     ROS:   unable to perform due to the patient's inability to effectively communicate    Interval Events:  24 hour interval history reviewed    - awake; follows, low dose prop   - cvp 14-20   - off pressors    - doppler + pulse RLE   - decreasing NGT blood o/p   - HH stable after 20+U blood products   -    - ppi/octreotide    - vent day 2, 8, 40%   - brittney SBT   - CXR RUL atlectasis   - replace Mg       PFSH:  No change.    Respiratory:  Camara Vent Mode: Spont, Rate (breaths/min): 24, Vt Target (mL): 380, PEEP/CPAP: 8, FiO2: 45, P Support: 5, Static Compliance (ml / cm H2O): 128, Weaning Trial Stopped due to:: Pt weaned for 1 hour and returned to rest settings per protocol, Length of Weaning Trial (Hours): 1, Control VTE (exp VT): 541  Pulse Oximetry: 100 %  Chest Tube Drains:          Exam: rhonchi bibasilar and diminished breath sounds moderate  ImagingAvailable data reviewed   Recent Labs      03/01/17   1939  03/01/17   2359  03/02/17   0510   ISTATAPH  7.256*  7.398*  7.361*   ISTATAPCO2  38.9*  26.6  29.3   ISTATAPO2  175*  114*  156*   ISTATATCO2  18*  17*  17*   [id number]  99  99  99   ISTATARTHCO3  17.3  16.4*  16.6*   ISTATARTBE  -9*  -7*  -8*   ISTATTEMP  96.8 F  37.2 C  37.3 C   ISTATFIO2  100  100  90   ISTATSPEC  Arterial  Arterial  Arterial   ISTATAPHTC  7.269*  7.395*  7.357*   [id number]  170*  115*  158*       HemoDynamics:  Pulse: (!) 101, Heart Rate (Monitored): (!) 102  Blood Pressure : (!) 127/27 mmHg, Arterial BP: 140/71 mmHg, NIBP: 141/73 mmHg  CVP (mm Hg): (!) 17 MM HG    Exam: regular rate and rhythm  Imaging: Available data reviewed  Recent Labs      03/01/17   0620  03/01/17   0954  03/02/17   0300   TROPONINI  0.25*  0.22*  0.27*   BNPBTYPENAT  116*    --    --        Neuro:  GCS Total Carrier Coma Score: 8       Exam: no focal deficits noted follows commands, raises two fingers  Imaging: Available data reviewed    Fluids:  Intake/Output       02/28/17 0700 - 03/01/17 0659 (Not Admitted) 03/01/17 0700 - 03/02/17 0659 03/02/17 0700 - 03/03/17 0659      8937-7526 8744-2274 Total [id number] 1900-0659 Total [id number] 1900-0659 Total       Intake    I.V.  --  -- --  752.3  2105.5 2857.8  --  -- --    Magnesium Sulfate Volume -- -- -- -- 100 100 -- -- --    Phenylephrine Volume -- -- -- 346.3 0 346.3 -- -- --    Propofol Volume -- -- -- -- 191.8 191.8 -- -- --    Norepinephrine Volume -- -- -- 406 18.8 424.8 -- -- --    IV Volume (NS) -- -- -- -- 1375 1375 -- -- --    IV Volume (octreotide) -- -- -- -- 120 120 -- -- --    IV Volume (protonix) -- -- -- -- 300 300 -- -- --    Blood  --  -- --  980  1730 2710  --  -- --    Volume (RELEASE RED BLOOD CELLS-ACTIVE BLEEDING) -- -- -- 280 -- 280 -- -- --    Volume (RELEASE FRESH FROZEN PLASMA) -- -- -- 350 -- 350 -- -- --    Volume (RELEASE FRESH FROZEN PLASMA) -- -- -- -- 330 330 -- -- --    Volume (RELEASE RED BLOOD CELLS-ACTIVE BLEEDING) -- -- -- -- 350 350 -- -- --    Volume (RELEASE RED BLOOD CELLS-ACTIVE BLEEDING) -- -- -- -- 350 350 -- -- --    Volume (RELEASE RED BLOOD CELLS-ACTIVE BLEEDING) -- -- -- -- 350 350 -- -- --    Volume (RELEASE RED BLOOD CELLS-ACTIVE BLEEDING) -- -- -- -- 350 350 -- -- --    Volume (RELEASE RED BLOOD CELLS-ACTIVE BLEEDING) -- -- -- 350 -- 350 -- -- --    Enteral  --  -- --  --  240 240  --  -- --    Free Water / Tube Flush -- -- -- -- 240 240 -- -- --    Total Intake -- -- -- 1732.3 4075.5 5807.8 -- -- --       Output    Urine  --  -- --  350  275 625  50  -- 50    Indwelling Cathether -- -- -- 350 275 625 50 -- 50    Drains  --  -- --  --  950 950  50  -- 50    Nasogastric Tube -- -- -- -- 950 950 50 -- 50    Total Output -- -- -- 350 1225 1575 100 -- 100       Net I/O     -- -- -- 1382.3  2850.5 4232.8 -100 -- -100        Weight: 62.1 kg (136 lb 14.5 oz)  Recent Labs      17   0005  17   0535   SODIUM  148*  146*  146*   POTASSIUM  3.4*  3.7  3.3*   CHLORIDE  119*  122*  122*   CO2  18*  17*  17*   BUN  61*  59*  60*   CREATININE  1.35  1.34  1.37   MAGNESIUM  1.0*   --   1.9   PHOSPHORUS  5.6*   --   2.9   CALCIUM  7.0*  7.7*  7.5*       GI/Nutrition:  Exam: abdomen is soft and non-tender  Imaging: Available data reviewed  NPO  Liver Function  Recent Labs      17   0005  17   0535   ALTSGPT  13   --    --    --    ASTSGOT  23   --    --    --    ALKPHOSPHAT  76   --    --    --    TBILIRUBIN  0.5   --    --    --    GLUCOSE  197*  218*  248*  96       Heme:  Recent Labs      17   0005  17   0535  17   0815   RBC  1.82*  1.22*   < >  3.94*  3.94*  4.22   HEMOGLOBIN  6.4*  4.3*   < >  11.9*  11.7*  12.6   HEMATOCRIT  20.4*  13.8*   < >  34.4*  34.0*  36.3*   PLATELETCT  148*  100*   < >  87*  99*  99*   PROTHROMBTM  18.3*   --    --    --    --    APTT  23.5*   --    --    --    --    INR  1.47*   --    --    --    --     < > = values in this interval not displayed.       Infectious Disease:  Temp  Av.3 °C (97.4 °F)  Min: 33.7 °C (92.7 °F)  Max: 37.2 °C (99 °F)  Monitored Temp  Av °C (98.6 °F)  Min: 35.6 °C (96.1 °F)  Max: 37.3 °C (99.1 °F)  Micro: reviewed  Recent Labs      17   1206  17   0005  17   0535  17   0815   WBC  8.4  5.8   < >  17.0*  5.0  5.5  6.0  7.0   NEUTSPOLYS  68.90   < >  85.50*  84.40*   --   70.20   --    LYMPHOCYTES  25.70   < >  5.90*  13.00*   --   20.50*   --    MONOCYTES  4.50   < >  7.70  0.00   --   7.30   --    EOSINOPHILS  0.00   < >  0.00  0.00   --   0.80   --    BASOPHILS  0.00   < >  0.10  0.00   --   0.70   --    ASTSGOT  23   --    --    --    --    --    --    ALTSGPT  13   --    --     --    --    --    --    ALKPHOSPHAT  76   --    --    --    --    --    --    TBILIRUBIN  0.5   --    --    --    --    --    --     < > = values in this interval not displayed.     Current Facility-Administered Medications   Medication Dose Frequency Provider Last Rate Last Dose   • norepinephrine (LEVOPHED) 8 mg in  mL Infusion  0.5-30 mcg/min Continuous Elma Fitch D.O.   Stopped at 03/02/17 0730   • senna-docusate (PERICOLACE or SENOKOT S) 8.6-50 MG per tablet 2 Tab  2 Tab BID SANDI DuranOJoaquin   Stopped at 03/01/17 1000    And   • polyethylene glycol/lytes (MIRALAX) PACKET 1 Packet  1 Packet QDAY PRN Elma Fitch D.O.        And   • magnesium hydroxide (MILK OF MAGNESIA) suspension 30 mL  30 mL QDAY PRN Elma Fitch D.O.        And   • bisacodyl (DULCOLAX) suppository 10 mg  10 mg QDAY PRN SANDI DuranO.       • NS infusion   Continuous Martin Kerr M.D. 125 mL/hr at 03/02/17 0700     • acetaminophen (TYLENOL) tablet 650 mg  650 mg Q6HRS PRN Elma Fitch D.O.       • cefTRIAXone (ROCEPHIN) 2 g in  mL IVPB  2 g DAILY Elma Fitch D.O.   Stopped at 03/02/17 0814   • ondansetron (ZOFRAN) syringe/vial injection 4 mg  4 mg Q4HRS PRN Elma Fitch D.O.       • ondansetron (ZOFRAN ODT) dispertab 4 mg  4 mg Q4HRS PRN Elma Fitch D.O.       • pantoprazole (PROTONIX) 80 mg in  mL Infusion  8 mg/hr Continuous SANDI DuranO. 25 mL/hr at 03/02/17 0700 8 mg/hr at 03/02/17 0700   • octreotide (SANDOSTATIN) 1,250 mcg in  mL Infusion  50 mcg/hr Continuous SANDI DuranO. 10 mL/hr at 03/02/17 0700 50 mcg/hr at 03/02/17 0700   • thiamine (B-1) 100 mg in D5W 100 mL IVPB  100 mg DAILY Elma Fitch D.O. 200 mL/hr at 03/02/17 0812 100 mg at 03/02/17 0812   • folic acid 1 mg in NS 50 mL IVPB  1 mg Q24HRS Elma Fitch D.O. 100 mL/hr at 03/02/17 0744 1 mg at 03/02/17 0744   • phenylephrine (RISHI-SYNEPHRINE) 40,000 mcg in  mL Infusion  1-300 mcg/min Continuous Elma Fitch D.O.    Stopped at 03/01/17 1755   • propofol (DIPRIVAN) injection  5-80 mcg/kg/min Continuous Martin Kerr M.D. 3.7 mL/hr at 03/02/17 0900 10 mcg/kg/min at 03/02/17 0900   • epinephrine (ADRENALIN) injection   Intra-Op Once PRN Bishop Maharaj M.D.   1 mg at 03/01/17 1655   • Respiratory Care per Protocol   Continuous RT George Walton M.D.       • lactulose 20 GM/30ML solution 30 mL  30 mL Q24HRS PRN George Walton M.D.       • chlorhexidine (PERIDEX) 0.12 % solution 15 mL  15 mL BID George Walton M.D.   15 mL at 03/02/17 0744   • lidocaine (XYLOCAINE) 1%  injection  1-2 mL Q30 MIN PRN George Walton M.D.       • fentaNYL (SUBLIMAZE) injection 25 mcg  25 mcg Q HOUR PRN George Walton M.D.   25 mcg at 03/02/17 0807    Or   • fentaNYL (SUBLIMAZE) injection 50 mcg  50 mcg Q HOUR PRN George Walton M.D.        Or   • fentaNYL (SUBLIMAZE) injection 100 mcg  100 mcg Q HOUR PRN George Walton M.D.   100 mcg at 03/01/17 2141   • insulin lispro (HUMALOG) injection 2-9 Units  2-9 Units Q6HRS George Walton M.D.   Stopped at 03/02/17 0000     Last reviewed on 3/1/2017  8:47 AM by Beba Schilling, T    Quality  Measures:  Core Measures & Quality Metrics    Problems:  Acute hypoxic resp failure    - intubated 3/1, due to severe hemorrhagic shock   - cont full vent support today   - RUL atx on CXR today; bronch - mucus plugs and ETT pulled back  Bleeding DU with severe hemorrhagic shock   - massive transfusion resuscitation 3/1   - EGD ? Ulcer vs tumor   - successful GDA embolization 3/1   - will need f/u CT pancrease protocol   - will need f/u EGD   - PPI, doubt need octreotide  STEFAN   - improved today   - avoid nephrotoxins    - hold off lasix  Daily EtOH use   - ? etoh liver dz  + tob        Discussed patient condition and risk of morbidity and/or mortality with RN, RT and QA team.    The patient remains critically ill.  Critical care time = 38 minutes in directly providing and coordinating critical care and  extensive data review.  No time overlap and excludes procedures.

## 2017-03-02 NOTE — PROGRESS NOTES
"Hospital Medicine Progress Note, Adult, Complex               Author: Martin Kerr Date & Time created: 3/2/2017  7:00 AM     Interval History:  Pt seen and evaluated in the ICU. Ms. Balbuena has a hx of htn and hypothyroidism that presented to the ER on 3/1 with maroon-colored stools with a lactic of 14 and Cr of 2.7. She developed hypotension and a severe GI bleed and went into shock. She was intubated and had 3 \"Red Boxes\". EGD showed a mass in the duodenum with an arterial bleed. She underwent arterial coiling by Dr. Shane for further hemostasis.   Propofol at 10. I discussed with Dr. Sanches this morning. Urine 275 ml last night.  I discussed with Dr. Walton, critical care, this morning on Hot Rounds. NG had 1,000 ml dark blood last night. Her significant other and nephew are at bedside and we discussed her condition.   Review of Systems:  Review of Systems   Unable to perform ROS: intubated       Physical Exam:  Physical Exam   Constitutional: No distress.   HENT:   NG   Eyes:   Scleral edema   Neck: Neck supple.   Right IJ central line, left neck cordis.    Cardiovascular: Normal rate and regular rhythm.    Pulmonary/Chest:   Vent, good air movement bilat with a few crackles.    Musculoskeletal: She exhibits edema. She exhibits no tenderness.   Left groin arterial line.   Neurological:   Awake and alert on propofol   Skin: Skin is dry. There is pallor.   Feet red on the soles though not hot.        Labs:  Recent Labs      03/01/17   1939  03/01/17   2359  03/02/17   0510   ISTATAPH  7.256*  7.398*  7.361*   ISTATAPCO2  38.9*  26.6  29.3   ISTATAPO2  175*  114*  156*   ISTATATCO2  18*  17*  17*   [id number]  99  99  99   ISTATARTHCO3  17.3  16.4*  16.6*   ISTATARTBE  -9*  -7*  -8*   ISTATTEMP  96.8 F  37.2 C  37.3 C   ISTATFIO2  100  100  90   ISTATSPEC  Arterial  Arterial  Arterial   ISTATAPHTC  7.269*  7.395*  7.357*   [id number]  170*  115*  158*     Recent Labs      03/01/17   0620  03/01/17   0954  " 17   0300   TROPONINI  0.25*  0.22*  0.27*   BNPBTYPENAT  116*   --    --      Recent Labs      17   0005  17   0535   SODIUM  148*  146*  146*   POTASSIUM  3.4*  3.7  3.3*   CHLORIDE  119*  122*  122*   CO2  18*  17*  17*   BUN  61*  59*  60*   CREATININE  1.35  1.34  1.37   MAGNESIUM  1.0*   --   1.9   PHOSPHORUS  5.6*   --   2.9   CALCIUM  7.0*  7.7*  7.5*     Recent Labs      17   0005  17   0535   ALTSGPT  13   --    --    --    ASTSGOT  23   --    --    --    ALKPHOSPHAT  76   --    --    --    TBILIRUBIN  0.5   --    --    --    GLUCOSE  197*  218*  248*  96     Recent Labs      17   0005  17   0535   RBC  1.82*  1.22*   < >  4.09*  3.94*  3.94*   HEMOGLOBIN  6.4*  4.3*   < >  12.7  11.9*  11.7*   HEMATOCRIT  20.4*  13.8*   < >  37.6  34.4*  34.0*   PLATELETCT  148*  100*   < >  96*  87*  99*   PROTHROMBTM  18.3*   --    --    --    --    APTT  23.5*   --    --    --    --    INR  1.47*   --    --    --    --     < > = values in this interval not displayed.     Recent Labs      17   0617   1206  17   0005  17   0535   WBC  8.4  5.8   < >  17.0*  5.0  5.5  6.0   NEUTSPOLYS  68.90   < >  85.50*  84.40*   --   70.20   LYMPHOCYTES  25.70   < >  5.90*  13.00*   --   20.50*   MONOCYTES  4.50   < >  7.70  0.00   --   7.30   EOSINOPHILS  0.00   < >  0.00  0.00   --   0.80   BASOPHILS  0.00   < >  0.10  0.00   --   0.70   ASTSGOT  23   --    --    --    --    --    ALTSGPT  13   --    --    --    --    --    ALKPHOSPHAT  76   --    --    --    --    --    TBILIRUBIN  0.5   --    --    --    --    --     < > = values in this interval not displayed.           Hemodynamics:  Temp (24hrs), Av.3 °C (97.4 °F), Min:33.7 °C (92.7 °F), Max:37.2 °C (99 °F)  Temperature: 37.2 °C (99 °F), Monitored Temp: 37 °C (98.6 °F)  Pulse  Av.1  Min: 41   Max: 142Heart Rate (Monitored): 72  Blood Pressure : (!) 127/27 mmHg, Arterial BP: 102/57 mmHg, NIBP: 107/56 mmHg  CVP (mm Hg): (!) 17 MM HG  Respiratory:  Camara Vent Mode: APVCMV, Rate (breaths/min): 24, PEEP/CPAP: 8, FiO2: 60, P Peak (PIP): 21, P MEAN: 13 Respiration: (!) 24, Pulse Oximetry: 100 %, O2 Daily Delivery Respiratory : Silicone Nasal Cannula        RUL Breath Sounds: Rhonchi, RML Breath Sounds: Rhonchi, RLL Breath Sounds: Diminished, YANICK Breath Sounds: Rhonchi, LLL Breath Sounds: Diminished  Fluids:    Intake/Output Summary (Last 24 hours) at 03/02/17 0700  Last data filed at 03/02/17 0600   Gross per 24 hour   Intake 5807.81 ml   Output   1575 ml   Net 4232.81 ml     Weight: 62.1 kg (136 lb 14.5 oz)  GI/Nutrition:  Orders Placed This Encounter   Procedures   • Diet NPO     Standing Status: Standing      Number of Occurrences: 1      Standing Expiration Date:      Order Specific Question:  Type:     Answer:  Now [1]     Order Specific Question:  Restrict to:     Answer:  Strict [1]     Medical Decision Making, by Problem:  Active Hospital Problems    Diagnosis   • Shock circulatory (CMS-[facility]) [R57.9]from acute, severe, blood loss requiring pressors, fluids, and blood products. Levophed for hypotension.   • Acute respiratory failure with hypoxia (CMS-[facility]) [J96.01]vented on 3/1   • ARF (acute renal failure) (CMS-[facility]) [N17.9]Cr on admit was 2.7 now 1.37   • GI bleed [K92.2]mass in the duodenum, GI following, Dr. Leonard, surgery, following and we discussed her condition this morning.   • Anemia due to blood loss, acute [D62]   • HTN (hypertension) [I10]   • Hypothyroid [E03.9]   • Lactic acidosis [E87.2]initiallly 14   • Hypocalcemia [E83.51]IV replacement   • Elevated troponin [R79.89]troponin leakage from shock   Hypernatremia: back off IV fluids and change to 1/2 NS  Hypomagnesemia: IV replacement  Hypokalemia: IV replacement.  Labs reviewed, Radiology images reviewed and Medications reviewed  Piedra  catheter: Unconscious / Sedated Patient on a Ventilator  Central line in place: Shock    DVT Prophylaxis: Contraindicated - High bleeding risk  DVT prophylaxis - mechanical: SCDs        pt is critically ill in the ICU. 32 minutes of critical care time spent with pt, consultants, and family and in specific management of shock in the ICU. See orders.

## 2017-03-02 NOTE — PROCEDURES
Procedure: endotracheal intubation    Indication: respiratory failure    Description: 20 mg etomidate and 50 mg rocuronium were given. Using a 4 blade on the glidescope, the vocal cords were visualized. A 7.5 endotracheal tube was placed on first attempt with + color change on end-tidal CO2. Chest xray is pending.

## 2017-03-02 NOTE — CARE PLAN
Problem: Bowel/Gastric:  Goal: Normal bowel function is maintained or improved  Outcome: PROGRESSING SLOWER THAN EXPECTED  Pt is still having multiple episodes of melena.    Problem: Fluid Volume:  Goal: Will maintain balanced intake and output  Outcome: PROGRESSING AS EXPECTED  Pt appears adequately volume resuscitated. Her CVP is > 14 and she is normotensive

## 2017-03-02 NOTE — CARE PLAN
Problem: Bowel/Gastric:  Goal: Normal bowel function is maintained or improved  Outcome: PROGRESSING SLOWER THAN EXPECTED  Pt having frequent incontinent loose BMs    Problem: Skin Integrity  Goal: Risk for impaired skin integrity will decrease  Outcome: PROGRESSING SLOWER THAN EXPECTED  Pt is moderate risk for skin breakdown. Pt is repositioned Q2H. Pillows are used for support, positioning and to float bony prominences; heel float boot utilized. Skin assessment documented in doc flow sheet.

## 2017-03-02 NOTE — DIETARY
Nutrition Services: Consult metabolic cart study    Pt is a 66 y.o. Female with Dx: GI bleed, Symptomatic anemia, Hypotension, ARF (acute renal failure) (CMS-HCC), Elevated troponin    Pt was intubated 3/1. S/p EGD with coil embolization 3/1. Pt is NPO with NGT to suction; per RN, 1 L dark red blood out overnight and 250 ml dark red blood out this morning. Pt is off pressors @ this time. Await diet per GI/Surgery.    RD following for nutrition orders.

## 2017-03-03 ENCOUNTER — APPOINTMENT (OUTPATIENT)
Dept: RADIOLOGY | Facility: MEDICAL CENTER | Age: 67
DRG: 356 | End: 2017-03-03
Attending: INTERNAL MEDICINE
Payer: COMMERCIAL

## 2017-03-03 LAB
ANION GAP SERPL CALC-SCNC: 9 MMOL/L (ref 0–11.9)
BASE EXCESS BLDA CALC-SCNC: -10 MMOL/L (ref -4–3)
BASOPHILS # BLD AUTO: 0.5 % (ref 0–1.8)
BASOPHILS # BLD: 0.04 K/UL (ref 0–0.12)
BODY TEMPERATURE: ABNORMAL DEGREES
BUN SERPL-MCNC: 68 MG/DL (ref 8–22)
CALCIUM SERPL-MCNC: 7.7 MG/DL (ref 8.5–10.5)
CHLORIDE SERPL-SCNC: 123 MMOL/L (ref 96–112)
CO2 BLDA-SCNC: 15 MMOL/L (ref 20–33)
CO2 SERPL-SCNC: 15 MMOL/L (ref 20–33)
CREAT SERPL-MCNC: 1.53 MG/DL (ref 0.5–1.4)
EOSINOPHIL # BLD AUTO: 0.17 K/UL (ref 0–0.51)
EOSINOPHIL NFR BLD: 2.1 % (ref 0–6.9)
ERYTHROCYTE [DISTWIDTH] IN BLOOD BY AUTOMATED COUNT: 47.5 FL (ref 35.9–50)
ERYTHROCYTE [DISTWIDTH] IN BLOOD BY AUTOMATED COUNT: 47.8 FL (ref 35.9–50)
GFR SERPL CREATININE-BSD FRML MDRD: 34 ML/MIN/1.73 M 2
GLUCOSE BLD-MCNC: 100 MG/DL (ref 65–99)
GLUCOSE BLD-MCNC: 84 MG/DL (ref 65–99)
GLUCOSE SERPL-MCNC: 109 MG/DL (ref 65–99)
HCO3 BLDA-SCNC: 13.9 MMOL/L (ref 17–25)
HCT VFR BLD AUTO: 33.1 % (ref 37–47)
HCT VFR BLD AUTO: 33.4 % (ref 37–47)
HGB BLD-MCNC: 11.4 G/DL (ref 12–16)
HGB BLD-MCNC: 11.6 G/DL (ref 12–16)
IMM GRANULOCYTES # BLD AUTO: 0.03 K/UL (ref 0–0.11)
IMM GRANULOCYTES NFR BLD AUTO: 0.4 % (ref 0–0.9)
LACTATE BLD-SCNC: 0.9 MMOL/L (ref 0.5–2)
LACTATE BLD-SCNC: 1 MMOL/L (ref 0.5–2)
LYMPHOCYTES # BLD AUTO: 1.6 K/UL (ref 1–4.8)
LYMPHOCYTES NFR BLD: 20 % (ref 22–41)
MAGNESIUM SERPL-MCNC: 1.8 MG/DL (ref 1.5–2.5)
MCH RBC QN AUTO: 29.7 PG (ref 27–33)
MCH RBC QN AUTO: 30 PG (ref 27–33)
MCHC RBC AUTO-ENTMCNC: 34.4 G/DL (ref 33.6–35)
MCHC RBC AUTO-ENTMCNC: 34.7 G/DL (ref 33.6–35)
MCV RBC AUTO: 86.2 FL (ref 81.4–97.8)
MCV RBC AUTO: 86.3 FL (ref 81.4–97.8)
MONOCYTES # BLD AUTO: 0.65 K/UL (ref 0–0.85)
MONOCYTES NFR BLD AUTO: 8.1 % (ref 0–13.4)
NEUTROPHILS # BLD AUTO: 5.51 K/UL (ref 2–7.15)
NEUTROPHILS NFR BLD: 68.9 % (ref 44–72)
NRBC # BLD AUTO: 0 K/UL
NRBC BLD AUTO-RTO: 0 /100 WBC
O2/TOTAL GAS SETTING VFR VENT: 30 %
PCO2 BLDA: 22.3 MMHG (ref 26–37)
PCO2 TEMP ADJ BLDA: 22.3 MMHG (ref 26–37)
PH BLDA: 7.4 [PH] (ref 7.4–7.5)
PH TEMP ADJ BLDA: 7.4 [PH] (ref 7.4–7.5)
PHOSPHATE SERPL-MCNC: 2.6 MG/DL (ref 2.5–4.5)
PLATELET # BLD AUTO: 85 K/UL (ref 164–446)
PLATELET # BLD AUTO: 92 K/UL (ref 164–446)
PMV BLD AUTO: 11 FL (ref 9–12.9)
PMV BLD AUTO: 11.1 FL (ref 9–12.9)
PO2 BLDA: 76 MMHG (ref 64–87)
PO2 TEMP ADJ BLDA: 76 MMHG (ref 64–87)
POTASSIUM SERPL-SCNC: 3.4 MMOL/L (ref 3.6–5.5)
RBC # BLD AUTO: 3.84 M/UL (ref 4.2–5.4)
RBC # BLD AUTO: 3.87 M/UL (ref 4.2–5.4)
SAO2 % BLDA: 96 % (ref 93–99)
SODIUM SERPL-SCNC: 147 MMOL/L (ref 135–145)
SPECIMEN DRAWN FROM PATIENT: ABNORMAL
TROPONIN I SERPL-MCNC: 0.18 NG/ML (ref 0–0.04)
TROPONIN I SERPL-MCNC: 0.22 NG/ML (ref 0–0.04)
WBC # BLD AUTO: 8 K/UL (ref 4.8–10.8)
WBC # BLD AUTO: 8.1 K/UL (ref 4.8–10.8)

## 2017-03-03 PROCEDURE — 700102 HCHG RX REV CODE 250 W/ 637 OVERRIDE(OP): Performed by: INTERNAL MEDICINE

## 2017-03-03 PROCEDURE — 83735 ASSAY OF MAGNESIUM: CPT

## 2017-03-03 PROCEDURE — 84100 ASSAY OF PHOSPHORUS: CPT

## 2017-03-03 PROCEDURE — 84484 ASSAY OF TROPONIN QUANT: CPT

## 2017-03-03 PROCEDURE — 700101 HCHG RX REV CODE 250: Performed by: HOSPITALIST

## 2017-03-03 PROCEDURE — 94150 VITAL CAPACITY TEST: CPT

## 2017-03-03 PROCEDURE — 700101 HCHG RX REV CODE 250: Performed by: INTERNAL MEDICINE

## 2017-03-03 PROCEDURE — A9270 NON-COVERED ITEM OR SERVICE: HCPCS | Performed by: INTERNAL MEDICINE

## 2017-03-03 PROCEDURE — 71010 DX-CHEST-PORTABLE (1 VIEW): CPT

## 2017-03-03 PROCEDURE — 94003 VENT MGMT INPAT SUBQ DAY: CPT

## 2017-03-03 PROCEDURE — 85025 COMPLETE CBC W/AUTO DIFF WBC: CPT

## 2017-03-03 PROCEDURE — 92610 EVALUATE SWALLOWING FUNCTION: CPT

## 2017-03-03 PROCEDURE — 700105 HCHG RX REV CODE 258: Performed by: INTERNAL MEDICINE

## 2017-03-03 PROCEDURE — 82803 BLOOD GASES ANY COMBINATION: CPT

## 2017-03-03 PROCEDURE — 83605 ASSAY OF LACTIC ACID: CPT

## 2017-03-03 PROCEDURE — 700111 HCHG RX REV CODE 636 W/ 250 OVERRIDE (IP): Performed by: HOSPITALIST

## 2017-03-03 PROCEDURE — 82962 GLUCOSE BLOOD TEST: CPT

## 2017-03-03 PROCEDURE — 700111 HCHG RX REV CODE 636 W/ 250 OVERRIDE (IP): Performed by: INTERNAL MEDICINE

## 2017-03-03 PROCEDURE — 99291 CRITICAL CARE FIRST HOUR: CPT | Performed by: INTERNAL MEDICINE

## 2017-03-03 PROCEDURE — G8997 SWALLOW GOAL STATUS: HCPCS | Mod: CI

## 2017-03-03 PROCEDURE — 770022 HCHG ROOM/CARE - ICU (200)

## 2017-03-03 PROCEDURE — G8996 SWALLOW CURRENT STATUS: HCPCS | Mod: CK

## 2017-03-03 PROCEDURE — 99233 SBSQ HOSP IP/OBS HIGH 50: CPT | Performed by: HOSPITALIST

## 2017-03-03 PROCEDURE — 80048 BASIC METABOLIC PNL TOTAL CA: CPT

## 2017-03-03 PROCEDURE — C9113 INJ PANTOPRAZOLE SODIUM, VIA: HCPCS | Performed by: INTERNAL MEDICINE

## 2017-03-03 RX ORDER — MAGNESIUM SULFATE HEPTAHYDRATE 40 MG/ML
2 INJECTION, SOLUTION INTRAVENOUS ONCE
Status: COMPLETED | OUTPATIENT
Start: 2017-03-03 | End: 2017-03-03

## 2017-03-03 RX ORDER — FUROSEMIDE 10 MG/ML
20 INJECTION INTRAMUSCULAR; INTRAVENOUS EVERY 12 HOURS
Status: DISCONTINUED | OUTPATIENT
Start: 2017-03-03 | End: 2017-03-04

## 2017-03-03 RX ORDER — POTASSIUM CHLORIDE 14.9 MG/ML
20 INJECTION INTRAVENOUS EVERY 12 HOURS
Status: DISCONTINUED | OUTPATIENT
Start: 2017-03-03 | End: 2017-03-04

## 2017-03-03 RX ORDER — POTASSIUM CHLORIDE 29.8 MG/ML
40 INJECTION INTRAVENOUS ONCE
Status: COMPLETED | OUTPATIENT
Start: 2017-03-03 | End: 2017-03-03

## 2017-03-03 RX ADMIN — PROPOFOL 25 MCG/KG/MIN: 10 INJECTION, EMULSION INTRAVENOUS at 03:47

## 2017-03-03 RX ADMIN — FOLIC ACID 1 MG: 5 INJECTION, SOLUTION INTRAMUSCULAR; INTRAVENOUS; SUBCUTANEOUS at 08:16

## 2017-03-03 RX ADMIN — SODIUM CHLORIDE 8 MG/HR: 9 INJECTION, SOLUTION INTRAVENOUS at 15:00

## 2017-03-03 RX ADMIN — CEFTRIAXONE 2 G: 2 INJECTION, POWDER, FOR SOLUTION INTRAMUSCULAR; INTRAVENOUS at 08:16

## 2017-03-03 RX ADMIN — FUROSEMIDE 20 MG: 10 INJECTION, SOLUTION INTRAVENOUS at 21:18

## 2017-03-03 RX ADMIN — DEXMEDETOMIDINE HYDROCHLORIDE 1 MCG/KG/HR: 100 INJECTION, SOLUTION INTRAVENOUS at 04:51

## 2017-03-03 RX ADMIN — POTASSIUM CHLORIDE 40 MEQ: 29.8 INJECTION, SOLUTION INTRAVENOUS at 10:22

## 2017-03-03 RX ADMIN — DEXMEDETOMIDINE HYDROCHLORIDE 1 MCG/KG/HR: 100 INJECTION, SOLUTION INTRAVENOUS at 03:54

## 2017-03-03 RX ADMIN — POTASSIUM CHLORIDE AND SODIUM CHLORIDE: 450; 150 INJECTION, SOLUTION INTRAVENOUS at 04:52

## 2017-03-03 RX ADMIN — OCTREOTIDE ACETATE 50 MCG/HR: 200 INJECTION, SOLUTION INTRAVENOUS; SUBCUTANEOUS at 10:05

## 2017-03-03 RX ADMIN — SODIUM CHLORIDE 8 MG/HR: 9 INJECTION, SOLUTION INTRAVENOUS at 03:45

## 2017-03-03 RX ADMIN — CHLORHEXIDINE GLUCONATE 15 ML: 1.2 RINSE ORAL at 08:16

## 2017-03-03 RX ADMIN — PROPOFOL 10 MCG/KG/MIN: 10 INJECTION, EMULSION INTRAVENOUS at 03:54

## 2017-03-03 RX ADMIN — POTASSIUM CHLORIDE 20 MEQ: 14.9 INJECTION, SOLUTION INTRAVENOUS at 21:19

## 2017-03-03 RX ADMIN — FUROSEMIDE 20 MG: 10 INJECTION, SOLUTION INTRAVENOUS at 10:19

## 2017-03-03 RX ADMIN — MAGNESIUM SULFATE IN WATER 2 G: 40 INJECTION, SOLUTION INTRAVENOUS at 10:26

## 2017-03-03 RX ADMIN — THIAMINE HYDROCHLORIDE 100 MG: 100 INJECTION, SOLUTION INTRAMUSCULAR; INTRAVENOUS at 08:16

## 2017-03-03 ASSESSMENT — PAIN SCALES - GENERAL
PAINLEVEL_OUTOF10: 0

## 2017-03-03 ASSESSMENT — ENCOUNTER SYMPTOMS
MEMORY LOSS: 1
VOMITING: 0
SHORTNESS OF BREATH: 0
FEVER: 0
CHILLS: 0
COUGH: 1
ABDOMINAL PAIN: 1
SPUTUM PRODUCTION: 0

## 2017-03-03 ASSESSMENT — LIFESTYLE VARIABLES: EVER_SMOKED: YES

## 2017-03-03 ASSESSMENT — PULMONARY FUNCTION TESTS: FVC: 1.44

## 2017-03-03 NOTE — PROGRESS NOTES
Gastroenterology Progress Note     Author: Case Sanches   Date & Time Created: 3/3/2017 8:13 AM    CC: Acute massive upper GI bleed anemia    Interval History:  H&P: She has had frequent daily use of ibuprofen recently for chronic low back pain.  She also has frequent heartburn, but denies dysphagia, last evening early morning hours around 4:00 a.m., she awoke and had passage of a large amount of bright red blood per rectum, she became lightheaded, EMS was called, per the patient this has never happened before.  She has never had EGD nor colonoscopy nor seen a gastroenterologist.  No pertinent family history of luminal GI disease or liver disease.  The patient does drink alcohol, estimating 2 glasses of whiskey per night.  Her initial labs showed hemoglobin 4.3.  She received 2 units urgently here, her hemoglobin juliet to 6.  She is receiving a third unit.  Her platelet count was low at 100,000.  Her BUN is elevated at 98 with a creatinine of 2.67.  INR was elevated at 1.47.  Troponin I was initially 0.25, the patient denies any chest pain or palpitations or shortness of breath.  She is weak and lightheaded.  She has been having some vague abdominal pain, which is a kind of a generalized.  No identified alleviating or aggravating factors, per se.  She has had loss of appetite and thinks that eating might make the pain worse.  No radiation of the pain.  She is hypotensive.  3/2/17 - EGD was attempted yesterday by Dr. Maharaj.  She had massive amount of blood obscuring duodenum.  Could not be stopped endoscopically, but then went to IR and hemostasis was achieved.  She has been relatively stable overnight without further acute overt bleeding.  On vent.  3/3/17 - No signs of acute / overt GI bleeding.  No events overnignt.  Planned for extubation today.  Hb stable without further transfusion requirements.    Review of Systems:  Review of Systems   Unable to perform ROS: intubated       Physical  Exam:  Physical Exam   Constitutional: She appears well-developed and well-nourished.   HENT:   Head: Normocephalic and atraumatic.   Eyes: Conjunctivae are normal.   Neck: Neck supple.   Cardiovascular: Normal rate and regular rhythm.    Pulmonary/Chest:   Coarse breath sounds bilaterally   Abdominal: Soft. Bowel sounds are normal.   Skin: Skin is warm and dry.   Nursing note and vitals reviewed.      Labs:  Recent Labs      03/01/17   2359  03/02/17   0510  03/03/17   0505   ISTATAPH  7.398*  7.361*  7.403   ISTATAPCO2  26.6  29.3  22.3*   ISTATAPO2  114*  156*  76   ISTATATCO2  17*  17*  15*   [id number]  99  99  96   ISTATARTHCO3  16.4*  16.6*  13.9*   ISTATARTBE  -7*  -8*  -10*   ISTATTEMP  37.2 C  37.3 C  37.0 C   ISTATFIO2  100  90  30   ISTATSPEC  Arterial  Arterial  Arterial   ISTATAPHTC  7.395*  7.357*  7.403   [id number]  115*  158*  76     Recent Labs      03/01/17   0620   03/02/17   1540  03/02/17   2349  03/03/17   0504   TROPONINI  0.25*   < >  0.23*  0.22*  0.18*   BNPBTYPENAT  116*   --    --    --    --     < > = values in this interval not displayed.     Recent Labs      03/01/17 2021 03/02/17   0005  03/02/17   0535  03/03/17   0504   SODIUM  148*  146*  146*  147*   POTASSIUM  3.4*  3.7  3.3*  3.4*   CHLORIDE  119*  122*  122*  123*   CO2  18*  17*  17*  15*   BUN  61*  59*  60*  68*   CREATININE  1.35  1.34  1.37  1.53*   MAGNESIUM  1.0*   --   1.9  1.8   PHOSPHORUS  5.6*   --   2.9  2.6   CALCIUM  7.0*  7.7*  7.5*  7.7*     Recent Labs      03/01/17   0620   03/02/17   0005  03/02/17   0535  03/03/17   0504   ALTSGPT  13   --    --    --    --    ASTSGOT  23   --    --    --    --    ALKPHOSPHAT  76   --    --    --    --    TBILIRUBIN  0.5   --    --    --    --    GLUCOSE  197*   < >  248*  96  109*    < > = values in this interval not displayed.     Recent Labs      03/01/17   0620   03/02/17   1800  03/02/17   2349  03/03/17   0500   RBC  1.82*  1.22*   < >  4.12*  3.84*  3.87*    HEMOGLOBIN  6.4*  4.3*   < >  12.3  11.4*  11.6*   HEMATOCRIT  20.4*  13.8*   < >  35.6*  33.1*  33.4*   PLATELETCT  148*  100*   < >  98*  92*  85*   PROTHROMBTM  18.3*   --    --    --    --    APTT  23.5*   --    --    --    --    INR  1.47*   --    --    --    --     < > = values in this interval not displayed.     Recent Labs      17   0620   17   0535   17   1800  17   2349  17   0500   WBC  8.4  5.8   < >  5.0   < >  6.0   < >  9.0  8.1  8.0   NEUTSPOLYS  68.90   < >  84.40*   --   70.20   --    --    --   68.90   LYMPHOCYTES  25.70   < >  13.00*   --   20.50*   --    --    --   20.00*   MONOCYTES  4.50   < >  0.00   --   7.30   --    --    --   8.10   EOSINOPHILS  0.00   < >  0.00   --   0.80   --    --    --   2.10   BASOPHILS  0.00   < >  0.00   --   0.70   --    --    --   0.50   ASTSGOT  23   --    --    --    --    --    --    --    --    ALTSGPT  13   --    --    --    --    --    --    --    --    ALKPHOSPHAT  76   --    --    --    --    --    --    --    --    TBILIRUBIN  0.5   --    --    --    --    --    --    --    --     < > = values in this interval not displayed.     Hemodynamics:  No data recorded.  Monitored Temp: 38.2 °C (100.8 °F)  Pulse  Av.7  Min: 41  Max: 142Heart Rate (Monitored): (!) 54  Arterial BP: 139/69 mmHg, NIBP: 132/71 mmHg  CVP (mm Hg): (!) 11 MM HG  Respiratory:  Camara Vent Mode: Spont, Rate (breaths/min): 24, PEEP/CPAP: 8, FiO2: 30, P Peak (PIP): 18, P MEAN: 12 Respiration: (!) 25, Pulse Oximetry: 97 %     Work Of Breathing / Effort: Vented  RUL Breath Sounds: Diminished, RML Breath Sounds: Diminished, RLL Breath Sounds: Diminished, YANICK Breath Sounds: Diminished, LLL Breath Sounds: Diminished  Fluids:    Intake/Output Summary (Last 24 hours) at 17 0848  Last data filed at 17 0600   Gross per 24 hour   Intake 5807.81 ml   Output   1575 ml   Net 4232.81 ml        GI/Nutrition:  Orders Placed This  Encounter   Procedures   • Diet NPO     Standing Status: Standing      Number of Occurrences: 1      Standing Expiration Date:      Order Specific Question:  Type:     Answer:  Now [1]     Order Specific Question:  Restrict to:     Answer:  Strict [1]     Medical Decision Making, by Problem:  Active Hospital Problems    Diagnosis   • Shock circulatory (CMS-[facility]) [R57.9]   • Acute respiratory failure with hypoxia (CMS-[facility]) [J96.01]   • ARF (acute renal failure) (CMS-[facility]) [N17.9]   • GI bleed [K92.2]   • Anemia due to blood loss, acute [D62]   • HTN (hypertension) [I10]   • Hypothyroid [E03.9]   • Lactic acidosis [E87.2]   • Hypocalcemia [E83.51]   • Elevated troponin [R79.89]       Impression / Plan:  1.  Acute GI bleed anemia from suspected duodenal source - unclear if ulcer vs neoplasm  2.  Hematochezia  3.  Hypertension  4.  Abdominal pain, generalized  5.  Nausea and vomiting  6.  Renal insufficiency  7.  Coagulopathy  8.  Hypertension  9.  Hypothyroidism     PLAN AND RECOMMENDATIONS:  1.  Continue ICU care  2.  Transfuse packed red blood cells as you are to keep Hb >7  3.  Serial labs including hemoglobin, hematocrit and INR  4.  Will need CT with contrast once renal status improved  5.  Octreotide 50 mcg IV bolus followed by 50 mcg IV per hour  6.  Pantoprazole 80 mg IV bolus followed by 8 mg IV per hour  7.  EGD in few days once stabilized to visualize source of bleeding and obtain biopsy if indicated - likely Monday  8.  Maintain good IV access    Labs reviewed, Medications reviewed, EKG reviewed and Radiology images reviewed

## 2017-03-03 NOTE — PROGRESS NOTES
Pt removed NG tube. Dr. Walton notified; orders received to not replace NG and continue to monitor.

## 2017-03-03 NOTE — FLOWSHEET NOTE
03/03/17 0942   Weaning Parameters   RR (bpm) 13   #FVC / Vital Capacity (liters)  1.44   NIF (cm H2O)  -30   Rapid Shallow Breathing Index (RR/VT) 21   Spontaneous VE 6.9   Spontaneous    Pt extubated to 3LPM NC

## 2017-03-03 NOTE — DISCHARGE PLANNING
Remains intubated.  Day 3.    8/30%.  Overnight NG out.  Follows  Nods.  SB-SR.   -150's.  GI onboard.  NPO.  Good urine output.   Roceph day 3 0f 7.      Wait for extubation.

## 2017-03-03 NOTE — CARE PLAN
Problem: Ventilation Defect:  Goal: Ability to achieve and maintain unassisted ventilation or tolerate decreased levels of ventilator support  Outcome: PROGRESSING AS EXPECTED  CMV 24/380/30%/8  VENT Day: 2  ETT: 7.5@ 22    SBT x 1 hr Parameters: RR 15 MinVent 7.6  RSBI 27 FVC 1.06 NIF -20.5

## 2017-03-03 NOTE — PROGRESS NOTES
"  Trauma/Surgical Progress Note    Author: Ariela Celayalizabeth Date & Time created: 3/3/2017   7:34 AM     Interval Events:  Stable last 24 hours.  Having melena but hg stable. Abd benign.  Weaning to vent.    Hemodynamics:  Blood pressure 127/27, pulse 100, temperature 37.2 °C (99 °F), resp. rate 25, height 1.727 m (5' 7.99\"), weight 62.1 kg (136 lb 14.5 oz), SpO2 97 %, not currently breastfeeding.     Respiratory:  Camara Vent Mode: Spont, Rate (breaths/min): 24, PEEP/CPAP: 8, FiO2: 30, P Peak (PIP): 18, P MEAN: 12 Respiration: (!) 25, Pulse Oximetry: 97 %     Work Of Breathing / Effort: Vented  RUL Breath Sounds: Diminished, RML Breath Sounds: Diminished, RLL Breath Sounds: Diminished, YANICK Breath Sounds: Diminished, LLL Breath Sounds: Diminished  Fluids:    Intake/Output Summary (Last 24 hours) at 03/03/17 0734  Last data filed at 03/03/17 0600   Gross per 24 hour   Intake 3035.19 ml   Output   1980 ml   Net 1055.19 ml     Admit Weight: 62.143 kg (137 lb)  Current      Physical Exam   Constitutional: She appears well-developed and well-nourished.   HENT:   Head: Normocephalic.   Neck: Neck supple.   Cardiovascular: Normal rate.    Pulmonary/Chest: Effort normal.   Abdominal: Soft. She exhibits no distension. There is no tenderness.   Genitourinary:   Piedra to gravity   Skin: Skin is warm.       Medical Decision Making/Problem List:    Active Hospital Problems    Diagnosis   • Shock circulatory (CMS-[facility]) [R57.9]     Priority: High   • Acute respiratory failure with hypoxia (CMS-[facility]) [J96.01]     Priority: Medium   • ARF (acute renal failure) (CMS-[facility]) [N17.9]     Priority: Medium   • GI bleed [K92.2]     Priority: Medium   • Anemia due to blood loss, acute [D62]   • HTN (hypertension) [I10]   • Hypothyroid [E03.9]   • Lactic acidosis [E87.2]   • Hypocalcemia [E83.51]   • Elevated troponin [R79.89]     Core Measures & Quality Metrics:  Labs reviewed, Medications reviewed and Radiology images reviewed  Piedra catheter: " Critically Ill - Requiring Accurate Measurement of Urinary Output  Central line in place: Need for access    DVT Prophylaxis: Contraindicated - High bleeding risk  DVT prophylaxis - mechanical: SCDs  Ulcer prophylaxis: Yes        TYRESE Score  Discussed patient condition with RN.  CRITICAL CARE TIME EXCLUDING PROCEDURES: 20   minutes

## 2017-03-03 NOTE — PROGRESS NOTES
Pulmonary Critical Care Progress Note        Chief Complaint: shock, gib    History of Present Illness: 66 y.o. female admitted through ED with UGIB; found to have bleeding DU; massive transfusion successful GDA embolization by IR;     ROS:   unable to perform due to the patient's inability to effectively communicate    Interval Events:  24 hour interval history reviewed    - PPI, octreotide   - vent day 3; 8, 30%   - CXR resolved RUL atx   - brittney SBT   - c3 day 3; BAL pending   - start lasix,    - remove groin art line and SC cordis    PFSH:  No change.    Respiratory:  Camara Vent Mode: Spont, Rate (breaths/min): 24, Vt Target (mL): 380, PEEP/CPAP: 8, FiO2: 30, P Support: 5, Static Compliance (ml / cm H2O): 46.6, Weaning Trial Stopped due to:: Apnea > 30 seconds X 4, Length of Weaning Trial (Hours): 0, Control VTE (exp VT): 386  Pulse Oximetry: 97 %  Chest Tube Drains:          Exam: rhonchi bibasilar and diminished breath sounds moderate  ImagingAvailable data reviewed   Recent Labs      03/01/17   2359  03/02/17   0510  03/03/17   0505   ISTATAPH  7.398*  7.361*  7.403   ISTATAPCO2  26.6  29.3  22.3*   ISTATAPO2  114*  156*  76   ISTATATCO2  17*  17*  15*   [id number]  99  99  96   ISTATARTHCO3  16.4*  16.6*  13.9*   ISTATARTBE  -7*  -8*  -10*   ISTATTEMP  37.2 C  37.3 C  37.0 C   ISTATFIO2  100  90  30   ISTATSPEC  Arterial  Arterial  Arterial   ISTATAPHTC  7.395*  7.357*  7.403   [id number]  115*  158*  76       HemoDynamics:  Pulse: 100, Heart Rate (Monitored): (!) 54  Arterial BP: 139/69 mmHg, NIBP: 132/71 mmHg  CVP (mm Hg): (!) 11 MM HG    Exam: regular rate and rhythm  Imaging: Available data reviewed  Recent Labs      03/01/17   0620   03/02/17   1540  03/02/17   2349  03/03/17   0504   TROPONINI  0.25*   < >  0.23*  0.22*  0.18*   BNPBTYPENAT  116*   --    --    --    --     < > = values in this interval not displayed.       Neuro:  GCS Total Gigi Coma Score: 11       Exam: no focal deficits noted  follows commands, raises two fingers  Imaging: Available data reviewed    Fluids:  Intake/Output       03/01/17 0700 - 03/02/17 0659 03/02/17 0700 - 03/03/17 0659 03/03/17 0700 - 03/04/17 0659      [id number] 9771-9066 Total 0700-1859 1900-0659 Total 0700-1859 1900-0659 Total       Intake    I.V.  752.3  2105.5 2857.8  1717.6  1227.6 2945.2  --  -- --    Magnesium Sulfate Volume -- 100 100 50 -- 50 -- -- --    Precedex Volume -- -- -- -- 53.2 53.2 -- -- --    Phenylephrine Volume 346.3 0 346.3 -- -- -- -- -- --    Propofol Volume -- 191.8 191.8 61.2 94.4 155.6 -- -- --    Norepinephrine Volume 406 18.8 424.8 6.4 -- 6.4 -- -- --    IV Volume (TKO) -- -- -- 80 60 140 -- -- --    IV Volume (NS) -- 1375 1375  -- -- --    IV Volume (octreotide) -- 120 120 120 120 240 -- -- --    IV Volume (protonix) -- 300 300 300 300 600 -- -- --    IV Piggyback Volume (IV Piggyback) -- -- -- 200 -- 200 -- -- --    Blood  980  1730 2710  --  -- --  --  -- --    Volume (RELEASE RED BLOOD CELLS-ACTIVE BLEEDING) 280 -- 280 -- -- -- -- -- --    Volume (RELEASE FRESH FROZEN PLASMA) 350 -- 350 -- -- -- -- -- --    Volume (RELEASE FRESH FROZEN PLASMA) -- 330 330 -- -- -- -- -- --    Volume (RELEASE RED BLOOD CELLS-ACTIVE BLEEDING) -- 350 350 -- -- -- -- -- --    Volume (RELEASE RED BLOOD CELLS-ACTIVE BLEEDING) -- 350 350 -- -- -- -- -- --    Volume (RELEASE RED BLOOD CELLS-ACTIVE BLEEDING) -- 350 350 -- -- -- -- -- --    Volume (RELEASE RED BLOOD CELLS-ACTIVE BLEEDING) -- 350 350 -- -- -- -- -- --    Volume (RELEASE RED BLOOD CELLS-ACTIVE BLEEDING) 350 -- 350 -- -- -- -- -- --    Enteral  --  240 240  90  -- 90  --  -- --    Free Water / Tube Flush -- 240 240 90 -- 90 -- -- --    Total Intake 1732.3 4075.5 5807.8 1807.6 1227.6 3035.2 -- -- --       Output    Urine  350  275 625  800  1105 1905  --  -- --    Indwelling Cathether 350 275  1905 -- -- --    Drains  --  950 950  75  -- 75  --  -- --    Nasogastric Tube -- 950  950 75 -- 75 -- -- --    Total Output 350 1225 4415 459 5935 1980 -- -- --       Net I/O     1382.3 2850.5 4232.8 932.6 122.6 1055.2 -- -- --           Recent Labs      17   0005  17   0535  17   0504   SODIUM  148*  146*  146*  147*   POTASSIUM  3.4*  3.7  3.3*  3.4*   CHLORIDE  119*  122*  122*  123*   CO2  18*  17*  17*  15*   BUN  61*  59*  60*  68*   CREATININE  1.35  1.34  1.37  1.53*   MAGNESIUM  1.0*   --   1.9  1.8   PHOSPHORUS  5.6*   --   2.9  2.6   CALCIUM  7.0*  7.7*  7.5*  7.7*       GI/Nutrition:  Exam: abdomen is soft and non-tender  Imaging: Available data reviewed  NPO  Liver Function  Recent Labs      17   0005  17   0535  17   0504   ALTSGPT  13   --    --    --    --    ASTSGOT  23   --    --    --    --    ALKPHOSPHAT  76   --    --    --    --    TBILIRUBIN  0.5   --    --    --    --    GLUCOSE  197*   < >  248*  96  109*    < > = values in this interval not displayed.       Heme:  Recent Labs      17   1800  17   2349  17   0500   RBC  1.82*  1.22*   < >  4.12*  3.84*  3.87*   HEMOGLOBIN  6.4*  4.3*   < >  12.3  11.4*  11.6*   HEMATOCRIT  20.4*  13.8*   < >  35.6*  33.1*  33.4*   PLATELETCT  148*  100*   < >  98*  92*  85*   PROTHROMBTM  18.3*   --    --    --    --    APTT  23.5*   --    --    --    --    INR  1.47*   --    --    --    --     < > = values in this interval not displayed.       Infectious Disease:  Monitored Temp  Av.6 °C (99.7 °F)  Min: 37.2 °C (99 °F)  Max: 38.2 °C (100.8 °F)  Micro: reviewed  Recent Labs      17   0620   17   0535   17   1800  17   2349  17   0500   WBC  8.4  5.8   < >  5.0   < >  6.0   < >  9.0  8.1  8.0   NEUTSPOLYS  68.90   < >  84.40*   --   70.20   --    --    --   68.90   LYMPHOCYTES  25.70   < >  13.00*   --   20.50*   --    --    --   20.00*   MONOCYTES  4.50   < >  0.00   --   7.30    --    --    --   8.10   EOSINOPHILS  0.00   < >  0.00   --   0.80   --    --    --   2.10   BASOPHILS  0.00   < >  0.00   --   0.70   --    --    --   0.50   ASTSGOT  23   --    --    --    --    --    --    --    --    ALTSGPT  13   --    --    --    --    --    --    --    --    ALKPHOSPHAT  76   --    --    --    --    --    --    --    --    TBILIRUBIN  0.5   --    --    --    --    --    --    --    --     < > = values in this interval not displayed.     Current Facility-Administered Medications   Medication Dose Frequency Provider Last Rate Last Dose   • 0.45% NaCl with KCl 20 mEq infusion   Continuous Martin Kerr M.D. 50 mL/hr at 03/03/17 0452     • pneumococcal vaccine (PNEUMOVAX-23) injection 25 mcg  0.5 mL Once PRN George Walton M.D.       • dexmedetomidine (PRECEDEX) 200 mcg in NS 50 mL infusion  0.1-1.5 mcg/kg/hr Continuous George Walton M.D. 3.1 mL/hr at 03/03/17 0815 0.2 mcg/kg/hr at 03/03/17 0815   • norepinephrine (LEVOPHED) 8 mg in  mL Infusion  0.5-30 mcg/min Continuous Elma Fitch D.O.   Stopped at 03/02/17 0730   • senna-docusate (PERICOLACE or SENOKOT S) 8.6-50 MG per tablet 2 Tab  2 Tab BID Elma Fitch D.O.   Stopped at 03/01/17 1000    And   • polyethylene glycol/lytes (MIRALAX) PACKET 1 Packet  1 Packet QDAY PRN Elma Fitch D.O.        And   • magnesium hydroxide (MILK OF MAGNESIA) suspension 30 mL  30 mL QDAY PRN Elma Fitch D.O.        And   • bisacodyl (DULCOLAX) suppository 10 mg  10 mg QDAY PRN SANDI DuranO.       • acetaminophen (TYLENOL) tablet 650 mg  650 mg Q6HRS PRN Elma Fitch D.O.       • cefTRIAXone (ROCEPHIN) 2 g in  mL IVPB  2 g DAILY Elma Fitch D.O. 200 mL/hr at 03/03/17 0816 2 g at 03/03/17 0816   • ondansetron (ZOFRAN) syringe/vial injection 4 mg  4 mg Q4HRS PRN Elma Fitch D.O.       • ondansetron (ZOFRAN ODT) dispertab 4 mg  4 mg Q4HRS PRN MOOSE Duran.OLGA.       • pantoprazole (PROTONIX) 80 mg in  mL Infusion  8 mg/hr  Continuous Elma Fitch, D.O. 25 mL/hr at 03/03/17 0700 8 mg/hr at 03/03/17 0700   • octreotide (SANDOSTATIN) 1,250 mcg in  mL Infusion  50 mcg/hr Continuous Elma Fitch D.O. 10 mL/hr at 03/03/17 0700 50 mcg/hr at 03/03/17 0700   • folic acid 1 mg in NS 50 mL IVPB  1 mg Q24HRS Elma Fitch D.O. 100 mL/hr at 03/03/17 0816 1 mg at 03/03/17 0816   • phenylephrine (RISHI-SYNEPHRINE) 40,000 mcg in  mL Infusion  1-300 mcg/min Continuous Elma Fitch D.O.   Stopped at 03/01/17 1755   • propofol (DIPRIVAN) injection  5-80 mcg/kg/min Continuous Martin Kerr M.D.   Stopped at 03/03/17 0700   • epinephrine (ADRENALIN) injection   Intra-Op Once PRN Bishop Maharaj M.D.   1 mg at 03/01/17 1655   • Respiratory Care per Protocol   Continuous RT George Walton M.D.       • lactulose 20 GM/30ML solution 30 mL  30 mL Q24HRS PRN George Walton M.D.       • chlorhexidine (PERIDEX) 0.12 % solution 15 mL  15 mL BID George Walton M.D.   15 mL at 03/03/17 0816   • lidocaine (XYLOCAINE) 1%  injection  1-2 mL Q30 MIN PRN George Walton M.D.       • fentaNYL (SUBLIMAZE) injection 25 mcg  25 mcg Q HOUR PRN George Walton M.D.   25 mcg at 03/02/17 0807    Or   • fentaNYL (SUBLIMAZE) injection 50 mcg  50 mcg Q HOUR PRN George Walton M.D.   50 mcg at 03/02/17 0936    Or   • fentaNYL (SUBLIMAZE) injection 100 mcg  100 mcg Q HOUR PRN George Walton M.D.   100 mcg at 03/01/17 2141   • insulin lispro (HUMALOG) injection 2-9 Units  2-9 Units Q6HRS George Walton M.D.   Stopped at 03/02/17 0000     Last reviewed on 3/2/2017 11:46 AM by Wendy Montelongo-Earlene RVICK    Quality  Measures:  Core Measures & Quality Metrics    Problems:  Acute hypoxic resp failure    - intubated 3/1, due to severe hemorrhagic shock   - RUL atx resolved after bronch   - starting lasix   - will extubate today  Bleeding DU with severe hemorrhagic shock   - massive transfusion resuscitation 3/1   - EGD ? Ulcer vs tumor   - successful GDA  embolization 3/1   - will need f/u CT pancrease protocol   - will need f/u EGD   - PPI, doubt need octreotide  STEFAN   - improved today   - avoid nephrotoxins    - hold off lasix  Daily EtOH use   - ? etoh liver dz  + tob    Discussed patient condition and risk of morbidity and/or mortality with RN, RT and QA team.    The patient remains critically ill.  Critical care time = 34 minutes in directly providing and coordinating critical care and extensive data review.  No time overlap and excludes procedures.

## 2017-03-03 NOTE — PROGRESS NOTES
"Hospital Medicine Progress Note, Adult, Complex               Author: Martin Kerr Date & Time created: 3/3/2017  9:10 AM     Interval History:  Pt seen and evaluated in the ICU. Ms. Balbuena has a hx of htn and hypothyroidism that presented to the ER on 3/1 with maroon-colored stools with a lactic of 14 and Cr of 2.7. She developed hypotension and a severe GI bleed and went into shock. She was intubated and had 3 \"Red Boxes\". EGD showed a mass in the duodenum with an arterial bleed. She underwent arterial coiling by Dr. Shane for further hemostasis.   Urine 800 ml last night. CVP 9. Tmax 38.3.  I discussed with Dr. Walton, critical care, this morning on [place]. NG came out last night and has remained out. She was extubated today. Her significant other is at bedside and we discussed her condition.   Review of Systems:  Review of Systems   Unable to perform ROS: intubated   Constitutional: Negative for fever and chills.   Respiratory: Positive for cough. Negative for sputum production and shortness of breath.    Gastrointestinal: Positive for abdominal pain. Negative for vomiting.        +thirsty   Psychiatric/Behavioral: Positive for memory loss.       Physical Exam:  Physical Exam   Constitutional: She is oriented to person, place, and time. No distress.   Eyes:   Scleral edema   Neck: Neck supple.   Right IJ central line   Cardiovascular: Normal rate and regular rhythm.    Pulmonary/Chest:   Normal work of breathing, good air movement bilat with a few crackles.    Abdominal: Soft. She exhibits no distension.   Musculoskeletal: She exhibits edema. She exhibits no tenderness.   Neurological: She is alert and oriented to person, place, and time.   Skin: Skin is dry. There is pallor.       Labs:  Recent Labs      03/01/17   2359  03/02/17   0510  03/03/17   0505   ISTATAPH  7.398*  7.361*  7.403   ISTATAPCO2  26.6  29.3  22.3*   ISTATAPO2  114*  156*  76   ISTATATCO2  17*  17*  15*   [id number]  99  99  96 "   ISTATARTHCO3  16.4*  16.6*  13.9*   ISTATARTBE  -7*  -8*  -10*   ISTATTEMP  37.2 C  37.3 C  37.0 C   ISTATFIO2  100  90  30   ISTATSPEC  Arterial  Arterial  Arterial   ISTATAPHTC  7.395*  7.357*  7.403   [id number]  115*  158*  76     Recent Labs      03/01/17   0620   03/02/17   1540  03/02/17   2349  03/03/17   0504   TROPONINI  0.25*   < >  0.23*  0.22*  0.18*   BNPBTYPENAT  116*   --    --    --    --     < > = values in this interval not displayed.     Recent Labs      03/01/17 2021 03/02/17   0005  03/02/17   0535  03/03/17   0504   SODIUM  148*  146*  146*  147*   POTASSIUM  3.4*  3.7  3.3*  3.4*   CHLORIDE  119*  122*  122*  123*   CO2  18*  17*  17*  15*   BUN  61*  59*  60*  68*   CREATININE  1.35  1.34  1.37  1.53*   MAGNESIUM  1.0*   --   1.9  1.8   PHOSPHORUS  5.6*   --   2.9  2.6   CALCIUM  7.0*  7.7*  7.5*  7.7*     Recent Labs      03/01/17 0620 03/02/17   0005  03/02/17   0535  03/03/17   0504   ALTSGPT  13   --    --    --    --    ASTSGOT  23   --    --    --    --    ALKPHOSPHAT  76   --    --    --    --    TBILIRUBIN  0.5   --    --    --    --    GLUCOSE  197*   < >  248*  96  109*    < > = values in this interval not displayed.     Recent Labs      03/01/17 0620 03/02/17   1800  03/02/17   2349  03/03/17   0500   RBC  1.82*  1.22*   < >  4.12*  3.84*  3.87*   HEMOGLOBIN  6.4*  4.3*   < >  12.3  11.4*  11.6*   HEMATOCRIT  20.4*  13.8*   < >  35.6*  33.1*  33.4*   PLATELETCT  148*  100*   < >  98*  92*  85*   PROTHROMBTM  18.3*   --    --    --    --    APTT  23.5*   --    --    --    --    INR  1.47*   --    --    --    --     < > = values in this interval not displayed.     Recent Labs      03/01/17   0620   03/01/17 2021 03/02/17   0535   03/02/17   1800  03/02/17   2349  03/03/17   0500   WBC  8.4  5.8   < >  5.0   < >  6.0   < >  9.0  8.1  8.0   NEUTSPOLYS  68.90   < >  84.40*   --   70.20   --    --    --   68.90   LYMPHOCYTES  25.70   < >  13.00*   --   20.50*   --     --    --   20.00*   MONOCYTES  4.50   < >  0.00   --   7.30   --    --    --   8.10   EOSINOPHILS  0.00   < >  0.00   --   0.80   --    --    --   2.10   BASOPHILS  0.00   < >  0.00   --   0.70   --    --    --   0.50   ASTSGOT  23   --    --    --    --    --    --    --    --    ALTSGPT  13   --    --    --    --    --    --    --    --    ALKPHOSPHAT  76   --    --    --    --    --    --    --    --    TBILIRUBIN  0.5   --    --    --    --    --    --    --    --     < > = values in this interval not displayed.           Hemodynamics:  No data recorded.  Monitored Temp: 38.2 °C (100.8 °F)  Pulse  Av.7  Min: 41  Max: 142Heart Rate (Monitored): (!) 54  Arterial BP: 139/69 mmHg, NIBP: 132/71 mmHg  CVP (mm Hg): (!) 11 MM HG  Respiratory:  Camara Vent Mode: Spont, Rate (breaths/min): 24, PEEP/CPAP: 8, FiO2: 30, P Peak (PIP): 18, P MEAN: 12 Respiration: (!) 25, Pulse Oximetry: 97 %     Work Of Breathing / Effort: Vented  RUL Breath Sounds: Diminished, RML Breath Sounds: Diminished, RLL Breath Sounds: Diminished, YANICK Breath Sounds: Diminished, LLL Breath Sounds: Diminished  Fluids:    Intake/Output Summary (Last 24 hours) at 17 0910  Last data filed at 17 0600   Gross per 24 hour   Intake 2550.9 ml   Output   1880 ml   Net  670.9 ml        GI/Nutrition:  Orders Placed This Encounter   Procedures   • Diet NPO     Standing Status: Standing      Number of Occurrences: 1      Standing Expiration Date:      Order Specific Question:  Type:     Answer:  Now [1]     Order Specific Question:  Restrict to:     Answer:  Strict [1]     Medical Decision Making, by Problem:  Active Hospital Problems    Diagnosis   • Shock circulatory (CMS-[facility]) [R57.9]from acute, severe, blood loss requiring pressors, fluids, and blood products. Levophed was given.   • Acute respiratory failure with hypoxia (CMS-[facility]) [J96.01]vented on 3/1, bronch done 3/2. Liberated from the vent 3/3   • ARF (acute renal failure) (CMS-[facility])  [N17.9]Cr on admit was 2.7 now 1.37   • GI bleed [K92.2]mass in the duodenum, Dr. Whiteside, GI following, Dr. Celestin, surgery. EGD on Monday. She will need a CT pancreas when stable. Protonix and octreotide.    • Anemia due to blood loss, acute [D62]   • HTN (hypertension) [I10]   • Hypothyroid [E03.9]   • Lactic acidosis [E87.2]initiallly 14   • Hypocalcemia [E83.51]IV replacement   • Elevated troponin [R79.89]troponin leakage from shock     Hypomagnesemia: IV replacement  Hypokalemia: IV replacement.  Start lasix. Stop fluids.  Cordis and arterial line out.  Labs reviewed, Radiology images reviewed and Medications reviewed  Piedra catheter: Critically Ill - Requiring Accurate Measurement of Urinary Output  Central line in place: Shock    DVT Prophylaxis: Contraindicated - High bleeding risk  DVT prophylaxis - mechanical: SCDs

## 2017-03-03 NOTE — PROCEDURES
DATE OF SERVICE:  03/02/2017    NAME OF PROCEDURE:  Diagnostic and therapeutic bronchoscopy.    INDICATION:  Respiratory failure, right upper lobe atelectasis.    DESCRIPTION OF PROCEDURE:  This patient was intubated on full ventilator   support after massive volume resuscitation for a GI bleed.  Chest x-ray today   shows complete right upper lobe atelectasis.  Bronchoscopy was indicated as   above.    A flexible fiberoptic bronchoscope was inserted through the endotracheal tube   without difficulty.  The endotracheal tube was withdrawn approximately 1 cm as   the tip of the tube was right at or near the song.  All airways were then   examined to the subsegmental level.  Airway mucosa was inflamed and edematous.    I did not appreciate any endobronchial lesions.  The right upper lobe was   lavaged with saline.  I then performed bronchoalveolar lavage from the right   upper lobe yielding moderate, slightly purulent thick secretions.  I suspect   that there was some transient mucus plugging and possible endotracheal tube   positioning, which caused this atelectasis.       ____________________________________     MD KENAN STANLEY / MAN    DD:  03/02/2017 12:12:27  DT:  03/03/2017 06:17:39    D#:  [id number]  Job#:  [id number]

## 2017-03-04 ENCOUNTER — APPOINTMENT (OUTPATIENT)
Dept: RADIOLOGY | Facility: MEDICAL CENTER | Age: 67
DRG: 356 | End: 2017-03-04
Attending: INTERNAL MEDICINE
Payer: COMMERCIAL

## 2017-03-04 PROBLEM — I95.9 HYPOTENSION: Status: ACTIVE | Noted: 2017-03-04

## 2017-03-04 PROBLEM — D64.9 SYMPTOMATIC ANEMIA: Status: ACTIVE | Noted: 2017-03-04

## 2017-03-04 LAB
ANION GAP SERPL CALC-SCNC: 11 MMOL/L (ref 0–11.9)
BACTERIA SPEC RESP CULT: NORMAL
BACTERIA SPEC RESP CULT: NORMAL
BASOPHILS # BLD AUTO: 0.4 % (ref 0–1.8)
BASOPHILS # BLD: 0.03 K/UL (ref 0–0.12)
BUN SERPL-MCNC: 56 MG/DL (ref 8–22)
CALCIUM SERPL-MCNC: 8.3 MG/DL (ref 8.5–10.5)
CHLORIDE SERPL-SCNC: 120 MMOL/L (ref 96–112)
CO2 SERPL-SCNC: 17 MMOL/L (ref 20–33)
CREAT SERPL-MCNC: 1.4 MG/DL (ref 0.5–1.4)
EOSINOPHIL # BLD AUTO: 0.19 K/UL (ref 0–0.51)
EOSINOPHIL NFR BLD: 2.7 % (ref 0–6.9)
ERYTHROCYTE [DISTWIDTH] IN BLOOD BY AUTOMATED COUNT: 51.5 FL (ref 35.9–50)
GFR SERPL CREATININE-BSD FRML MDRD: 37 ML/MIN/1.73 M 2
GLUCOSE SERPL-MCNC: 115 MG/DL (ref 65–99)
GRAM STN SPEC: NORMAL
GRAM STN SPEC: NORMAL
HCT VFR BLD AUTO: 34.3 % (ref 37–47)
HGB BLD-MCNC: 11.3 G/DL (ref 12–16)
IMM GRANULOCYTES # BLD AUTO: 0.04 K/UL (ref 0–0.11)
IMM GRANULOCYTES NFR BLD AUTO: 0.6 % (ref 0–0.9)
LYMPHOCYTES # BLD AUTO: 1.37 K/UL (ref 1–4.8)
LYMPHOCYTES NFR BLD: 19.2 % (ref 22–41)
MCH RBC QN AUTO: 29.9 PG (ref 27–33)
MCHC RBC AUTO-ENTMCNC: 32.9 G/DL (ref 33.6–35)
MCV RBC AUTO: 90.7 FL (ref 81.4–97.8)
MONOCYTES # BLD AUTO: 0.47 K/UL (ref 0–0.85)
MONOCYTES NFR BLD AUTO: 6.6 % (ref 0–13.4)
NEUTROPHILS # BLD AUTO: 5.04 K/UL (ref 2–7.15)
NEUTROPHILS NFR BLD: 70.5 % (ref 44–72)
NRBC # BLD AUTO: 0 K/UL
NRBC BLD AUTO-RTO: 0 /100 WBC
PLATELET # BLD AUTO: 96 K/UL (ref 164–446)
PMV BLD AUTO: 11 FL (ref 9–12.9)
POTASSIUM SERPL-SCNC: 3.9 MMOL/L (ref 3.6–5.5)
RBC # BLD AUTO: 3.78 M/UL (ref 4.2–5.4)
SIGNIFICANT IND 70042: NORMAL
SIGNIFICANT IND 70042: NORMAL
SITE SITE: NORMAL
SITE SITE: NORMAL
SODIUM SERPL-SCNC: 148 MMOL/L (ref 135–145)
SOURCE SOURCE: NORMAL
SOURCE SOURCE: NORMAL
WBC # BLD AUTO: 7.1 K/UL (ref 4.8–10.8)

## 2017-03-04 PROCEDURE — 700105 HCHG RX REV CODE 258

## 2017-03-04 PROCEDURE — C9113 INJ PANTOPRAZOLE SODIUM, VIA: HCPCS | Performed by: INTERNAL MEDICINE

## 2017-03-04 PROCEDURE — 700102 HCHG RX REV CODE 250 W/ 637 OVERRIDE(OP): Performed by: INTERNAL MEDICINE

## 2017-03-04 PROCEDURE — 700105 HCHG RX REV CODE 258: Performed by: INTERNAL MEDICINE

## 2017-03-04 PROCEDURE — A9270 NON-COVERED ITEM OR SERVICE: HCPCS | Performed by: INTERNAL MEDICINE

## 2017-03-04 PROCEDURE — 700101 HCHG RX REV CODE 250: Performed by: INTERNAL MEDICINE

## 2017-03-04 PROCEDURE — 770006 HCHG ROOM/CARE - MED/SURG/GYN SEMI*

## 2017-03-04 PROCEDURE — 700111 HCHG RX REV CODE 636 W/ 250 OVERRIDE (IP): Performed by: INTERNAL MEDICINE

## 2017-03-04 PROCEDURE — 80048 BASIC METABOLIC PNL TOTAL CA: CPT

## 2017-03-04 PROCEDURE — 99233 SBSQ HOSP IP/OBS HIGH 50: CPT | Performed by: INTERNAL MEDICINE

## 2017-03-04 PROCEDURE — 71010 DX-CHEST-PORTABLE (1 VIEW): CPT

## 2017-03-04 PROCEDURE — 99233 SBSQ HOSP IP/OBS HIGH 50: CPT | Performed by: HOSPITALIST

## 2017-03-04 PROCEDURE — 85025 COMPLETE CBC W/AUTO DIFF WBC: CPT

## 2017-03-04 RX ORDER — SODIUM CHLORIDE 9 MG/ML
INJECTION, SOLUTION INTRAVENOUS
Status: COMPLETED
Start: 2017-03-04 | End: 2017-03-04

## 2017-03-04 RX ORDER — POTASSIUM CHLORIDE 20 MEQ/1
20 TABLET, EXTENDED RELEASE ORAL 2 TIMES DAILY
Status: DISCONTINUED | OUTPATIENT
Start: 2017-03-04 | End: 2017-03-04

## 2017-03-04 RX ORDER — FOLIC ACID 1 MG/1
1 TABLET ORAL DAILY
Status: DISCONTINUED | OUTPATIENT
Start: 2017-03-05 | End: 2017-03-07 | Stop reason: HOSPADM

## 2017-03-04 RX ORDER — LABETALOL HYDROCHLORIDE 5 MG/ML
10 INJECTION, SOLUTION INTRAVENOUS EVERY 6 HOURS PRN
Status: DISCONTINUED | OUTPATIENT
Start: 2017-03-04 | End: 2017-03-07 | Stop reason: HOSPADM

## 2017-03-04 RX ADMIN — SODIUM CHLORIDE 8 MG/HR: 9 INJECTION, SOLUTION INTRAVENOUS at 01:53

## 2017-03-04 RX ADMIN — ACETAMINOPHEN 650 MG: 325 TABLET, FILM COATED ORAL at 01:53

## 2017-03-04 RX ADMIN — SODIUM CHLORIDE 8 MG/HR: 9 INJECTION, SOLUTION INTRAVENOUS at 23:10

## 2017-03-04 RX ADMIN — FUROSEMIDE 20 MG: 10 INJECTION, SOLUTION INTRAVENOUS at 08:14

## 2017-03-04 RX ADMIN — LABETALOL HYDROCHLORIDE 10 MG: 5 INJECTION, SOLUTION INTRAVENOUS at 20:52

## 2017-03-04 RX ADMIN — ACETAMINOPHEN 650 MG: 325 TABLET, FILM COATED ORAL at 19:34

## 2017-03-04 RX ADMIN — CEFTRIAXONE 2 G: 2 INJECTION, POWDER, FOR SOLUTION INTRAMUSCULAR; INTRAVENOUS at 08:13

## 2017-03-04 RX ADMIN — POTASSIUM CHLORIDE 20 MEQ: 14.9 INJECTION, SOLUTION INTRAVENOUS at 08:13

## 2017-03-04 RX ADMIN — ACETAMINOPHEN 650 MG: 325 TABLET, FILM COATED ORAL at 08:13

## 2017-03-04 RX ADMIN — FOLIC ACID 1 MG: 5 INJECTION, SOLUTION INTRAMUSCULAR; INTRAVENOUS; SUBCUTANEOUS at 08:39

## 2017-03-04 RX ADMIN — SODIUM CHLORIDE 500 ML: 9 INJECTION, SOLUTION INTRAVENOUS at 09:50

## 2017-03-04 ASSESSMENT — PAIN SCALES - GENERAL
PAINLEVEL_OUTOF10: 7
PAINLEVEL_OUTOF10: 0
PAINLEVEL_OUTOF10: 2
PAINLEVEL_OUTOF10: 0
PAINLEVEL_OUTOF10: 0
PAINLEVEL_OUTOF10: 3

## 2017-03-04 ASSESSMENT — ENCOUNTER SYMPTOMS
ABDOMINAL PAIN: 0
SHORTNESS OF BREATH: 0
VOMITING: 0
COUGH: 1
FEVER: 0
ROS GI COMMENTS: TOLERATING CLEARS

## 2017-03-04 NOTE — CARE PLAN
Problem: Bowel/Gastric:  Goal: Normal bowel function is maintained or improved  Multiple loose, black bowel movements overnight.    Problem: Skin Integrity  Goal: Risk for impaired skin integrity will decrease  Cleaned multiple times due to bowel movements. Barrier cream applied.

## 2017-03-04 NOTE — PROGRESS NOTES
"Hospital Medicine Progress Note, Adult, Complex               Author: Martin Kerr Date & Time created: 3/4/2017  9:07 AM     Interval History:  Pt seen and evaluated in the ICU. Ms. Balbuena has a hx of htn and hypothyroidism that presented to the ER on 3/1 with maroon-colored stools with a lactic of 14 and Cr of 2.7. She developed hypotension and a severe GI bleed and went into shock. She was intubated and had 3 \"Red Boxes\". EGD showed a mass in the duodenum with an arterial bleed. She underwent arterial coiling by Dr. Shane for further hemostasis.   4 black BMs last night. 3.2 liters urine last night.  I discussed with Dr. Walton, critical care, this morning on Hot Rounds. NG came out last night and has remained out. She was extubated 3/3. Hb 11. I discussed with her significant other today about her condition.   Review of Systems:  Review of Systems   Constitutional: Negative for fever.   Respiratory: Positive for cough. Negative for shortness of breath.    Gastrointestinal: Negative for vomiting and abdominal pain.        Tolerating clears   Neurological:        Generally weak   Psychiatric/Behavioral:        No recollection of the day of admit       Physical Exam:  Physical Exam   Constitutional: She is oriented to person, place, and time. No distress.   Eyes:   Scleral edema   Neck:   Right IJ central line   Cardiovascular: Normal rate and regular rhythm.    Pulmonary/Chest:   Normal work of breathing, good air movement bilaterally.    Abdominal: Soft. She exhibits distension. There is no tenderness.   Musculoskeletal: She exhibits edema. She exhibits no tenderness.   Neurological: She is alert and oriented to person, place, and time.   Skin: Skin is warm. There is pallor.   Psychiatric: She has a normal mood and affect. Her behavior is normal.       Labs:  Recent Labs      03/01/17   2359  03/02/17   0510  03/03/17   0505   ISTATAPH  7.398*  7.361*  7.403   ISTATAPCO2  26.6  29.3  22.3*   ISTATAPO2  114*  " 156*  76   ISTATATCO2  17*  17*  15*   [id number]  99  99  96   ISTATARTHCO3  16.4*  16.6*  13.9*   ISTATARTBE  -7*  -8*  -10*   ISTATTEMP  37.2 C  37.3 C  37.0 C   ISTATFIO2  100  90  30   ISTATSPEC  Arterial  Arterial  Arterial   ISTATAPHTC  7.395*  7.357*  7.403   [id number]  115*  158*  76     Recent Labs      17   1540  17   2349  17   0504   TROPONINI  0.23*  0.22*  0.18*     Recent Labs      17   0535  17   0504  17   0435   SODIUM  148*   < >  146*  147*  148*   POTASSIUM  3.4*   < >  3.3*  3.4*  3.9   CHLORIDE  119*   < >  122*  123*  120*   CO2  18*   < >  17*  15*  17*   BUN  61*   < >  60*  68*  56*   CREATININE  1.35   < >  1.37  1.53*  1.40   MAGNESIUM  1.0*   --   1.9  1.8   --    PHOSPHORUS  5.6*   --   2.9  2.6   --    CALCIUM  7.0*   < >  7.5*  7.7*  8.3*    < > = values in this interval not displayed.     Recent Labs      17   0504  17   0435   GLUCOSE  96  109*  115*     Recent Labs      17   0500  17   0435   RBC  3.84*  3.87*  3.78*   HEMOGLOBIN  11.4*  11.6*  11.3*   HEMATOCRIT  33.1*  33.4*  34.3*   PLATELETCT  92*  85*  96*     Recent Labs      17   0500  17   0435   WBC  6.0   < >  8.1  8.0  7.1   NEUTSPOLYS  70.20   --    --   68.90  70.50   LYMPHOCYTES  20.50*   --    --   20.00*  19.20*   MONOCYTES  7.30   --    --   8.10  6.60   EOSINOPHILS  0.80   --    --   2.10  2.70   BASOPHILS  0.70   --    --   0.50  0.40    < > = values in this interval not displayed.           Hemodynamics:  Temp (24hrs), Av °C (84.2 °F), Min:3.3 °C (37.9 °F), Max:38 °C (100.4 °F)  Temperature: 37.3 °C (99.1 °F), Monitored Temp: 37.2 °C (99 °F)  Pulse  Av.2  Min: 41  Max: 142Heart Rate (Monitored): 96  Arterial BP: (!) 162/83 mmHg, NIBP: (!) 163/90 mmHg  CVP (mm Hg): (!) 14 MM HG  Respiratory:    Respiration: 20, Pulse Oximetry: 94 %, O2 Daily  Delivery Respiratory : Room Air with O2 Available        RUL Breath Sounds: Clear, RML Breath Sounds: Clear, RLL Breath Sounds: Diminished, YANICK Breath Sounds: Clear, LLL Breath Sounds: Diminished  Fluids:    Intake/Output Summary (Last 24 hours) at 03/04/17 0907  Last data filed at 03/04/17 0900   Gross per 24 hour   Intake 2058.88 ml   Output   7175 ml   Net -5116.12 ml     Weight: 77.1 kg (169 lb 15.6 oz)  GI/Nutrition:  Orders Placed This Encounter   Procedures   • DIET ORDER     Standing Status: Standing      Number of Occurrences: 1      Standing Expiration Date:      Order Specific Question:  Diet:     Answer:  Clear Liquid [10]     Order Specific Question:  Consistency/Fluid modifications:     Answer:  Nectar Thick [2]     Medical Decision Making, by Problem:  Active Hospital Problems    Diagnosis   • Shock circulatory (CMS-[facility]) [R57.9]from acute, severe, blood loss requiring pressors, fluids, and blood products. Levophed was given.   • Acute respiratory failure with hypoxia (CMS-[facility]) [J96.01]vented on 3/1, bronch done 3/2. Liberated from the vent 3/3   • ARF (acute renal failure) (CMS-[facility]) [N17.9]Cr on admit was 2.7 now 1.37   • GI bleed [K92.2]mass in the duodenum, Dr. Whiteside, GI following, Dr. Leonard surgery consulted and he has discussed with Dr. Vincent. EGD on Monday. She will need a CT pancreas when stable. Protonix drip.     • Anemia due to blood loss, acute [D62]   • HTN (hypertension) [I10]   • Hypothyroid [E03.9]   • Lactic acidosis [E87.2]initiallly 14   • Hypocalcemia [E83.51]IV replacement given   • Elevated troponin [R79.89]troponin leakage from shock     Hypomagnesemia: IV replacement given  Hypokalemia: IV replacement. given  Start lasix. Stop fluids.  Cordis and arterial line out.  Okay out of ICU.   Labs reviewed, Radiology images reviewed and Medications reviewed  Piedra catheter: Critically Ill - Requiring Accurate Measurement of Urinary Output  Central line in place: Shock    DVT  Prophylaxis: Contraindicated - High bleeding risk  DVT prophylaxis - mechanical: SCDs

## 2017-03-04 NOTE — CARE PLAN
Problem: Safety  Goal: Will remain free from falls  Outcome: PROGRESSING AS EXPECTED  Pt is high fall risk. Hourly rounding in place. Treaded socks on; appropriate sign on door placed for number of assistance needed. Personal belongings and call light within reach. Bed alarm in place.    Problem: Mobility  Goal: Risk for activity intolerance will decrease  Outcome: PROGRESSING AS EXPECTED  Pt mobilized to edge of bed with 2 person assistance. Pt is generally weak. PT/OT ordered.

## 2017-03-04 NOTE — RESPIRATORY CARE
COPD EDUCATION by COPD CLINICAL EDUCATOR  3/3/2017 at 5:53 PM by Irma Anaya     Patient reviewed by COPD education team. Patient does not qualify for COPD program.

## 2017-03-04 NOTE — CARE PLAN
Problem: Ventilation Defect:  Goal: Ability to achieve and maintain unassisted ventilation or tolerate decreased levels of ventilator support  Outcome: MET Date Met:  03/03/17  Pt extubated and is now on RA. Pt got over 60% on IS.

## 2017-03-04 NOTE — PROGRESS NOTES
Pulmonary Critical Care Progress Note        Chief Complaint: shock, gib    History of Present Illness: 66 y.o. female admitted through ED with UGIB; found to have bleeding DU; massive transfusion successful GDA embolization by IR;     ROS:   unable to perform due to the patient's inability to effectively communicate    Interval Events:  24 hour interval history reviewed    - extubated 3/3   - HH stable; now off pressors   - A/Ox4, NFE   - SR/ST   - hypertensive   - lasix with diuretic response   - CVP 5-12   - brittney clear diet; repeat EGD 1-2 days   - +BMs dark   - good UOP   - Tm 100.2   - room air; CXR LL atx   - C3 day 4 of 7   - OK to floor    PFSH:  No change.    Respiratory:     Pulse Oximetry: 94 %  Chest Tube Drains:          Exam: rhonchi bibasilar and diminished breath sounds moderate  ImagingAvailable data reviewed   Recent Labs      03/01/17   2359  03/02/17   0510  03/03/17   0505   ISTATAPH  7.398*  7.361*  7.403   ISTATAPCO2  26.6  29.3  22.3*   ISTATAPO2  114*  156*  76   ISTATATCO2  17*  17*  15*   [id number]  99  99  96   ISTATARTHCO3  16.4*  16.6*  13.9*   ISTATARTBE  -7*  -8*  -10*   ISTATTEMP  37.2 C  37.3 C  37.0 C   ISTATFIO2  100  90  30   ISTATSPEC  Arterial  Arterial  Arterial   ISTATAPHTC  7.395*  7.357*  7.403   [id number]  115*  158*  76       HemoDynamics:  Pulse: 96, Heart Rate (Monitored): 96  Arterial BP: (!) 162/83 mmHg, NIBP: (!) 163/90 mmHg  CVP (mm Hg): (!) 14 MM HG    Exam: regular rate and rhythm  Imaging: Available data reviewed  Recent Labs      03/02/17   1540  03/02/17   2349  03/03/17   0504   TROPONINI  0.23*  0.22*  0.18*       Neuro:  GCS Total [place] Coma Score: 15       Exam: no focal deficits noted follows commands, raises two fingers  Imaging: Available data reviewed    Fluids:  Intake/Output       03/02/17 0700 - 03/03/17 0659 03/03/17 0700 - 03/04/17 0659 03/04/17 0700 - 03/05/17 0659      8781-3682 1482-9056 Total 7206-8375 7635-9371 Total 0700-1859 1900-0659  Total       Intake    P.O.  --  -- --  370  240 610  120  -- 120    P.O. -- -- -- 370 240 610 120 -- 120    I.V.  1717.6  1227.6 2945.2  850.8  420 1270.8  355  -- 355    Magnesium Sulfate Volume 50 -- 50 -- -- -- -- -- --    Precedex Volume -- 53.2 53.2 27.1 -- 27.1 -- -- --    Propofol Volume 61.2 94.4 155.6 3.7 -- 3.7 -- -- --    Norepinephrine Volume 6.4 -- 6.4 -- -- -- -- -- --    IV Volume (TKO) 80 60 140 -- -- -- -- -- --    IV Volume (NS)  150 -- 150 -- -- --    IV Volume (octreotide) 120 120 240 120 120 240 30 -- 30    IV Volume (protonix) 300 300 600 300 300 600 75 -- 75    IV Piggyback Volume (IV Piggyback) 200 -- 200 250 -- 250 250 -- 250    Enteral  90  -- 90  --  -- --  --  -- --    Free Water / Tube Flush 90 -- 90 -- -- -- -- -- --    Total Intake 1807.6 1227.6 3035.2 1220.8 660 1880.8 475 -- 475       Output    Urine  800  1105 1905  3500  3250 6750  625  -- 625    Indwelling Cathether 800 1105 1905 3500 3250 6750 625 -- 625    Drains  75  -- 75  --  -- --  --  -- --    Nasogastric Tube 75 -- 75 -- -- -- -- -- --    Stool  --  -- --  --  -- --  --  -- --    Number of Times Stooled -- -- -- 1 x 4 x 5 x 1 x -- 1 x    Total Output 875 1105 1980 3500 3250 6750 625 -- 625       Net I/O     932.6 122.6 1055.2 -2279.2 -2590 -4869.2 -150 -- -150        Weight: 77.1 kg (169 lb 15.6 oz)  Recent Labs      03/01/17 2021 03/02/17   0535  03/03/17   0504  03/04/17   0435   SODIUM  148*   < >  146*  147*  148*   POTASSIUM  3.4*   < >  3.3*  3.4*  3.9   CHLORIDE  119*   < >  122*  123*  120*   CO2  18*   < >  17*  15*  17*   BUN  61*   < >  60*  68*  56*   CREATININE  1.35   < >  1.37  1.53*  1.40   MAGNESIUM  1.0*   --   1.9  1.8   --    PHOSPHORUS  5.6*   --   2.9  2.6   --    CALCIUM  7.0*   < >  7.5*  7.7*  8.3*    < > = values in this interval not displayed.       GI/Nutrition:  Exam: abdomen is soft and non-tender  Imaging: Available data reviewed  taking PO  Liver Function  Recent Labs       17   0504  17   043   GLUCOSE  96  109*  115*       Heme:  Recent Labs      17   0500  17   RBC  3.84*  3.87*  3.78*   HEMOGLOBIN  11.4*  11.6*  11.3*   HEMATOCRIT  33.1*  33.4*  34.3*   PLATELETCT  92*  85*  96*       Infectious Disease:  Temp  Av °C (84.2 °F)  Min: 3.3 °C (37.9 °F)  Max: 38 °C (100.4 °F)  Monitored Temp  Av.6 °C (99.6 °F)  Min: 36.8 °C (98.2 °F)  Max: 38 °C (100.4 °F)  Micro: reviewed  Recent Labs      17   WBC  6.0   < >  8.1  8.0  7.1   NEUTSPOLYS  70.20   --    --   68.90  70.50   LYMPHOCYTES  20.50*   --    --   20.00*  19.20*   MONOCYTES  7.30   --    --   8.10  6.60   EOSINOPHILS  0.80   --    --   2.10  2.70   BASOPHILS  0.70   --    --   0.50  0.40    < > = values in this interval not displayed.     Current Facility-Administered Medications   Medication Dose Frequency Provider Last Rate Last Dose   • SODIUM CHLORIDE 0.9 % IV SOLN           • furosemide (LASIX) injection 20 mg  20 mg Q12HRS George Walton M.D.   20 mg at 17 0814   • potassium chloride in water (KCL) ivpb **Administer in ICU only** 20 mEq  20 mEq Q12HRS George Walton M.D. 50 mL/hr at 17 0813 20 mEq at 17 0813   • pneumococcal vaccine (PNEUMOVAX-23) injection 25 mcg  0.5 mL Once PRN George Walton M.D.       • dexmedetomidine (PRECEDEX) 200 mcg in NS 50 mL infusion  0.1-1.5 mcg/kg/hr Continuous eGorge Waltno M.D.   Stopped at 17 0930   • senna-docusate (PERICOLACE or SENOKOT S) 8.6-50 MG per tablet 2 Tab  2 Tab BID Elma Fitch D.O.   Stopped at 17 1000    And   • polyethylene glycol/lytes (MIRALAX) PACKET 1 Packet  1 Packet QDAY PRN Elma Fitch D.O.        And   • magnesium hydroxide (MILK OF MAGNESIA) suspension 30 mL  30 mL QDAY PRN Elma Fitch D.O.        And   • bisacodyl (DULCOLAX) suppository 10 mg  10 mg QDAY PRN Elma Fitch D.O.       •  acetaminophen (TYLENOL) tablet 650 mg  650 mg Q6HRS PRN Elma Fitch D.O.   650 mg at 03/04/17 0813   • cefTRIAXone (ROCEPHIN) 2 g in  mL IVPB  2 g DAILY Elma Fitch D.O.   Stopped at 03/04/17 0843   • ondansetron (ZOFRAN) syringe/vial injection 4 mg  4 mg Q4HRS PRN Elma Fitch D.O.       • ondansetron (ZOFRAN ODT) dispertab 4 mg  4 mg Q4HRS PRN Elma Fitch D.O.       • pantoprazole (PROTONIX) 80 mg in  mL Infusion  8 mg/hr Continuous Elma Fitch D.O. 25 mL/hr at 03/04/17 0153 8 mg/hr at 03/04/17 0153   • octreotide (SANDOSTATIN) 1,250 mcg in  mL Infusion  50 mcg/hr Continuous Elma Fitch D.O. 10 mL/hr at 03/03/17 1900 50 mcg/hr at 03/03/17 1900   • folic acid 1 mg in NS 50 mL IVPB  1 mg Q24HRS Elma Fitch D.O. 100 mL/hr at 03/04/17 0839 1 mg at 03/04/17 0839   • epinephrine (ADRENALIN) injection   Intra-Op Once PRN Bishop Maharaj M.D.   1 mg at 03/01/17 1655   • Respiratory Care per Protocol   Continuous RT George Walton M.D.       • lactulose 20 GM/30ML solution 30 mL  30 mL Q24HRS PRN George Walton M.D.       • chlorhexidine (PERIDEX) 0.12 % solution 15 mL  15 mL BID George Walton M.D.   Stopped at 03/03/17 2100   • lidocaine (XYLOCAINE) 1%  injection  1-2 mL Q30 MIN PRN George Walton M.D.       • fentaNYL (SUBLIMAZE) injection 25 mcg  25 mcg Q HOUR PRN George Walton M.D.   25 mcg at 03/02/17 0807    Or   • fentaNYL (SUBLIMAZE) injection 50 mcg  50 mcg Q HOUR PRN George Walton M.D.   50 mcg at 03/02/17 0936    Or   • fentaNYL (SUBLIMAZE) injection 100 mcg  100 mcg Q HOUR PRN George Walton M.D.   100 mcg at 03/01/17 2141     Last reviewed on 3/2/2017 11:46 AM by Wendy Sethi R.N.    Quality  Measures:  Core Measures & Quality Metrics    Problems:  Acute hypoxic resp failure    - intubated 3/1, due to severe hemorrhagic shock   - RUL atx resolved after bronch   - extubated 3/3 - doing well on room air  Bleeding DU with severe hemorrhagic shock   -  massive transfusion resuscitation 3/1   - EGD ? Ulcer vs tumor   - successful GDA embolization 3/1   - will need f/u CT pancrease protocol   - will need f/u EGD   - PPI, doubt need octreotide  STEFAN   - improved    - avoid nephrotoxins    - hold off lasix  Daily EtOH use   - ? etoh liver dz  + tob    Discussed patient condition and risk of morbidity and/or mortality with RN, RT and QA team.    [facility] will sign off on transfer out of ICU.  Please call if needed.

## 2017-03-04 NOTE — PROGRESS NOTES
Gastroenterology Progress Note     Author: Case Sanches   Date & Time Created: 3/4/2017 12:37 PM    CC: Acute massive upper GI bleed anemia    Interval History:  H&P: She has had frequent daily use of ibuprofen recently for chronic low back pain.  She also has frequent heartburn, but denies dysphagia, last evening early morning hours around 4:00 a.m., she awoke and had passage of a large amount of bright red blood per rectum, she became lightheaded, EMS was called, per the patient this has never happened before.  She has never had EGD nor colonoscopy nor seen a gastroenterologist.  No pertinent family history of luminal GI disease or liver disease.  The patient does drink alcohol, estimating 2 glasses of whiskey per night.  Her initial labs showed hemoglobin 4.3.  She received 2 units urgently here, her hemoglobin juliet to 6.  She is receiving a third unit.  Her platelet count was low at 100,000.  Her BUN is elevated at 98 with a creatinine of 2.67.  INR was elevated at 1.47.  Troponin I was initially 0.25, the patient denies any chest pain or palpitations or shortness of breath.  She is weak and lightheaded.  She has been having some vague abdominal pain, which is a kind of a generalized.  No identified alleviating or aggravating factors, per se.  She has had loss of appetite and thinks that eating might make the pain worse.  No radiation of the pain.  She is hypotensive.  3/2/17 - EGD was attempted yesterday by Dr. Maharaj.  She had massive amount of blood obscuring duodenum.  Could not be stopped endoscopically, but then went to IR and hemostasis was achieved.  She has been relatively stable overnight without further acute overt bleeding.  On vent.  3/3/17 - No signs of acute / overt GI bleeding.  No events overnignt.  Planned for extubation today.  Hb stable without further transfusion requirements.  2/4/17 - Extubated overnight.  Awake, alert with no complaints.  No further signs of  bleeding.    Review of Systems:  Review of Systems   Unable to perform ROS: intubated       Physical Exam:  Physical Exam   Constitutional: She appears well-developed and well-nourished.   HENT:   Head: Normocephalic and atraumatic.   Eyes: Conjunctivae are normal.   Neck: Neck supple.   Cardiovascular: Normal rate and regular rhythm.    Pulmonary/Chest:   Coarse breath sounds bilaterally   Abdominal: Soft. Bowel sounds are normal.   Skin: Skin is warm and dry.   Nursing note and vitals reviewed.      Labs:  Recent Labs      03/01/17   2359  03/02/17   0510  03/03/17   0505   ISTATAPH  7.398*  7.361*  7.403   ISTATAPCO2  26.6  29.3  22.3*   ISTATAPO2  114*  156*  76   ISTATATCO2  17*  17*  15*   [id number]  99  99  96   ISTATARTHCO3  16.4*  16.6*  13.9*   ISTATARTBE  -7*  -8*  -10*   ISTATTEMP  37.2 C  37.3 C  37.0 C   ISTATFIO2  100  90  30   ISTATSPEC  Arterial  Arterial  Arterial   ISTATAPHTC  7.395*  7.357*  7.403   [id number]  115*  158*  76     Recent Labs      03/02/17   1540  03/02/17   2349  03/03/17   0504   TROPONINI  0.23*  0.22*  0.18*     Recent Labs      03/01/17   2021   03/02/17   0535  03/03/17   0504  03/04/17   0435   SODIUM  148*   < >  146*  147*  148*   POTASSIUM  3.4*   < >  3.3*  3.4*  3.9   CHLORIDE  119*   < >  122*  123*  120*   CO2  18*   < >  17*  15*  17*   BUN  61*   < >  60*  68*  56*   CREATININE  1.35   < >  1.37  1.53*  1.40   MAGNESIUM  1.0*   --   1.9  1.8   --    PHOSPHORUS  5.6*   --   2.9  2.6   --    CALCIUM  7.0*   < >  7.5*  7.7*  8.3*    < > = values in this interval not displayed.     Recent Labs      03/02/17   0535  03/03/17   0504  03/04/17   0435   GLUCOSE  96  109*  115*     Recent Labs      03/02/17   2349  03/03/17   0500  03/04/17   0435   RBC  3.84*  3.87*  3.78*   HEMOGLOBIN  11.4*  11.6*  11.3*   HEMATOCRIT  33.1*  33.4*  34.3*   PLATELETCT  92*  85*  96*     Recent Labs      03/02/17   0535   03/02/17   2349  03/03/17   0500  03/04/17   0435   WBC  6.0   <  >  8.1  8.0  7.1   NEUTSPOLYS  70.20   --    --   68.90  70.50   LYMPHOCYTES  20.50*   --    --   20.00*  19.20*   MONOCYTES  7.30   --    --   8.10  6.60   EOSINOPHILS  0.80   --    --   2.10  2.70   BASOPHILS  0.70   --    --   0.50  0.40    < > = values in this interval not displayed.     Hemodynamics:  Temp (24hrs), Av °C (84.2 °F), Min:3.3 °C (37.9 °F), Max:38 °C (100.4 °F)  Temperature: 37.3 °C (99.1 °F), Monitored Temp: 37.3 °C (99.1 °F)  Pulse  Av.1  Min: 41  Max: 142Heart Rate (Monitored): 89  NIBP: 150/93 mmHg  CVP (mm Hg): (!) 14 MM HG  Respiratory:    Respiration: 20, Pulse Oximetry: 93 %, O2 Daily Delivery Respiratory : Room Air with O2 Available     Work Of Breathing / Effort: Mild  RUL Breath Sounds: Clear, RML Breath Sounds: Clear, RLL Breath Sounds: Diminished, YANICK Breath Sounds: Clear, LLL Breath Sounds: Diminished  Fluids:    Intake/Output Summary (Last 24 hours) at 17 0848  Last data filed at 17 0600   Gross per 24 hour   Intake 5807.81 ml   Output   1575 ml   Net 4232.81 ml     Weight: 77.1 kg (169 lb 15.6 oz)  GI/Nutrition:  Orders Placed This Encounter   Procedures   • DIET ORDER     Standing Status: Standing      Number of Occurrences: 1      Standing Expiration Date:      Order Specific Question:  Diet:     Answer:  Clear Liquid [10]     Order Specific Question:  Consistency/Fluid modifications:     Answer:  Nectar Thick [2]     Medical Decision Making, by Problem:  Active Hospital Problems    Diagnosis   • Shock circulatory (CMS-[facility]) [R57.9]   • Acute respiratory failure with hypoxia (CMS-[facility]) [J96.01]   • ARF (acute renal failure) (CMS-[facility]) [N17.9]   • GI bleed [K92.2]   • Anemia due to blood loss, acute [D62]   • HTN (hypertension) [I10]   • Hypothyroid [E03.9]   • Lactic acidosis [E87.2]   • Hypocalcemia [E83.51]   • Elevated troponin [R79.89]       Impression / Plan:  1.  Acute GI bleed anemia from suspected duodenal source - unclear if ulcer vs neoplasm  2.   Hematochezia  3.  Hypertension  4.  Abdominal pain, generalized  5.  Nausea and vomiting  6.  Renal insufficiency  7.  Coagulopathy  8.  Hypertension  9.  Hypothyroidism     PLAN AND RECOMMENDATIONS:  1.  Continue ICU care  2.  Transfuse packed red blood cells as you are to keep Hb >7  3.  Serial labs including hemoglobin, hematocrit and INR  4.  Will potententially need CT with contrast once renal status improved  5.  Octreotide 50 mcg IV bolus followed by 50 mcg IV per hour  6.  Pantoprazole 80 mg IV bolus followed by 8 mg IV per hour  7.  EGD to visualize source of bleeding and obtain biopsy if indicated - likely Monday  8.  Maintain good IV access    Labs reviewed, Medications reviewed, EKG reviewed and Radiology images reviewed

## 2017-03-05 LAB
ANION GAP SERPL CALC-SCNC: 10 MMOL/L (ref 0–11.9)
BUN SERPL-MCNC: 36 MG/DL (ref 8–22)
CALCIUM SERPL-MCNC: 8.1 MG/DL (ref 8.5–10.5)
CHLORIDE SERPL-SCNC: 113 MMOL/L (ref 96–112)
CO2 SERPL-SCNC: 21 MMOL/L (ref 20–33)
CREAT SERPL-MCNC: 1.14 MG/DL (ref 0.5–1.4)
ERYTHROCYTE [DISTWIDTH] IN BLOOD BY AUTOMATED COUNT: 47.9 FL (ref 35.9–50)
GFR SERPL CREATININE-BSD FRML MDRD: 47 ML/MIN/1.73 M 2
GLUCOSE SERPL-MCNC: 96 MG/DL (ref 65–99)
HCT VFR BLD AUTO: 33.7 % (ref 37–47)
HGB BLD-MCNC: 11.3 G/DL (ref 12–16)
MCH RBC QN AUTO: 29.8 PG (ref 27–33)
MCHC RBC AUTO-ENTMCNC: 33.5 G/DL (ref 33.6–35)
MCV RBC AUTO: 88.9 FL (ref 81.4–97.8)
PLATELET # BLD AUTO: 90 K/UL (ref 164–446)
PMV BLD AUTO: 10.1 FL (ref 9–12.9)
POTASSIUM SERPL-SCNC: 3.6 MMOL/L (ref 3.6–5.5)
RBC # BLD AUTO: 3.79 M/UL (ref 4.2–5.4)
SODIUM SERPL-SCNC: 144 MMOL/L (ref 135–145)
WBC # BLD AUTO: 6.1 K/UL (ref 4.8–10.8)

## 2017-03-05 PROCEDURE — 36415 COLL VENOUS BLD VENIPUNCTURE: CPT

## 2017-03-05 PROCEDURE — A9270 NON-COVERED ITEM OR SERVICE: HCPCS | Performed by: INTERNAL MEDICINE

## 2017-03-05 PROCEDURE — 770006 HCHG ROOM/CARE - MED/SURG/GYN SEMI*

## 2017-03-05 PROCEDURE — 80048 BASIC METABOLIC PNL TOTAL CA: CPT

## 2017-03-05 PROCEDURE — 99232 SBSQ HOSP IP/OBS MODERATE 35: CPT | Performed by: INTERNAL MEDICINE

## 2017-03-05 PROCEDURE — 85027 COMPLETE CBC AUTOMATED: CPT

## 2017-03-05 PROCEDURE — 700102 HCHG RX REV CODE 250 W/ 637 OVERRIDE(OP): Performed by: HOSPITALIST

## 2017-03-05 PROCEDURE — 700102 HCHG RX REV CODE 250 W/ 637 OVERRIDE(OP): Performed by: INTERNAL MEDICINE

## 2017-03-05 PROCEDURE — 700111 HCHG RX REV CODE 636 W/ 250 OVERRIDE (IP): Performed by: INTERNAL MEDICINE

## 2017-03-05 PROCEDURE — 700105 HCHG RX REV CODE 258: Performed by: INTERNAL MEDICINE

## 2017-03-05 PROCEDURE — C9113 INJ PANTOPRAZOLE SODIUM, VIA: HCPCS | Performed by: INTERNAL MEDICINE

## 2017-03-05 PROCEDURE — A9270 NON-COVERED ITEM OR SERVICE: HCPCS | Performed by: HOSPITALIST

## 2017-03-05 RX ORDER — AMLODIPINE BESYLATE 10 MG/1
10 TABLET ORAL
Status: DISCONTINUED | OUTPATIENT
Start: 2017-03-05 | End: 2017-03-07 | Stop reason: HOSPADM

## 2017-03-05 RX ADMIN — AMLODIPINE BESYLATE 10 MG: 10 TABLET ORAL at 11:55

## 2017-03-05 RX ADMIN — ACETAMINOPHEN 650 MG: 325 TABLET, FILM COATED ORAL at 20:50

## 2017-03-05 RX ADMIN — NYSTATIN 1500000 UNITS: 100000 POWDER TOPICAL at 22:43

## 2017-03-05 RX ADMIN — FOLIC ACID 1 MG: 1 TABLET ORAL at 09:00

## 2017-03-05 RX ADMIN — SODIUM CHLORIDE 8 MG/HR: 9 INJECTION, SOLUTION INTRAVENOUS at 20:48

## 2017-03-05 RX ADMIN — STANDARDIZED SENNA CONCENTRATE AND DOCUSATE SODIUM 2 TABLET: 8.6; 5 TABLET, FILM COATED ORAL at 20:50

## 2017-03-05 RX ADMIN — SODIUM CHLORIDE 8 MG/HR: 9 INJECTION, SOLUTION INTRAVENOUS at 09:26

## 2017-03-05 ASSESSMENT — LIFESTYLE VARIABLES
TOTAL SCORE: 0
EVER HAD A DRINK FIRST THING IN THE MORNING TO STEADY YOUR NERVES TO GET RID OF A HANGOVER: NO
TOTAL SCORE: 0
HAVE PEOPLE ANNOYED YOU BY CRITICIZING YOUR DRINKING: NO
AVERAGE NUMBER OF DAYS PER WEEK YOU HAVE A DRINK CONTAINING ALCOHOL: 7
CONSUMPTION TOTAL: POSITIVE
ALCOHOL_USE: YES
EVER FELT BAD OR GUILTY ABOUT YOUR DRINKING: NO
ON A TYPICAL DAY WHEN YOU DRINK ALCOHOL HOW MANY DRINKS DO YOU HAVE: 2
TOTAL SCORE: 0
HAVE YOU EVER FELT YOU SHOULD CUT DOWN ON YOUR DRINKING: NO
HOW MANY TIMES IN THE PAST YEAR HAVE YOU HAD 5 OR MORE DRINKS IN A DAY: 0

## 2017-03-05 ASSESSMENT — PAIN SCALES - GENERAL
PAINLEVEL_OUTOF10: 4
PAINLEVEL_OUTOF10: 2
PAINLEVEL_OUTOF10: 2
PAINLEVEL_OUTOF10: 0

## 2017-03-05 ASSESSMENT — ENCOUNTER SYMPTOMS
ROS GI COMMENTS: TOLERATING CLEARS
COUGH: 1
PALPITATIONS: 0
GASTROINTESTINAL NEGATIVE: 1
VOMITING: 0
RESPIRATORY NEGATIVE: 1
FOCAL WEAKNESS: 0
ABDOMINAL PAIN: 0
SHORTNESS OF BREATH: 0
MUSCULOSKELETAL NEGATIVE: 1
CONSTITUTIONAL NEGATIVE: 1
WEAKNESS: 1
DIZZINESS: 0
PSYCHIATRIC NEGATIVE: 1
MEMORY LOSS: 1
FEVER: 0
CARDIOVASCULAR NEGATIVE: 1

## 2017-03-05 NOTE — PROGRESS NOTES
Hospital Medicine Progress Note, Adult, Complex               Author: Nilda Rawls  Date & Time created: 3/5/2017  2:37 PM     Interval History:  Pt seen and evaluated in the ICU. Ms. Balbuena has a hx of htn and hypothyroidism that presented to the ER on 3/1 with maroon-colored stools with a lactic of 14 and Cr of 2.7. She developed hemorrhagic shock and had massive RBC transfusion and required intubation. EGD showed a mass in the duodenum with an arterial bleed. She underwent arterial coiling by Dr. Shane for further hemostasis.   3/3: Extubated, still with melena  3/4: NGT discontinued, melena resolved  3/5: Melena resolved, Hgb stable.  Planning for repeat EGD in AM    Review of Systems:  Review of Systems   Constitutional: Positive for malaise/fatigue. Negative for fever.   Respiratory: Positive for cough. Negative for shortness of breath.    Cardiovascular: Negative for chest pain and palpitations.   Gastrointestinal: Negative for vomiting and abdominal pain.        Tolerating clears   Neurological: Positive for weakness. Negative for dizziness and focal weakness.   Psychiatric/Behavioral: Positive for memory loss.       Physical Exam:  Physical Exam   Constitutional: She is oriented to person, place, and time. No distress.   Eyes:   Scleral edema   Neck:   Right IJ central line   Cardiovascular: Normal rate and regular rhythm.    Pulmonary/Chest: Effort normal and breath sounds normal. No respiratory distress.   Abdominal: Soft. She exhibits distension. There is no tenderness.   Musculoskeletal: She exhibits edema. She exhibits no tenderness.   Neurological: She is alert and oriented to person, place, and time.   Skin: Skin is warm. There is pallor.   Psychiatric: She has a normal mood and affect. Her behavior is normal.       Labs:  Recent Labs      03/03/17   0505   ISTATAPH  7.403   ISTATAPCO2  22.3*   ISTATAPO2  76   ISTATATCO2  15*   [id number]  96   ISTATARTHCO3  13.9*   ISTATARTBE  -10*    ISTATTEMP  37.0 C   ISTATFIO2  30   ISTATSPEC  Arterial   ISTATAPHTC  7.403   [id number]  76     Recent Labs      17   1540  17   2349  17   0504   TROPONINI  0.23*  0.22*  0.18*     Recent Labs      17   0504  17   0435  17   0229   SODIUM  147*  148*  144   POTASSIUM  3.4*  3.9  3.6   CHLORIDE  123*  120*  113*   CO2  15*  17*  21   BUN  68*  56*  36*   CREATININE  1.53*  1.40  1.14   MAGNESIUM  1.8   --    --    PHOSPHORUS  2.6   --    --    CALCIUM  7.7*  8.3*  8.1*     Recent Labs      17   0504  17   04317   GLUCOSE  109*  115*  96     Recent Labs      17   0500  17   0435  17   022   RBC  3.87*  3.78*  3.79*   HEMOGLOBIN  11.6*  11.3*  11.3*   HEMATOCRIT  33.4*  34.3*  33.7*   PLATELETCT  85*  96*  90*     Recent Labs      17   0500  17   04317   WBC  8.0  7.1  6.1   NEUTSPOLYS  68.90  70.50   --    LYMPHOCYTES  20.00*  19.20*   --    MONOCYTES  8.10  6.60   --    EOSINOPHILS  2.10  2.70   --    BASOPHILS  0.50  0.40   --            Hemodynamics:  Temp (24hrs), Av.3 °C (97.3 °F), Min:36 °C (96.8 °F), Max:36.9 °C (98.4 °F)  Temperature: 36.4 °C (97.5 °F), Monitored Temp: 37.3 °C (99.1 °F)  Pulse  Av.3  Min: 41  Max: 142Heart Rate (Monitored): 85  Blood Pressure : (!) 161/88 mmHg (nurse notified), NIBP: (!) 176/97 mmHg    Respiratory:    Respiration: 16, Pulse Oximetry: 99 %     Work Of Breathing / Effort: Mild  RUL Breath Sounds: Clear, RML Breath Sounds: Clear, RLL Breath Sounds: Diminished, YANICK Breath Sounds: Clear, LLL Breath Sounds: Diminished  Fluids:    Intake/Output Summary (Last 24 hours) at 17 1437  Last data filed at 17 1200   Gross per 24 hour   Intake    195 ml   Output   1125 ml   Net   -930 ml        GI/Nutrition:  Orders Placed This Encounter   Procedures   • DIET ORDER     Standing Status: Standing      Number of Occurrences: 1      Standing Expiration Date:       Order Specific Question:  Diet:     Answer:  Clear Liquid [10]     Order Specific Question:  Consistency/Fluid modifications:     Answer:  Nectar Thick [2]       Medical Decision Making, by Problem:  Active Hospital Problems    Diagnosis   • Shock, Hemorrhagic (CMS-HCC) [R57.9] - from acute, severe, blood loss requiring pressors, fluids, and blood products. Levophed was given.  -BP stable     • Acute respiratory failure with hypoxia (CMS-HCC) [J96.01]vented on 3/1, bronch done 3/2. Liberated from the vent 3/3  -Continue O2 PRN     • ARF (acute renal failure) (CMS-HCC) [N17.9]Cr on admit was 2.7, improved and in normal range  -Monitor Cr Daily  -Watch UOP  -Hold nephrotoxins     • GI bleed [K92.2]mass in the duodenum, Dr. Whiteside, GI following, Dr. Leonard surgery consulted and he has discussed with Dr. Vincent.   -EGD on Monday.   -She will need a CT pancreas when stable.   -Protonix drip.       • Anemia due to blood loss, acute [D62]- s/p massive transfusion, hgb stable at 11  -CBC in AM  -Hold all anticoag     • HTN (hypertension) [I10]- BP now elevated >160  -Resume amlodipine w holding parameters     • Hypothyroid [E03.9]     • Lactic acidosis [E87.2]initiallly 14- normalized     • Hypocalcemia [E83.51]IV replacement given     • Elevated troponin [R79.89]troponin leakage from shock  -Trended down       Dispo: EGD in AM to eval duodenal mass    Labs reviewed, Radiology images reviewed and Medications reviewed  Piedra catheter: No Piedra  Central line in place: Shock    DVT Prophylaxis: Contraindicated - High bleeding risk  DVT prophylaxis - mechanical: SCDs

## 2017-03-05 NOTE — PROGRESS NOTES
"Report received by Martin PANDYA.  A&O x 4. Patient calls appropriately.  Denies numbness and tingling.   Denies N&V.  Scattered bruising.  Patient denies SOB.  SCD's in place.   \"Bill\" at bedside.  Call light and personal belongings with in reach. Hourly rounding in place. All needs met at this time.  "

## 2017-03-05 NOTE — PROGRESS NOTES
Bedside report completed. Assumed care of pt at 1915. Bed is locked and in lowest position, personal items within reach, call light in hand. Provided numbers of RN and CNA. Pt has no complaints at this time.

## 2017-03-05 NOTE — PROGRESS NOTES
Gastroenterology Progress Note     Author: Case Sanches   Date & Time Created: 3/5/2017 11:39 AM    CC: Acute massive upper GI bleed anemia    Interval History:  H&P: She has had frequent daily use of ibuprofen recently for chronic low back pain.  She also has frequent heartburn, but denies dysphagia, last evening early morning hours around 4:00 a.m., she awoke and had passage of a large amount of bright red blood per rectum, she became lightheaded, EMS was called, per the patient this has never happened before.  She has never had EGD nor colonoscopy nor seen a gastroenterologist.  No pertinent family history of luminal GI disease or liver disease.  The patient does drink alcohol, estimating 2 glasses of whiskey per night.  Her initial labs showed hemoglobin 4.3.  She received 2 units urgently here, her hemoglobin juliet to 6.  She is receiving a third unit.  Her platelet count was low at 100,000.  Her BUN is elevated at 98 with a creatinine of 2.67.  INR was elevated at 1.47.  Troponin I was initially 0.25, the patient denies any chest pain or palpitations or shortness of breath.  She is weak and lightheaded.  She has been having some vague abdominal pain, which is a kind of a generalized.  No identified alleviating or aggravating factors, per se.  She has had loss of appetite and thinks that eating might make the pain worse.  No radiation of the pain.  She is hypotensive.  3/2/17 - EGD was attempted yesterday by Dr. Maharaj.  She had massive amount of blood obscuring duodenum.  Could not be stopped endoscopically, but then went to IR and hemostasis was achieved.  She has been relatively stable overnight without further acute overt bleeding.  On vent.  3/3/17 - No signs of acute / overt GI bleeding.  No events overnignt.  Planned for extubation today.  Hb stable without further transfusion requirements.  3/4/17 - Extubated overnight.  Awake, alert with no complaints.  No further signs of bleeding.  3/5/17  - No events overnight.  Feeling much better.  No nausea, vomiting, pain, melena, etc.  Hungry.    Review of Systems:  Review of Systems   Constitutional: Negative.    HENT: Negative.    Respiratory: Negative.    Cardiovascular: Negative.    Gastrointestinal: Negative.    Musculoskeletal: Negative.    Skin: Negative.    Psychiatric/Behavioral: Negative.        Physical Exam:  Physical Exam   Constitutional: She is oriented to person, place, and time. She appears well-developed and well-nourished.   HENT:   Head: Normocephalic and atraumatic.   Eyes: Conjunctivae are normal.   Neck: Neck supple.   Cardiovascular: Normal rate and regular rhythm.    Pulmonary/Chest: Effort normal and breath sounds normal.   Abdominal: Soft. Bowel sounds are normal.   Neurological: She is alert and oriented to person, place, and time.   Skin: Skin is warm and dry.   Psychiatric: She has a normal mood and affect. Her behavior is normal.   Nursing note and vitals reviewed.      Labs:  Recent Labs      03/03/17   0505   ISTATAPH  7.403   ISTATAPCO2  22.3*   ISTATAPO2  76   ISTATATCO2  15*   [id number]  96   ISTATARTHCO3  13.9*   ISTATARTBE  -10*   ISTATTEMP  37.0 C   ISTATFIO2  30   ISTATSPEC  Arterial   ISTATAPHTC  7.403   [id number]  76     Recent Labs      03/02/17   1540  03/02/17   2349  03/03/17   0504   TROPONINI  0.23*  0.22*  0.18*     Recent Labs      03/03/17   0504  03/04/17   0435  03/05/17   0229   SODIUM  147*  148*  144   POTASSIUM  3.4*  3.9  3.6   CHLORIDE  123*  120*  113*   CO2  15*  17*  21   BUN  68*  56*  36*   CREATININE  1.53*  1.40  1.14   MAGNESIUM  1.8   --    --    PHOSPHORUS  2.6   --    --    CALCIUM  7.7*  8.3*  8.1*     Recent Labs      03/03/17   0504  03/04/17   0435  03/05/17   0229   GLUCOSE  109*  115*  96     Recent Labs      03/03/17   0500  03/04/17   0435  03/05/17   0229   RBC  3.87*  3.78*  3.79*   HEMOGLOBIN  11.6*  11.3*  11.3*   HEMATOCRIT  33.4*  34.3*  33.7*   PLATELETCT  85*  96*  90*      Recent Labs      17   0500  17   0435  17   0229   WBC  8.0  7.1  6.1   NEUTSPOLYS  68.90  70.50   --    LYMPHOCYTES  20.00*  19.20*   --    MONOCYTES  8.10  6.60   --    EOSINOPHILS  2.10  2.70   --    BASOPHILS  0.50  0.40   --      Hemodynamics:  Temp (24hrs), Av.2 °C (97.2 °F), Min:36 °C (96.8 °F), Max:36.9 °C (98.4 °F)  Temperature: 36.2 °C (97.1 °F), Monitored Temp: 37.3 °C (99.1 °F)  Pulse  Av.5  Min: 41  Max: 142Heart Rate (Monitored): 85  Blood Pressure : (!) 162/97 mmHg (nurse notified), NIBP: (!) 176/97 mmHg    Respiratory:    Respiration: 16, Pulse Oximetry: 98 %     Work Of Breathing / Effort: Mild  RUL Breath Sounds: Clear, RML Breath Sounds: Clear, RLL Breath Sounds: Diminished, YANICK Breath Sounds: Clear, LLL Breath Sounds: Diminished  Fluids:    Intake/Output Summary (Last 24 hours) at 17 0848  Last data filed at 17 0600   Gross per 24 hour   Intake 5807.81 ml   Output   1575 ml   Net 4232.81 ml        GI/Nutrition:  Orders Placed This Encounter   Procedures   • DIET ORDER     Standing Status: Standing      Number of Occurrences: 1      Standing Expiration Date:      Order Specific Question:  Diet:     Answer:  Clear Liquid [10]     Order Specific Question:  Consistency/Fluid modifications:     Answer:  Nectar Thick [2]     Medical Decision Making, by Problem:  Active Hospital Problems    Diagnosis   • Shock circulatory (CMS-[facility]) [R57.9]   • Acute respiratory failure with hypoxia (CMS-[facility]) [J96.01]   • ARF (acute renal failure) (CMS-[facility]) [N17.9]   • GI bleed [K92.2]   • Anemia due to blood loss, acute [D62]   • HTN (hypertension) [I10]   • Hypothyroid [E03.9]   • Lactic acidosis [E87.2]   • Hypocalcemia [E83.51]   • Elevated troponin [R79.89]       Impression / Plan:  1.  Acute GI bleed anemia from suspected duodenal source - unclear if ulcer vs neoplasm  2.  Hematochezia - resolved  3.  Hypertension  4.  Abdominal pain, generalized - Resolved  5.  Nausea and  vomiting - Resolved  6.  Renal insufficiency  7.  Coagulopathy  8.  Hypertension  9.  Hypothyroidism     PLAN AND RECOMMENDATIONS:  1.  Continue supportive  2.  Transfuse packed red blood cells as you are to keep Hb >7  3.  Serial labs including hemoglobin, hematocrit and INR  4.  Will potententially need CT with contrast once renal status improved  5.  EGD tomorrow with Dr. Nguyen with anesthesia to evaluate for source of her bleed  6.  Pantoprazole 80 mg IV bolus followed by 8 mg IV per hour - will readdress after endoscopy  7.  Maintain good IV access    Labs reviewed, Medications reviewed, EKG reviewed and Radiology images reviewed

## 2017-03-06 PROBLEM — K31.89 DUODENAL MASS: Status: ACTIVE | Noted: 2017-03-06

## 2017-03-06 PROBLEM — J81.1 PULMONARY EDEMA: Status: ACTIVE | Noted: 2017-03-06

## 2017-03-06 LAB
ALBUMIN SERPL BCP-MCNC: 2.6 G/DL (ref 3.2–4.9)
BACTERIA BLD CULT: NORMAL
BACTERIA BLD CULT: NORMAL
BASOPHILS # BLD AUTO: 0.5 % (ref 0–1.8)
BASOPHILS # BLD: 0.03 K/UL (ref 0–0.12)
BUN SERPL-MCNC: 17 MG/DL (ref 8–22)
CALCIUM SERPL-MCNC: 8.2 MG/DL (ref 8.5–10.5)
CHLORIDE SERPL-SCNC: 104 MMOL/L (ref 96–112)
CO2 SERPL-SCNC: 27 MMOL/L (ref 20–33)
CREAT SERPL-MCNC: 0.81 MG/DL (ref 0.5–1.4)
EOSINOPHIL # BLD AUTO: 0.38 K/UL (ref 0–0.51)
EOSINOPHIL NFR BLD: 6.4 % (ref 0–6.9)
ERYTHROCYTE [DISTWIDTH] IN BLOOD BY AUTOMATED COUNT: 45.2 FL (ref 35.9–50)
GFR SERPL CREATININE-BSD FRML MDRD: >60 ML/MIN/1.73 M 2
GLUCOSE SERPL-MCNC: 107 MG/DL (ref 65–99)
HCT VFR BLD AUTO: 33.8 % (ref 37–47)
HGB BLD-MCNC: 11.3 G/DL (ref 12–16)
IMM GRANULOCYTES # BLD AUTO: 0.02 K/UL (ref 0–0.11)
IMM GRANULOCYTES NFR BLD AUTO: 0.3 % (ref 0–0.9)
LYMPHOCYTES # BLD AUTO: 1.27 K/UL (ref 1–4.8)
LYMPHOCYTES NFR BLD: 21.5 % (ref 22–41)
MCH RBC QN AUTO: 29.5 PG (ref 27–33)
MCHC RBC AUTO-ENTMCNC: 33.4 G/DL (ref 33.6–35)
MCV RBC AUTO: 88.3 FL (ref 81.4–97.8)
MONOCYTES # BLD AUTO: 0.56 K/UL (ref 0–0.85)
MONOCYTES NFR BLD AUTO: 9.5 % (ref 0–13.4)
NEUTROPHILS # BLD AUTO: 3.66 K/UL (ref 2–7.15)
NEUTROPHILS NFR BLD: 61.8 % (ref 44–72)
NRBC # BLD AUTO: 0 K/UL
NRBC BLD AUTO-RTO: 0 /100 WBC
PHOSPHATE SERPL-MCNC: 2.8 MG/DL (ref 2.5–4.5)
PLATELET # BLD AUTO: 100 K/UL (ref 164–446)
PMV BLD AUTO: 10.6 FL (ref 9–12.9)
POTASSIUM SERPL-SCNC: 3.2 MMOL/L (ref 3.6–5.5)
RBC # BLD AUTO: 3.83 M/UL (ref 4.2–5.4)
SIGNIFICANT IND 70042: NORMAL
SIGNIFICANT IND 70042: NORMAL
SITE SITE: NORMAL
SITE SITE: NORMAL
SODIUM SERPL-SCNC: 139 MMOL/L (ref 135–145)
SOURCE SOURCE: NORMAL
SOURCE SOURCE: NORMAL
WBC # BLD AUTO: 5.9 K/UL (ref 4.8–10.8)

## 2017-03-06 PROCEDURE — 700102 HCHG RX REV CODE 250 W/ 637 OVERRIDE(OP): Performed by: INTERNAL MEDICINE

## 2017-03-06 PROCEDURE — 160048 HCHG OR STATISTICAL LEVEL 1-5: Performed by: INTERNAL MEDICINE

## 2017-03-06 PROCEDURE — 160203 HCHG ENDO MINUTES - 1ST 30 MINS LEVEL 4: Performed by: INTERNAL MEDICINE

## 2017-03-06 PROCEDURE — A9270 NON-COVERED ITEM OR SERVICE: HCPCS | Performed by: INTERNAL MEDICINE

## 2017-03-06 PROCEDURE — 80069 RENAL FUNCTION PANEL: CPT

## 2017-03-06 PROCEDURE — C9113 INJ PANTOPRAZOLE SODIUM, VIA: HCPCS | Performed by: INTERNAL MEDICINE

## 2017-03-06 PROCEDURE — 700111 HCHG RX REV CODE 636 W/ 250 OVERRIDE (IP): Performed by: INTERNAL MEDICINE

## 2017-03-06 PROCEDURE — 700102 HCHG RX REV CODE 250 W/ 637 OVERRIDE(OP): Performed by: HOSPITALIST

## 2017-03-06 PROCEDURE — 99232 SBSQ HOSP IP/OBS MODERATE 35: CPT | Performed by: INTERNAL MEDICINE

## 2017-03-06 PROCEDURE — 160002 HCHG RECOVERY MINUTES (STAT): Performed by: INTERNAL MEDICINE

## 2017-03-06 PROCEDURE — 700111 HCHG RX REV CODE 636 W/ 250 OVERRIDE (IP)

## 2017-03-06 PROCEDURE — 500122 HCHG BOVIE, BLADE: Performed by: INTERNAL MEDICINE

## 2017-03-06 PROCEDURE — 500066 HCHG BITE BLOCK, ECT: Performed by: INTERNAL MEDICINE

## 2017-03-06 PROCEDURE — 770006 HCHG ROOM/CARE - MED/SURG/GYN SEMI*

## 2017-03-06 PROCEDURE — 110382 HCHG SHELL REV 271: Performed by: INTERNAL MEDICINE

## 2017-03-06 PROCEDURE — 97162 PT EVAL MOD COMPLEX 30 MIN: CPT

## 2017-03-06 PROCEDURE — 0DB68ZX EXCISION OF STOMACH, VIA NATURAL OR ARTIFICIAL OPENING ENDOSCOPIC, DIAGNOSTIC: ICD-10-PCS | Performed by: INTERNAL MEDICINE

## 2017-03-06 PROCEDURE — 36415 COLL VENOUS BLD VENIPUNCTURE: CPT

## 2017-03-06 PROCEDURE — 88312 SPECIAL STAINS GROUP 1: CPT

## 2017-03-06 PROCEDURE — A9270 NON-COVERED ITEM OR SERVICE: HCPCS | Performed by: HOSPITALIST

## 2017-03-06 PROCEDURE — 160035 HCHG PACU - 1ST 60 MINS PHASE I: Performed by: INTERNAL MEDICINE

## 2017-03-06 PROCEDURE — 160009 HCHG ANES TIME/MIN: Performed by: INTERNAL MEDICINE

## 2017-03-06 PROCEDURE — 88305 TISSUE EXAM BY PATHOLOGIST: CPT

## 2017-03-06 PROCEDURE — G8979 MOBILITY GOAL STATUS: HCPCS | Mod: CI

## 2017-03-06 PROCEDURE — G8978 MOBILITY CURRENT STATUS: HCPCS | Mod: CL

## 2017-03-06 PROCEDURE — 502240 HCHG MISC OR SUPPLY RC 0272: Performed by: INTERNAL MEDICINE

## 2017-03-06 PROCEDURE — 160036 HCHG PACU - EA ADDL 30 MINS PHASE I: Performed by: INTERNAL MEDICINE

## 2017-03-06 PROCEDURE — A4606 OXYGEN PROBE USED W OXIMETER: HCPCS | Performed by: INTERNAL MEDICINE

## 2017-03-06 PROCEDURE — 700101 HCHG RX REV CODE 250

## 2017-03-06 PROCEDURE — 700105 HCHG RX REV CODE 258: Performed by: INTERNAL MEDICINE

## 2017-03-06 PROCEDURE — 85025 COMPLETE CBC W/AUTO DIFF WBC: CPT

## 2017-03-06 RX ORDER — LEVOTHYROXINE SODIUM 112 UG/1
112 TABLET ORAL
Status: DISCONTINUED | OUTPATIENT
Start: 2017-03-06 | End: 2017-03-07 | Stop reason: HOSPADM

## 2017-03-06 RX ORDER — POTASSIUM CHLORIDE 20 MEQ/1
40 TABLET, EXTENDED RELEASE ORAL 2 TIMES DAILY
Status: COMPLETED | OUTPATIENT
Start: 2017-03-06 | End: 2017-03-07

## 2017-03-06 RX ORDER — PANTOPRAZOLE SODIUM 40 MG/10ML
40 INJECTION, POWDER, LYOPHILIZED, FOR SOLUTION INTRAVENOUS 2 TIMES DAILY
Status: DISCONTINUED | OUTPATIENT
Start: 2017-03-06 | End: 2017-03-07

## 2017-03-06 RX ORDER — FUROSEMIDE 10 MG/ML
20 INJECTION INTRAMUSCULAR; INTRAVENOUS
Status: DISCONTINUED | OUTPATIENT
Start: 2017-03-06 | End: 2017-03-07

## 2017-03-06 RX ADMIN — AMLODIPINE BESYLATE 10 MG: 10 TABLET ORAL at 12:07

## 2017-03-06 RX ADMIN — NYSTATIN 1500000 UNITS: 100000 POWDER TOPICAL at 08:38

## 2017-03-06 RX ADMIN — PANTOPRAZOLE SODIUM 40 MG: 40 INJECTION, POWDER, FOR SOLUTION INTRAVENOUS at 20:15

## 2017-03-06 RX ADMIN — STANDARDIZED SENNA CONCENTRATE AND DOCUSATE SODIUM 2 TABLET: 8.6; 5 TABLET, FILM COATED ORAL at 12:07

## 2017-03-06 RX ADMIN — PANTOPRAZOLE SODIUM 40 MG: 40 INJECTION, POWDER, FOR SOLUTION INTRAVENOUS at 11:15

## 2017-03-06 RX ADMIN — ACETAMINOPHEN 650 MG: 325 TABLET, FILM COATED ORAL at 19:40

## 2017-03-06 RX ADMIN — FOLIC ACID 1 MG: 1 TABLET ORAL at 09:00

## 2017-03-06 RX ADMIN — FUROSEMIDE 20 MG: 10 INJECTION, SOLUTION INTRAVENOUS at 08:35

## 2017-03-06 RX ADMIN — STANDARDIZED SENNA CONCENTRATE AND DOCUSATE SODIUM 2 TABLET: 8.6; 5 TABLET, FILM COATED ORAL at 19:40

## 2017-03-06 RX ADMIN — POTASSIUM CHLORIDE 40 MEQ: 1500 TABLET, EXTENDED RELEASE ORAL at 09:00

## 2017-03-06 RX ADMIN — SODIUM CHLORIDE 8 MG/HR: 9 INJECTION, SOLUTION INTRAVENOUS at 07:23

## 2017-03-06 RX ADMIN — NYSTATIN 1500000 UNITS: 100000 POWDER TOPICAL at 19:40

## 2017-03-06 RX ADMIN — NYSTATIN 1500000 UNITS: 100000 POWDER TOPICAL at 13:38

## 2017-03-06 RX ADMIN — POTASSIUM CHLORIDE 40 MEQ: 1500 TABLET, EXTENDED RELEASE ORAL at 19:40

## 2017-03-06 ASSESSMENT — ENCOUNTER SYMPTOMS
ROS GI COMMENTS: TOLERATING CLEARS
FEVER: 0
FOCAL WEAKNESS: 0
COUGH: 1
VOMITING: 0
WEAKNESS: 1
SHORTNESS OF BREATH: 0
PALPITATIONS: 0
MEMORY LOSS: 1
DIZZINESS: 0
ABDOMINAL PAIN: 0

## 2017-03-06 ASSESSMENT — PAIN SCALES - GENERAL
PAINLEVEL_OUTOF10: 0
PAINLEVEL_OUTOF10: 3
PAINLEVEL_OUTOF10: 0
PAINLEVEL_OUTOF10: 1
PAINLEVEL_OUTOF10: 0

## 2017-03-06 ASSESSMENT — GAIT ASSESSMENTS
DEVIATION: ANTALGIC;BRADYKINETIC;SHUFFLED GAIT;DECREASED BASE OF SUPPORT
ASSISTIVE DEVICE: FRONT WHEEL WALKER
GAIT LEVEL OF ASSIST: MINIMAL ASSIST
DISTANCE (FEET): 1

## 2017-03-06 NOTE — CARE PLAN
Problem: Skin Integrity  Goal: Risk for impaired skin integrity will decrease  Skin assessed  Pillows in use for support, heels floated  Barrier cream in use   nystatin powder in use on groin  mepilex applied to sacrum  Educated pt to turn    Problem: Respiratory:  Goal: Respiratory status will improve  Pt pulls 1250 mL with Is   in use  Lungs diminished in bases

## 2017-03-06 NOTE — OP REPORT
Operative Note  Service Date: 3/6/2017     PreOp Diagnosis: Duodenal ulcer    PostOp Diagnosis:     Esophagus: Normal    Stomach:   A. 4 cm hiatal hernia  B. Gastritis, patchy, antrum, s/p bx    Duodenum:   A. Deformed bulb with giant non-bleeding ulcer, covering > 50% of duodenal bulb, with exposed coil, likely from prior embolization    Procedure(s):  GASTROSCOPY - Wound Class: Contaminated    Surgeon(s):  Wendy Nguyen M.D.    Anesthesiologist/Type of Anesthesia:  Anesthesiologist: Nabor Lovett M.D./General    Surgical Staff:  Circulator: Xena Mota R.N.  Endoscopy Technician: Ed Botello    Complication: Nil    Anesthesia/Medications:  see anesthesia note     COMPLICATIONS:  No immediate complications.    PROCEDURE IN DETAIL:      -Prior to the procedure, a History and Physical was performed, and patient medications and allergies were reviewed. The patient’s tolerance of previous anesthesia was also reviewed. The risks and benefits of the procedure and the sedation options and risks were discussed with the patient. All questions were answered, and informed consent was obtained. The patient was deemed in satisfactory condition to undergo the procedure.    -Prior Anticoagulants: the patient has taken no previous anticoagulant or antiplatelet agents.    -ASA Grade Assessment: see anesthesia note     -The patient was placed in the left lateral decubitus position. The scope was passed under direct vision. Continuous oxygen was provided via nasal cannula and intravenous sedation was administered in divided doses throughout the procedure. The patient’s blood pressure, pulse, and oxygen saturation were monitored continuously throughout the procedure.    -The gastroscope was gently advanced under direct visualization over the tongue, down the esophagus, through the stomach and into the 2nd portion of the duodenum. The color, texture, mucosa and anatomy of esophagus, stomach and duodenum were carefully  examined with the scope. The scope was then withdrawn from the patient and the procedure terminated.     -The patient tolerated the procedure well and there were no immediate complications. The patient was then transferred to the recovery room in stable condition.    Findings and ENDOSCOPIC DIAGNOSIS:      Findings:  Esophagus: Normal  Stomach: A. 4 cm hiatal hernia; B. Gastritis, patchy, antrum, s/p bx  Duodenum: A. Deformed bulb with giant non-bleeding ulcer, covering > 50% of duodenal bulb, with exposed coil, likely from prior embolization    RECOMMENDATIONS:    1. A letter will be sent regarding to the biopsy result in about 2 weeks.  2. Resume diet, GI soft  3. Ok to change to IV PPI BIDAC  4. Avoid NSAIDs  5. Tx H pylori if positive on gastric biopsy  6. Monitor H/H and vitals.  7. Ok to D/C home once H?H is stable and tolerating diet  8. F/U with Dr. Maharaj in 4 weeks.    3/6/2017 11:47 AM Wendy Nguyen

## 2017-03-06 NOTE — PROGRESS NOTES
Pt assessed, VSS, A & O x 4. Pt denies any CP, SOB, dizziness, or other pain. POC explained, pt verbalizes an understanding. Educated pt regarding falls and fall safety, pt verbalizes an understanding. Socks on, call light in place, bed alarm on, bed lowered and locked, all comfort measures in place and needs met at this time.

## 2017-03-06 NOTE — THERAPY
Occupational Therapy Contact Note    Attempted OT katerina ogden in a procedure. Will re-attempt tomorrow as able.     Shari Cuellar, OTR/L

## 2017-03-06 NOTE — THERAPY
"Physical Therapy Evaluation completed.   Bed Mobility:  Supine to Sit: Minimal Assist  Transfers: Sit to Stand: Minimal Assist (to CGA)  Gait: Level Of Assist: Minimal Assist with Front-Wheel Walker       Plan of Care: Will benefit from Physical Therapy 3 times per week  Discharge Recommendations: Equipment: Will Continue to Assess for Equipment Needs. Post-acute therapy recommended before discharged home.    See \"Rehab Therapy-Acute\" Patient Summary Report for complete documentation.     "

## 2017-03-06 NOTE — CARE PLAN
Problem: Knowledge Deficit  Goal: Knowledge of the prescribed therapeutic regimen will improve  Intervention: Discuss information regarding therpeutic regimen and document in education  PT updated on POC.  Verbalizes understanding.       Problem: Skin Integrity  Goal: Risk for impaired skin integrity will decrease  Intervention: Assess and monitor skin integrity, appearance and/or temperature  Skin assessed and documented.

## 2017-03-06 NOTE — PROGRESS NOTES
Bedside report completed, assumed pt care. Pt resting in bed, A&Ox4. Assessment complete.   Pt refused SCDs despite education  Pt incontinent of urine, changed sheets, barrier cream applied  mepilex applied to coccyx, red blanching  Pt has red rash forming in groin, nystatin applied per MAR  Pt encouraged to turn every tow hours, pt agreeable, can self turn  Heels floated on pillows  Pillows in use for support and turning  Pt skin in groin/sacrum moist  Diminished lung bases   in use  Pt pulls 1250 mL with IS  Incontinent of urine  +BM +flatus  Pt denies numbness tingling chest pain SOB and nausea  No complaints of pain.   Discussed plan of care with pt. Call light within reach, bed in low position, possessions within reach, treaded socks on, floor free from trip hazard, Hourly rounding in place.

## 2017-03-07 VITALS
SYSTOLIC BLOOD PRESSURE: 146 MMHG | BODY MASS INDEX: 24.56 KG/M2 | DIASTOLIC BLOOD PRESSURE: 77 MMHG | HEIGHT: 68 IN | OXYGEN SATURATION: 90 % | HEART RATE: 83 BPM | TEMPERATURE: 96.9 F | WEIGHT: 162.04 LBS | RESPIRATION RATE: 16 BRPM

## 2017-03-07 PROCEDURE — A9270 NON-COVERED ITEM OR SERVICE: HCPCS | Performed by: INTERNAL MEDICINE

## 2017-03-07 PROCEDURE — 700111 HCHG RX REV CODE 636 W/ 250 OVERRIDE (IP): Performed by: INTERNAL MEDICINE

## 2017-03-07 PROCEDURE — A9270 NON-COVERED ITEM OR SERVICE: HCPCS | Performed by: HOSPITALIST

## 2017-03-07 PROCEDURE — C9113 INJ PANTOPRAZOLE SODIUM, VIA: HCPCS | Performed by: INTERNAL MEDICINE

## 2017-03-07 PROCEDURE — 97165 OT EVAL LOW COMPLEX 30 MIN: CPT

## 2017-03-07 PROCEDURE — 700102 HCHG RX REV CODE 250 W/ 637 OVERRIDE(OP): Performed by: HOSPITALIST

## 2017-03-07 PROCEDURE — 700102 HCHG RX REV CODE 250 W/ 637 OVERRIDE(OP): Performed by: INTERNAL MEDICINE

## 2017-03-07 PROCEDURE — G8989 SELF CARE D/C STATUS: HCPCS | Mod: CI

## 2017-03-07 PROCEDURE — G8987 SELF CARE CURRENT STATUS: HCPCS | Mod: CI

## 2017-03-07 PROCEDURE — 99239 HOSP IP/OBS DSCHRG MGMT >30: CPT | Performed by: INTERNAL MEDICINE

## 2017-03-07 PROCEDURE — G8988 SELF CARE GOAL STATUS: HCPCS | Mod: CI

## 2017-03-07 RX ORDER — FOLIC ACID 1 MG/1
1 TABLET ORAL DAILY
Qty: 30 TAB | Refills: 1 | Status: ON HOLD | OUTPATIENT
Start: 2017-03-07 | End: 2018-09-25 | Stop reason: CLARIF

## 2017-03-07 RX ORDER — POTASSIUM CHLORIDE 20 MEQ/1
20 TABLET, EXTENDED RELEASE ORAL 2 TIMES DAILY
Qty: 30 TAB | Refills: 0 | Status: ON HOLD | OUTPATIENT
Start: 2017-03-07 | End: 2018-09-25 | Stop reason: CLARIF

## 2017-03-07 RX ORDER — POTASSIUM CHLORIDE 20 MEQ/1
20 TABLET, EXTENDED RELEASE ORAL 2 TIMES DAILY
Status: DISCONTINUED | OUTPATIENT
Start: 2017-03-07 | End: 2017-03-07 | Stop reason: HOSPADM

## 2017-03-07 RX ORDER — OMEPRAZOLE 20 MG/1
20 CAPSULE, DELAYED RELEASE ORAL 2 TIMES DAILY
Qty: 60 CAP | Refills: 1 | Status: ON HOLD | OUTPATIENT
Start: 2017-03-07 | End: 2018-09-25 | Stop reason: CLARIF

## 2017-03-07 RX ORDER — AMLODIPINE BESYLATE 10 MG/1
10 TABLET ORAL DAILY
Qty: 30 TAB | Refills: 1 | Status: ON HOLD | OUTPATIENT
Start: 2017-03-07 | End: 2018-09-25 | Stop reason: CLARIF

## 2017-03-07 RX ORDER — OMEPRAZOLE 20 MG/1
20 CAPSULE, DELAYED RELEASE ORAL 2 TIMES DAILY
Status: DISCONTINUED | OUTPATIENT
Start: 2017-03-07 | End: 2017-03-07 | Stop reason: HOSPADM

## 2017-03-07 RX ADMIN — POTASSIUM CHLORIDE 20 MEQ: 1500 TABLET, EXTENDED RELEASE ORAL at 14:03

## 2017-03-07 RX ADMIN — PANTOPRAZOLE SODIUM 40 MG: 40 INJECTION, POWDER, FOR SOLUTION INTRAVENOUS at 08:13

## 2017-03-07 RX ADMIN — OMEPRAZOLE 20 MG: 20 CAPSULE, DELAYED RELEASE ORAL at 14:04

## 2017-03-07 RX ADMIN — POTASSIUM CHLORIDE 40 MEQ: 1500 TABLET, EXTENDED RELEASE ORAL at 08:12

## 2017-03-07 RX ADMIN — FUROSEMIDE 20 MG: 10 INJECTION, SOLUTION INTRAVENOUS at 08:13

## 2017-03-07 RX ADMIN — LEVOTHYROXINE SODIUM 112 MCG: 112 TABLET ORAL at 05:07

## 2017-03-07 RX ADMIN — AMLODIPINE BESYLATE 10 MG: 10 TABLET ORAL at 08:12

## 2017-03-07 RX ADMIN — FOLIC ACID 1 MG: 1 TABLET ORAL at 08:13

## 2017-03-07 ASSESSMENT — ACTIVITIES OF DAILY LIVING (ADL): TOILETING: INDEPENDENT

## 2017-03-07 NOTE — DISCHARGE INSTRUCTIONS
Discharge Instructions    DO NOT take NSAIDs, and alcohol.    Discharged to home by car with relative. Discharged via wheelchair, hospital escort: Yes.  Special equipment needed: Not Applicable    Be sure to schedule a follow-up appointment with your primary care doctor or any specialists as instructed.     Discharge Plan:   Influenza Vaccine Indication: Not indicated: Previously immunized this influenza season and > 8 years of age    I understand that a diet low in cholesterol, fat, and sodium is recommended for good health. Unless I have been given specific instructions below for another diet, I accept this instruction as my diet prescription.   Other diet: GI soft diet    Special Instructions: None    · Is patient discharged on Warfarin / Coumadin?   No     · Is patient Post Blood Transfusion?  No      Depression / Suicide Risk    As you are discharged from this [facility] facility, it is important to learn how to keep safe from harming yourself.    Recognize the warning signs:  · Abrupt changes in personality, positive or negative- including increase in energy   · Giving away possessions  · Change in eating patterns- significant weight changes-  positive or negative  · Change in sleeping patterns- unable to sleep or sleeping all the time   · Unwillingness or inability to communicate  · Depression  · Unusual sadness, discouragement and loneliness  · Talk of wanting to die  · Neglect of personal appearance   · Rebelliousness- reckless behavior  · Withdrawal from people/activities they love  · Confusion- inability to concentrate     If you or a loved one observes any of these behaviors or has concerns about self-harm, here's what you can do:  · Talk about it- your feelings and reasons for harming yourself  · Remove any means that you might use to hurt yourself (examples: pills, rope, extension cords, firearm)  · Get professional help from the community (Mental Health, Substance Abuse, psychological  counseling)  · Do not be alone:Call your Safe Contact- someone whom you trust who will be there for you.  · Call your local CRISIS HOTLINE 019-0619 or 298-378-9179  · Call your local Children's Mobile Crisis Response Team [place] (992) 422-1814 or www.[employer]  · Call the toll free National Suicide Prevention Hotlines   · National Suicide Prevention Lifeline 069-450-TLMP (9137)  · Incentive Hope Line Network 800-SUICIDE (992-7734)        Gastrointestinal Bleeding  Gastrointestinal (GI) bleeding means there is bleeding somewhere along the digestive tract, between the mouth and anus.  CAUSES   There are many different problems that can cause GI bleeding. Possible causes include:  · Esophagitis. This is inflammation, irritation, or swelling of the esophagus.  · Hemorrhoids. These are veins that are full of blood (engorged) in the rectum. They cause pain, inflammation, and may bleed.  · Anal fissures. These are areas of painful tearing which may bleed. They are often caused by passing hard stool.  · Diverticulosis. These are pouches that form on the colon over time, with age, and may bleed significantly.  · Diverticulitis. This is inflammation in areas with diverticulosis. It can cause pain, fever, and bloody stools, although bleeding is rare.  · Polyps and cancer. Colon cancer often starts out as precancerous polyps.  · Gastritis and ulcers. Bleeding from the upper gastrointestinal tract (near the stomach) may travel through the intestines and produce black, sometimes tarry, often bad smelling stools. In certain cases, if the bleeding is fast enough, the stools may not be black, but red. This condition may be life-threatening.  SYMPTOMS   2. Vomiting bright red blood or material that looks like coffee grounds.  3. Bloody, black, or tarry stools.  DIAGNOSIS   Your caregiver may diagnose your condition by taking your history and performing a physical exam. More tests may be needed, includin. X-rays and  other imaging tests.  3. Esophagogastroduodenoscopy (EGD). This test uses a flexible, lighted tube to look at your esophagus, stomach, and small intestine.  4. Colonoscopy. This test uses a flexible, lighted tube to look at your colon.  TREATMENT   Treatment depends on the cause of your bleeding.   2. For bleeding from the esophagus, stomach, small intestine, or colon, the caregiver doing your EGD or colonoscopy may be able to stop the bleeding as part of the procedure.  3. Inflammation or infection of the colon can be treated with medicines.  4. Many rectal problems can be treated with creams, suppositories, or warm baths.  5. Surgery is sometimes needed.  6. Blood transfusions are sometimes needed if you have lost a lot of blood.  If bleeding is slow, you may be allowed to go home. If there is a lot of bleeding, you will need to stay in the hospital for observation.  HOME CARE INSTRUCTIONS   2. Take any medicines exactly as prescribed.  3. Keep your stools soft by eating foods that are high in fiber. These foods include whole grains, legumes, fruits, and vegetables. Prunes (1 to 3 a day) work well for many people.  4. Drink enough fluids to keep your urine clear or pale yellow.  SEEK IMMEDIATE MEDICAL CARE IF:   · Your bleeding increases.  · You feel lightheaded, weak, or you faint.  · You have severe cramps in your back or abdomen.  · You pass large blood clots in your stool.  · Your problems are getting worse.  MAKE SURE YOU:   · Understand these instructions.  · Will watch your condition.  · Will get help right away if you are not doing well or get worse.     This information is not intended to replace advice given to you by your health care provider. Make sure you discuss any questions you have with your health care provider.     Document Released: 12/15/2001 Document Revised: 12/04/2013 Document Reviewed: 11/26/2012  [employer] Interactive Patient Education ©2016 [employer]

## 2017-03-07 NOTE — THERAPY
"Occupational Therapy Evaluation completed.   Functional Status: Pt seen today for OT eval. Pt is supv for basic self cares and functional mobility/txfs with FWW. Pt fatigues easily but no LOB noted. Pt given compensatory strategies for txfs and ADLs. Pt denies any further deficits in ADLs at this time and will have assist from her SO as needed.  Plan of Care: Patient with no further skilled OT needs in the acute care setting at this time  Discharge Recommendations:  Equipment: Front-Wheel Walker. Post-acute therapy recommended after discharged home -  PT/OT.    See \"Rehab Therapy-Acute\" Patient Summary Report for complete documentation.    "

## 2017-03-07 NOTE — PROGRESS NOTES
Gastroenterology Progress Note:    Ting-Dona Patrick  Date & Time note created:    3/7/2017   7:03 AM     Patient ID:  Name:             Shannon Balbuena  YOB: 1950  Age:                 66 y.o.  female  MRN:               [id number]    Referring MD:  Dr. Rawls                                                             Chief Complaint(s):      Duodenal bleeding    History of Present Illness:    She has had frequent daily use of ibuprofen recently for chronic low back pain.  She also has frequent heartburn, but denies dysphagia, last evening early morning hours around 4:00 a.m., she awoke and had passage of a large amount of bright red blood per rectum, she became lightheaded, EMS was called, per the patient this has never happened before.  She has never had EGD nor colonoscopy nor seen a gastroenterologist.  No pertinent family history of luminal GI disease or liver disease.  The patient does drink alcohol, estimating 2 glasses of whiskey per night.  Her initial labs showed hemoglobin 4.3.  She received 2 units urgently here, her hemoglobin juliet to 6.  She is receiving a third unit.  Her platelet count was low at 100,000.  Her BUN is elevated at 98 with a creatinine of 2.67.  INR was elevated at 1.47.  Troponin I was initially 0.25, the patient denies any chest pain or palpitations or shortness of breath.  She is weak and lightheaded.  She has been having some vague abdominal pain, which is a kind of a generalized.  No identified alleviating or aggravating factors, per se.  She has had loss of appetite and thinks that eating might make the pain worse.  No radiation of the pain.  She is hypotensive.    3/2/17 - EGD was attempted yesterday by Dr. Maharaj.  She had massive amount of blood obscuring duodenum.  Could not be stopped endoscopically, but then went to IR and hemostasis was achieved.  She has been relatively stable overnight without further acute overt bleeding.  On vent.  3/3/17 - No  signs of acute / overt GI bleeding.  No events overnignt.  Planned for extubation today.  Hb stable without further transfusion requirements.  3/4/17 - Extubated overnight.  Awake, alert with no complaints.  No further signs of bleeding.  3/5/17 - No events overnight.  Feeling much better.  No nausea, vomiting, pain, melena, etc.  Hungry.  3/6/17 EGD: DU, large, gastritis.  3/7/17 Doing ok. Tolerating diet. Brown stool. No pain.    Otherwise the patient is doing fine without complaints of fever/chills/weight loss/appetite change/dysphagia/odynophagia/heartburn/nausea/vomiting/bloating/constipation/diarrhea/melena/hematochezia or abdominal pain.    Review of Systems:      Constitutional: Denies fevers, weight changes  Eyes: Denies changes in vision, jaundice  Ears/Nose/Throat/Mouth: Denies nasal congestion or sore throat   Cardiovascular: Denies chest pain, Denies palpitations   Respiratory: Denies shortness of breath, denies cough  Gastrointestinal/Hepatic: Denies abdominal pain, nausea, vomiting, diarrhea, constipation or GI bleeding   Genitourinary: Denies dysuria or frequency  Musculoskeletal/Rheum: Denies  joint pain and swelling, edema  Skin: Denies rash  Neurological: Denies headache, confusion, memory loss or focal weakness/parasthesias  Psychiatric: denies mood disorder   Endocrine: Candice thyroid problems  Heme/Oncology/Lymph Nodes: Denies enlarged lymph nodes, denies brusing or known bleeding disorder  All other systems were reviewed and are negative (AMA/CMS criteria)              Past Medical History:   No past medical history on file.  Active Hospital Problems    Diagnosis   • Shock circulatory (CMS-[facility]) [R57.9]     Priority: High   • Acute respiratory failure with hypoxia (CMS-Former[facility]) [J96.01]     Priority: Medium   • ARF (acute renal failure) (CMS-Formerly [facility]) [N17.9]     Priority: Medium   • GI bleed [K92.2]     Priority: Medium   • Pulmonary edema [J81.1]   • Duodenal mass [K31.89]   • Hypotension [I95.9]   •  Symptomatic anemia [D64.9]   • Anemia due to blood loss, acute [D62]   • HTN (hypertension) [I10]   • Hypothyroid [E03.9]   • Lactic acidosis [E87.2]   • Hypocalcemia [E83.51]   • Elevated troponin [R79.89]       Past Surgical History:  Past Surgical History   Procedure Laterality Date   • Gastroscopy-endo  3/1/2017     Procedure: GASTROSCOPY-ENDO;  Surgeon: Bishop Maharaj M.D.;  Location: ENDOSCOPY [facility];  Service:    • Gastroscopy with sclerotherapy  3/1/2017     Procedure: GASTROSCOPY WITH SCLEROTHERAPY;  Surgeon: Bishop Maharaj M.D.;  Location: ENDOSCOPY [facility];  Service:    • Gastroscopy N/A 3/6/2017     Procedure: GASTROSCOPY;  Surgeon: Wendy Nguyen M.D.;  Location: SURGERY [facility];  Service:        Hospital Medications:  Current Facility-Administered Medications   Medication Dose Frequency Provider Last Rate Last Dose   • potassium chloride SA (Kdur) tablet 40 mEq  40 mEq BID SANDI ShipmanO.   40 mEq at 03/06/17 1940   • furosemide (LASIX) injection 20 mg  20 mg Q DAY SANDI ShipmanOJoaquin   20 mg at 03/06/17 0835   • pantoprazole (PROTONIX) injection 40 mg  40 mg BID Wendy Nguyen M.D.   40 mg at 03/06/17 2015   • levothyroxine (SYNTHROID) tablet 112 mcg  112 mcg AM ES MOOSE Shipman.O.   112 mcg at 03/07/17 0507   • amlodipine (NORVASC) tablet 10 mg  10 mg Q DAY SANDI ShipmanO.   10 mg at 03/06/17 1207   • nystatin (MYCOSTATIN) powder   TID Martin Kerr M.D.   1,500,000 Units at 03/06/17 1940   • folic acid (FOLVITE) tablet 1 mg  1 mg DAILY Martin Kerr M.D.   1 mg at 03/06/17 0900   • labetalol (NORMODYNE,TRANDATE) injection 10 mg  10 mg Q6HRS PRN Heriberto Bond M.D.   10 mg at 03/04/17 2052   • pneumococcal vaccine (PNEUMOVAX-23) injection 25 mcg  0.5 mL Once PRN George Walton M.D.       • senna-docusate (PERICOLACE or SENOKOT S) 8.6-50 MG per tablet 2 Tab  2 Tab BID Elma Fitch D.O.   2 Tab at 03/06/17 1940    And   • polyethylene  "glycol/lytes (MIRALAX) PACKET 1 Packet  1 Packet QDAY PRN Elma Fitch D.O.        And   • magnesium hydroxide (MILK OF MAGNESIA) suspension 30 mL  30 mL QDAY PRN Elma Fitch D.O.        And   • bisacodyl (DULCOLAX) suppository 10 mg  10 mg QDAY PRN Elma Fitch D.O.       • acetaminophen (TYLENOL) tablet 650 mg  650 mg Q6HRS PRN SANDI DuranO.   650 mg at 03/06/17 1940   • ondansetron (ZOFRAN) syringe/vial injection 4 mg  4 mg Q4HRS PRN Elma Fitch D.O.       • ondansetron (ZOFRAN ODT) dispertab 4 mg  4 mg Q4HRS PRN Elma Fitch D.O.         Last reviewed on 3/2/2017 11:46 AM by Wendy Sethi RBABATUNDE.    Current Outpatient Medications:  Prescriptions prior to admission   Medication Sig Dispense Refill Last Dose   • moexipril (UNIVASC) 15 MG Tab Take 15 mg by mouth every evening.   2/28/2017 at pm   • levothyroxine (SYNTHROID) 112 MCG Tab Take 112 mcg by mouth Every morning on an empty stomach.   2/28/2017 at am       Medication Allergy:  Allergies   Allergen Reactions   • Codeine        Physical Exam:  Weight/BMI: Body mass index is 24.64 kg/(m^2).  Blood pressure 132/79, pulse 76, temperature 36.6 °C (97.8 °F), resp. rate 16, height 1.727 m (5' 7.99\"), weight 73.5 kg (162 lb 0.6 oz), SpO2 94 %, not currently breastfeeding.  Filed Vitals:    03/06/17 1620 03/06/17 1700 03/06/17 2009 03/07/17 0410   BP: 115/72  123/73 132/79   Pulse: 79  80 76   Temp: 36.6 °C (97.9 °F)  36.3 °C (97.4 °F) 36.6 °C (97.8 °F)   Resp: 17  16 16   Height:       Weight:       SpO2: 94% 94% 93% 94%     Oxygen Therapy:  Pulse Oximetry: 94 %, O2 (LPM): 1, O2 Delivery: Nasal Cannula    Intake/Output Summary (Last 24 hours) at 03/07/17 0703  Last data filed at 03/07/17 0410   Gross per 24 hour   Intake   1090 ml   Output      0 ml   Net   1090 ml       Constitutional:   Well developed, well nourished, no acute distress  HEENT:  Normocephalic, Atraumatic, Conjunctiva not pale, Sclera not icteric, Oropharynx moist mucous " membranes, No oral exudates, Nose normal.  No thyromegaly.  Neck:  Normal range of motion, No cervical tenderness,  no JVD.  Chest/Lungs:  Symmetric expansion, no spider angioma, breath sounds clear to auscultation bilaterally,  no crackles, no wheezing.   Cardiovascular:  Normal heart rate, Normal rhythm, No murmurs, No rubs, No gallops.    Abdomen: Bowel sounds normal, Soft, No tenderness, No guarding, No rebound, No masses, No hepatosplenomegaly.  Extremities: No cyanosis/clubbing/edema/palmar erythema/flapping tremor  Skin: Warm, Dry, No erythema, No rash, no induration.    MDM (Data Review):     Records reviewed and summarized in current documentation    Lab Data Review:  No results found for this or any previous visit (from the past 24 hour(s)).      Imaging/Procedures Review:        MDM (Assessment and Plan):     Active Hospital Problems    Diagnosis   • Shock circulatory (CMS-HCC) [R57.9]     Priority: High   • Acute respiratory failure with hypoxia (CMS-[facility]) [J96.01]     Priority: Medium   • ARF (acute renal failure) (CMS-[facility]) [N17.9]     Priority: Medium   • GI bleed [K92.2]     Priority: Medium   • Pulmonary edema [J81.1]   • Duodenal mass [K31.89]   • Hypotension [I95.9]   • Symptomatic anemia [D64.9]   • Anemia due to blood loss, acute [D62]   • HTN (hypertension) [I10]   • Hypothyroid [E03.9]   • Lactic acidosis [E87.2]   • Hypocalcemia [E83.51]   • Elevated troponin [R79.89]         Assessment  1.  Duodenal ulcer bleeding s/p IR emzolization  2.  Hematochezia - resolved  3.  Hypertension  4.  Abdominal pain, generalized - Resolved  5.  Nausea and vomiting - Resolved  6.  Renal insufficiency  7.  Coagulopathy  8.  Hypertension  9.  Hypothyroidism    Plan  1. A letter will be sent regarding to the biopsy result in about 2 weeks. (currently gastritis, pending H pylori)  2. Continue GI soft  3. PPI BIDAC  4. Avoid NSAIDs  5. Tx H pylori if positive on gastric biopsy  6. Monitor H/H and vitals.  7. Ok to  D/C home once H/H is stable and tolerating diet  8. F/U with Dr. Maharaj in 4 weeks.      Thank you very much for allowing me to participate in the care of your patient.  Please feel free to contact me anytime at [phone].     Wendy Nguyen M.D.

## 2017-03-07 NOTE — PROGRESS NOTES
Patient is alert and oriented to person, place, time, and situation.  Ambulates with a standby assist and a front wheel walker.  Tolerating diet.  Bed in lowest position, non slip socks on, and call light within reach.

## 2017-03-07 NOTE — FACE TO FACE
Face to Face Note  -  Durable Medical Equipment    Elma Fitch D.O. - NPI: [id number]  I certify that this patient is under my care and that they had a durable medical equipment(DME)face to face encounter by myself that meets the physician DME face-to-face encounter requirements with this patient on:    Date of encounter:   Patient:                    MRN:                       YOB: 2019  Shannon Balbuena  [id number]  1950     The encounter with the patient was in whole, or in part, for the following medical condition, which is the primary reason for durable medical equipment:  Other - GI bleed    I certify that, based on my findings, the following durable medical equipment is medically necessary:  Walkers.    HOME O2 Saturation Measurements:(Values must be present for Home Oxygen orders)         ,     ,         My Clinical findings support the need for the above equipment due to:  Abnormal Gait    Supporting Symptoms: generalized weakness

## 2017-03-07 NOTE — PROGRESS NOTES
Assumed care of pt at 0700  Awake, A&O x4  Up to chair x1 assist  Tolerating diet  Denies pain, nausea  On 1L NC, no home 02, will try to wean off oxygen  Voided once, post bhardwaj removal  IV to Lt EJ, saline lock  Gait usteady, uses walker to ambulate  Discussed POC with pt

## 2017-03-07 NOTE — CARE PLAN
Problem: Safety  Goal: Will remain free from falls  Outcome: PROGRESSING AS EXPECTED  Patient resting in bed with call light in reach.      Problem: Pain Management  Goal: Pain level will decrease to patient’s comfort goal  Outcome: PROGRESSING AS EXPECTED  Patient advised to request prn pain medication as needed for pain management.

## 2017-03-08 NOTE — DISCHARGE SUMMARY
DATE OF ADMISSION:  03/01/2017    DATE OF DISCHARGE:  03/07/2017    PRIMARY CARE PROVIDER:  Not listed.    CONSULTANTS:  Surgeon, Dr. Raj Leonard; GI, Dr. Bishop Maharaj; critical   intensivist, Dr. George Walton.    PROCEDURES COMPLETED:    1.  Central line placement by Dr. Stephanie Bradley for hypotension with   hemorrhagic shock requiring pressor support.    2.  Endotracheal intubation for respiratory failure by Dr. Martin Kerr on   03/01/2017.    3.  Central venous catheter insertion for hypovolemic shock by Dr. George Walton.    4.  Endoscopy was attempted.  Hemostasis with submucosal injection of   epinephrine by Dr. Maharaj on 03/01/2017 secondary to massive GI hemorrhage.      5.  A-line insertion by Dr. George Walton secondary to GI bleed with   hypotension.    6.  Right CFA puncture with celiac angiogram, common hepatic angiogram, GDA   coil embolization, SMA angio, right iliac and femoral angiogram with   satisfactory GDA occlusion with no persistent extravasation by Dr. Timmy Shane on 03/01/2017.    7.  Bronchoscopy on 03/02/2017 by Dr. George Walton secondary to respiratory   failure.    8.  Endoscopy follow up on 03/06/2017 by Dr. Wendy Nguyen with noted hiatal   hernia as well as patchy gastritis, status post biopsy.    CHIEF COMPLAINT ON ADMISSION:  Generalized weakness with massive rectal   bleeding, symptomatic anemia.    DISCHARGE DIAGNOSES:  1.  Massive gastrointestinal hemorrhage requiring transfusion secondary to   massive duodenal bleeding from GDA gastroduodenal artery.  2.  Gastritis.  3.  Hemorrhagic shock.  4.  Respiratory failure, resolved.  5.  Symptomatic anemia.  6.  Generalized weakness with debility requiring use of a walker on discharge.  7.  Acute renal failure, improving secondary to hypotension.  8.  Hypertension.  9.  Hypothyroidism.  10.  Lactic acidosis, resolved.  11.  Hypokalemia.  12.  Non-ST elevation myocardial infarction secondary to type 2 ischemia.    DISCHARGE  MEDICATIONS:  1.  Univasc 15 mg p.o. every evening.  2.  Norvasc 10 mg daily.  3.  Nystatin powder 1 application to affected area 3 times a day.  4.  Folic acid 1 mg daily.  5.  K-Dur 20 mEq p.o. b.i.d.  6.  Synthroid 112 mcg daily.  7.  Prilosec 20 mg p.o. b.i.d.    HOSPITAL COURSE:  Patient is a 66-year-old female with known history of   hypertension as well as hypothyroidism, presents with complaints of   generalized weakness and melanotic stool with symptomatic anemia.  Patient was   noted to be significantly anemic on initial evaluation and the patient did   require transfusion.  Patient continues to be hypotensive with a hemoglobin of   4.3.  The patient did receive up to 5 units of PRBC transfusion on her   initial admission; however, continued to remain hypotensive with ongoing   melanotic stool.  Patient did require a central line placement with a pressor   support.  Patient was admitted to the intensive care unit and was seen by GI,   Dr. Maharaj.  Patient did decompensate rapidly and did require an emergent   endoscopy for attempted hemostasis by Dr. Maharaj with submucosal injection   of epinephrine.  Source was unclear if it was related to peptic ulceration   versus neoplastic process with erosion into the duodenal bulb.  There was poor   visualization secondary to active bleeding.  Patient was emergently taken to   the interventional radiologist and did have an angiogram completed with   satisfactory gastric duodenal artery occlusion with no persisting   extravasation by Dr. Shane on 03/01/2017.  Prior to this, the patient did   develop hemorrhagic shock with associated respiratory failure requiring   intubation.  Post procedure, patient did have a bronchoscopy completed by Dr. Walton and for respiratory failure with noted purulent thick secretions with   some associated mucous plugging, patient's respiratory symptoms did resolve.    Patient was eventually extubated.  Patient's H and H remained  stable.  Patient   was started on octreotide as well as Protonix.  On initial evaluation, the   patient did not have any further bleed during this admission.  The patient's H   and H has remained stable.  Patient did have a repeat endoscopy by Dr. Nguyen   on 03/06/2017 was noted patchy gastritis and a 4 cm hiatal hernia.  Biopsy was   completed to rule out H. pylori as well as possible underlying necrotic   lesion and malignancy, concern was for possible necrotic tumor evolving from   vessel, ulcerating into vessel, rarely mycotic vascular aneurysm, breakdown on   vessel integrity.  At this point, patient has been transitioned to p.o.   proton pump inhibitor.  Patient has been cleared for discharge by GI and to   follow up in 4 weeks.  We have advised patient to avoid NSAIDs as well as   alcohol.  Patient is ambulatory with minimal generalized weakness.  Patient   will benefit from ambulatory assistance with a walker.  Of note, , patient did   develop acute renal failure on initial admission due to likely acute tubular   necrosis from hypotension, which has resolved.  Patient was also noted to have   elevated troponin likely type 2 ischemia secondary to associated hypotension.    Patient did receive aggressive fluid resuscitation.  Upon improvement of   symptoms patient has been started on Lasix diuresis.  At this point, patient's   renal function has returned to baseline.  We will discontinue Lasix diuresis.    Patient will be started on home blood pressure medication as well as   Norvasc.  The patient has been cleared for discharge to home to follow up as   an outpatient with GI for H. pylori biopsy result follow up.  Patient is   tolerating oral diet.    DISPOSITION:  To home with a walker.    CONDITION:  Fair.    FOLLOWUP INSTRUCTIONS:  With primary care provider in 1 week.  Avoid NSAIDs as   well as alcohol.  Follow up with Dr. Maharaj in 4 weeks.  Letter regarding   H. pylori biopsy results will be sent to  patient in 2 weeks.    CODE STATUS:  Full code.    Total discharge preparation time is 45 minutes.       ____________________________________     KAROL SHARP DO    EN / MAN    DD:  03/07/2017 14:29:36  DT:  03/07/2017 22:20:07    D#:  [id number]  Job#:  [id number]

## 2017-03-08 NOTE — PROGRESS NOTES
"Significant other at bedside, informed about pt discharge to home with walker per MD order. S/o not happy, and concerned about pt going home, and will the pt be able to care for self. Discussed with s/o that PT recommends walker, pt is minimal stand by assist. S/o would like to talk to attending  Physician. Dr. Little Wills paged and informed about situation. Md states she's not available to talk until 2 hrs from now. S/o is not willing to wait for the Dr and states \"Oh that's BS, just get us out of here\". FWW was ordered to traction.   "

## 2017-03-08 NOTE — PROGRESS NOTES
Pt is discharge to home per MD order. AVS and prescription provided. Discussed home meds, f/u appointment, activity, diet to pt and s/o. They both verbalized understanding. FWW was delivered in room and adjusted by traction staff. IV to lt EJ removed covered with 2x2 and tegaderm. Instructed to remove tomorrow and can shower. Left the unit via wheelchair, accompanied by staff RN.

## 2017-06-28 NOTE — ADDENDUM NOTE
Encounter addended by: Haylie Moran R.N. on: 6/28/2017  1:46 PM<BR>     Documentation filed: Inpatient Document Flowsheet

## 2018-09-21 ENCOUNTER — HOSPITAL ENCOUNTER (INPATIENT)
Facility: MEDICAL CENTER | Age: 68
LOS: 5 days | DRG: 378 | End: 2018-09-26
Attending: EMERGENCY MEDICINE | Admitting: INTERNAL MEDICINE
Payer: COMMERCIAL

## 2018-09-21 ENCOUNTER — APPOINTMENT (OUTPATIENT)
Dept: RADIOLOGY | Facility: MEDICAL CENTER | Age: 68
DRG: 378 | End: 2018-09-21
Attending: EMERGENCY MEDICINE
Payer: COMMERCIAL

## 2018-09-21 DIAGNOSIS — E43 SEVERE PROTEIN-CALORIE MALNUTRITION (HCC): ICD-10-CM

## 2018-09-21 DIAGNOSIS — D64.9 ANEMIA, UNSPECIFIED TYPE: ICD-10-CM

## 2018-09-21 DIAGNOSIS — N17.9 AKI (ACUTE KIDNEY INJURY) (HCC): ICD-10-CM

## 2018-09-21 DIAGNOSIS — K92.2 UPPER GI BLEED: ICD-10-CM

## 2018-09-21 DIAGNOSIS — D62 ANEMIA DUE TO BLOOD LOSS, ACUTE: ICD-10-CM

## 2018-09-21 DIAGNOSIS — R53.81 PHYSICAL DECONDITIONING: ICD-10-CM

## 2018-09-21 PROBLEM — R94.31 PROLONGED Q-T INTERVAL ON ECG: Status: ACTIVE | Noted: 2018-09-21

## 2018-09-21 PROBLEM — D75.89 MACROCYTOSIS: Status: ACTIVE | Noted: 2018-09-21

## 2018-09-21 PROBLEM — Z78.9 ALCOHOL USE: Status: ACTIVE | Noted: 2018-09-21

## 2018-09-21 PROBLEM — B18.2 CHRONIC HEPATITIS C VIRUS INFECTION (HCC): Status: ACTIVE | Noted: 2018-09-21

## 2018-09-21 LAB
ABO GROUP BLD: NORMAL
ALBUMIN SERPL BCP-MCNC: 3.7 G/DL (ref 3.2–4.9)
ALP SERPL-CCNC: 72 U/L (ref 30–99)
ALT SERPL-CCNC: 9 U/L (ref 2–50)
ANION GAP SERPL CALC-SCNC: 28 MMOL/L (ref 0–11.9)
APTT PPP: 23.1 SEC (ref 24.7–36)
AST SERPL-CCNC: 20 U/L (ref 12–45)
BARCODED ABORH UBTYP: 1700
BARCODED ABORH UBTYP: 1700
BARCODED PRD CODE UBPRD: NORMAL
BARCODED PRD CODE UBPRD: NORMAL
BARCODED UNIT NUM UBUNT: NORMAL
BARCODED UNIT NUM UBUNT: NORMAL
BASOPHILS # BLD AUTO: 0.5 % (ref 0–1.8)
BASOPHILS # BLD: 0.05 K/UL (ref 0–0.12)
BILIRUB CONJ SERPL-MCNC: 0.3 MG/DL (ref 0.1–0.5)
BILIRUB INDIRECT SERPL-MCNC: 0.4 MG/DL (ref 0–1)
BILIRUB SERPL-MCNC: 0.7 MG/DL (ref 0.1–1.5)
BLD GP AB SCN SERPL QL: NORMAL
BUN SERPL-MCNC: 67 MG/DL (ref 8–22)
CALCIUM SERPL-MCNC: 9.2 MG/DL (ref 8.5–10.5)
CFT BLD TEG: 4.1 MIN (ref 5–10)
CHLORIDE SERPL-SCNC: 103 MMOL/L (ref 96–112)
CLOT ANGLE BLD TEG: 73.9 DEGREES (ref 53–72)
CLOT LYSIS 30M P MA LENFR BLD TEG: 11.9 % (ref 0–8)
CO2 SERPL-SCNC: 12 MMOL/L (ref 20–33)
COMPONENT R 8504R: NORMAL
COMPONENT R 8504R: NORMAL
CREAT SERPL-MCNC: 1.45 MG/DL (ref 0.5–1.4)
CT.EXTRINSIC BLD ROTEM: 1.1 MIN (ref 1–3)
EKG IMPRESSION: NORMAL
EOSINOPHIL # BLD AUTO: 0.01 K/UL (ref 0–0.51)
EOSINOPHIL NFR BLD: 0.1 % (ref 0–6.9)
ERYTHROCYTE [DISTWIDTH] IN BLOOD BY AUTOMATED COUNT: 62.4 FL (ref 35.9–50)
ETHANOL BLD-MCNC: 0 G/DL
FOLATE SERPL-MCNC: 4.5 NG/ML
GLUCOSE SERPL-MCNC: 139 MG/DL (ref 65–99)
HCT VFR BLD AUTO: 18.4 % (ref 37–47)
HGB BLD-MCNC: 5.7 G/DL (ref 12–16)
IMM GRANULOCYTES # BLD AUTO: 0.07 K/UL (ref 0–0.11)
IMM GRANULOCYTES NFR BLD AUTO: 0.7 % (ref 0–0.9)
INR PPP: 1.14 (ref 0.87–1.13)
IRON SATN MFR SERPL: 22 % (ref 15–55)
IRON SATN MFR SERPL: 35 % (ref 15–55)
IRON SERPL-MCNC: 129 UG/DL (ref 40–170)
IRON SERPL-MCNC: 89 UG/DL (ref 40–170)
LACTATE BLD-SCNC: 1.8 MMOL/L (ref 0.5–2)
LYMPHOCYTES # BLD AUTO: 0.95 K/UL (ref 1–4.8)
LYMPHOCYTES NFR BLD: 9.5 % (ref 22–41)
MAGNESIUM SERPL-MCNC: 1.6 MG/DL (ref 1.5–2.5)
MCF BLD TEG: 69.5 MM (ref 50–70)
MCH RBC QN AUTO: 36.3 PG (ref 27–33)
MCHC RBC AUTO-ENTMCNC: 31 G/DL (ref 33.6–35)
MCV RBC AUTO: 117.2 FL (ref 81.4–97.8)
MONOCYTES # BLD AUTO: 1 K/UL (ref 0–0.85)
MONOCYTES NFR BLD AUTO: 10 % (ref 0–13.4)
NEUTROPHILS # BLD AUTO: 7.94 K/UL (ref 2–7.15)
NEUTROPHILS NFR BLD: 79.2 % (ref 44–72)
NRBC # BLD AUTO: 0.06 K/UL
NRBC BLD-RTO: 0.6 /100 WBC
PA AA BLD-ACNC: 2.8 %
PA ADP BLD-ACNC: 2.6 %
PLATELET # BLD AUTO: 450 K/UL (ref 164–446)
PMV BLD AUTO: 9.6 FL (ref 9–12.9)
POTASSIUM SERPL-SCNC: 4.2 MMOL/L (ref 3.6–5.5)
PRODUCT TYPE UPROD: NORMAL
PRODUCT TYPE UPROD: NORMAL
PROT SERPL-MCNC: 7 G/DL (ref 6–8.2)
PROTHROMBIN TIME: 14.7 SEC (ref 12–14.6)
RBC # BLD AUTO: 1.57 M/UL (ref 4.2–5.4)
RH BLD: NORMAL
SODIUM SERPL-SCNC: 143 MMOL/L (ref 135–145)
TEG ALGORITHM TGALG: ABNORMAL
TIBC SERPL-MCNC: 371 UG/DL (ref 250–450)
TIBC SERPL-MCNC: 396 UG/DL (ref 250–450)
TROPONIN I SERPL-MCNC: 0.02 NG/ML (ref 0–0.04)
TSH SERPL DL<=0.005 MIU/L-ACNC: 4.77 UIU/ML (ref 0.38–5.33)
UNIT STATUS USTAT: NORMAL
UNIT STATUS USTAT: NORMAL
VIT B12 SERPL-MCNC: 412 PG/ML (ref 211–911)
WBC # BLD AUTO: 10 K/UL (ref 4.8–10.8)

## 2018-09-21 PROCEDURE — 700111 HCHG RX REV CODE 636 W/ 250 OVERRIDE (IP): Performed by: STUDENT IN AN ORGANIZED HEALTH CARE EDUCATION/TRAINING PROGRAM

## 2018-09-21 PROCEDURE — 85730 THROMBOPLASTIN TIME PARTIAL: CPT

## 2018-09-21 PROCEDURE — 71045 X-RAY EXAM CHEST 1 VIEW: CPT

## 2018-09-21 PROCEDURE — 83735 ASSAY OF MAGNESIUM: CPT

## 2018-09-21 PROCEDURE — 70450 CT HEAD/BRAIN W/O DYE: CPT

## 2018-09-21 PROCEDURE — 86900 BLOOD TYPING SEROLOGIC ABO: CPT

## 2018-09-21 PROCEDURE — 85347 COAGULATION TIME ACTIVATED: CPT

## 2018-09-21 PROCEDURE — 80048 BASIC METABOLIC PNL TOTAL CA: CPT

## 2018-09-21 PROCEDURE — C9113 INJ PANTOPRAZOLE SODIUM, VIA: HCPCS | Performed by: EMERGENCY MEDICINE

## 2018-09-21 PROCEDURE — 700102 HCHG RX REV CODE 250 W/ 637 OVERRIDE(OP): Performed by: INTERNAL MEDICINE

## 2018-09-21 PROCEDURE — 85025 COMPLETE CBC W/AUTO DIFF WBC: CPT

## 2018-09-21 PROCEDURE — 80307 DRUG TEST PRSMV CHEM ANLYZR: CPT

## 2018-09-21 PROCEDURE — 770022 HCHG ROOM/CARE - ICU (200)

## 2018-09-21 PROCEDURE — 700111 HCHG RX REV CODE 636 W/ 250 OVERRIDE (IP): Performed by: EMERGENCY MEDICINE

## 2018-09-21 PROCEDURE — 86923 COMPATIBILITY TEST ELECTRIC: CPT

## 2018-09-21 PROCEDURE — 84443 ASSAY THYROID STIM HORMONE: CPT

## 2018-09-21 PROCEDURE — 82746 ASSAY OF FOLIC ACID SERUM: CPT

## 2018-09-21 PROCEDURE — 94760 N-INVAS EAR/PLS OXIMETRY 1: CPT

## 2018-09-21 PROCEDURE — 86901 BLOOD TYPING SEROLOGIC RH(D): CPT

## 2018-09-21 PROCEDURE — 93005 ELECTROCARDIOGRAM TRACING: CPT | Performed by: EMERGENCY MEDICINE

## 2018-09-21 PROCEDURE — 85384 FIBRINOGEN ACTIVITY: CPT

## 2018-09-21 PROCEDURE — 83605 ASSAY OF LACTIC ACID: CPT

## 2018-09-21 PROCEDURE — HZ2ZZZZ DETOXIFICATION SERVICES FOR SUBSTANCE ABUSE TREATMENT: ICD-10-PCS | Performed by: INTERNAL MEDICINE

## 2018-09-21 PROCEDURE — 700105 HCHG RX REV CODE 258: Performed by: INTERNAL MEDICINE

## 2018-09-21 PROCEDURE — A9270 NON-COVERED ITEM OR SERVICE: HCPCS | Performed by: INTERNAL MEDICINE

## 2018-09-21 PROCEDURE — 36430 TRANSFUSION BLD/BLD COMPNT: CPT

## 2018-09-21 PROCEDURE — 99291 CRITICAL CARE FIRST HOUR: CPT

## 2018-09-21 PROCEDURE — 85610 PROTHROMBIN TIME: CPT

## 2018-09-21 PROCEDURE — 99223 1ST HOSP IP/OBS HIGH 75: CPT | Performed by: INTERNAL MEDICINE

## 2018-09-21 PROCEDURE — 700105 HCHG RX REV CODE 258: Performed by: EMERGENCY MEDICINE

## 2018-09-21 PROCEDURE — 86850 RBC ANTIBODY SCREEN: CPT

## 2018-09-21 PROCEDURE — 82607 VITAMIN B-12: CPT

## 2018-09-21 PROCEDURE — P9016 RBC LEUKOCYTES REDUCED: HCPCS

## 2018-09-21 PROCEDURE — 30233N1 TRANSFUSION OF NONAUTOLOGOUS RED BLOOD CELLS INTO PERIPHERAL VEIN, PERCUTANEOUS APPROACH: ICD-10-PCS | Performed by: INTERNAL MEDICINE

## 2018-09-21 PROCEDURE — 84484 ASSAY OF TROPONIN QUANT: CPT

## 2018-09-21 PROCEDURE — 85576 BLOOD PLATELET AGGREGATION: CPT | Mod: 91

## 2018-09-21 PROCEDURE — 83550 IRON BINDING TEST: CPT | Mod: 91

## 2018-09-21 PROCEDURE — 80076 HEPATIC FUNCTION PANEL: CPT

## 2018-09-21 PROCEDURE — 36415 COLL VENOUS BLD VENIPUNCTURE: CPT

## 2018-09-21 PROCEDURE — 83540 ASSAY OF IRON: CPT

## 2018-09-21 RX ORDER — LORAZEPAM 1 MG/1
2 TABLET ORAL
Status: DISCONTINUED | OUTPATIENT
Start: 2018-09-21 | End: 2018-09-24

## 2018-09-21 RX ORDER — POLYETHYLENE GLYCOL 3350 17 G/17G
1 POWDER, FOR SOLUTION ORAL
Status: DISCONTINUED | OUTPATIENT
Start: 2018-09-21 | End: 2018-09-26 | Stop reason: HOSPADM

## 2018-09-21 RX ORDER — CARVEDILOL 12.5 MG/1
12.5 TABLET ORAL 2 TIMES DAILY
Status: DISCONTINUED | OUTPATIENT
Start: 2018-09-21 | End: 2018-09-22

## 2018-09-21 RX ORDER — LORAZEPAM 2 MG/ML
1.5 INJECTION INTRAMUSCULAR
Status: DISCONTINUED | OUTPATIENT
Start: 2018-09-21 | End: 2018-09-24

## 2018-09-21 RX ORDER — LORAZEPAM 1 MG/1
4 TABLET ORAL
Status: DISCONTINUED | OUTPATIENT
Start: 2018-09-21 | End: 2018-09-24

## 2018-09-21 RX ORDER — SODIUM CHLORIDE 9 MG/ML
INJECTION, SOLUTION INTRAVENOUS CONTINUOUS
Status: DISCONTINUED | OUTPATIENT
Start: 2018-09-21 | End: 2018-09-21

## 2018-09-21 RX ORDER — SODIUM CHLORIDE, SODIUM LACTATE, POTASSIUM CHLORIDE, CALCIUM CHLORIDE 600; 310; 30; 20 MG/100ML; MG/100ML; MG/100ML; MG/100ML
INJECTION, SOLUTION INTRAVENOUS CONTINUOUS
Status: DISCONTINUED | OUTPATIENT
Start: 2018-09-21 | End: 2018-09-22

## 2018-09-21 RX ORDER — FOLIC ACID 1 MG/1
1 TABLET ORAL DAILY
Status: DISCONTINUED | OUTPATIENT
Start: 2018-09-21 | End: 2018-09-26 | Stop reason: HOSPADM

## 2018-09-21 RX ORDER — LORAZEPAM 1 MG/1
3 TABLET ORAL
Status: DISCONTINUED | OUTPATIENT
Start: 2018-09-21 | End: 2018-09-21

## 2018-09-21 RX ORDER — DIAZEPAM 5 MG/1
5 TABLET ORAL EVERY 6 HOURS PRN
Status: DISCONTINUED | OUTPATIENT
Start: 2018-09-21 | End: 2018-09-21

## 2018-09-21 RX ORDER — LORAZEPAM 1 MG/1
0.5 TABLET ORAL EVERY 4 HOURS PRN
Status: DISCONTINUED | OUTPATIENT
Start: 2018-09-21 | End: 2018-09-21

## 2018-09-21 RX ORDER — LORAZEPAM 2 MG/ML
1.5 INJECTION INTRAMUSCULAR
Status: DISCONTINUED | OUTPATIENT
Start: 2018-09-21 | End: 2018-09-21

## 2018-09-21 RX ORDER — LORAZEPAM 1 MG/1
0.5 TABLET ORAL EVERY 4 HOURS PRN
Status: DISCONTINUED | OUTPATIENT
Start: 2018-09-21 | End: 2018-09-24

## 2018-09-21 RX ORDER — LORAZEPAM 2 MG/ML
1 INJECTION INTRAMUSCULAR
Status: DISCONTINUED | OUTPATIENT
Start: 2018-09-21 | End: 2018-09-21

## 2018-09-21 RX ORDER — SODIUM CHLORIDE 9 MG/ML
1000 INJECTION, SOLUTION INTRAVENOUS ONCE
Status: COMPLETED | OUTPATIENT
Start: 2018-09-21 | End: 2018-09-21

## 2018-09-21 RX ORDER — BISACODYL 10 MG
10 SUPPOSITORY, RECTAL RECTAL
Status: DISCONTINUED | OUTPATIENT
Start: 2018-09-21 | End: 2018-09-26 | Stop reason: HOSPADM

## 2018-09-21 RX ORDER — LORAZEPAM 2 MG/ML
0.5 INJECTION INTRAMUSCULAR EVERY 4 HOURS PRN
Status: DISCONTINUED | OUTPATIENT
Start: 2018-09-21 | End: 2018-09-21

## 2018-09-21 RX ORDER — MAGNESIUM SULFATE HEPTAHYDRATE 40 MG/ML
2 INJECTION, SOLUTION INTRAVENOUS ONCE
Status: COMPLETED | OUTPATIENT
Start: 2018-09-21 | End: 2018-09-21

## 2018-09-21 RX ORDER — LORAZEPAM 1 MG/1
2 TABLET ORAL
Status: DISCONTINUED | OUTPATIENT
Start: 2018-09-21 | End: 2018-09-21

## 2018-09-21 RX ORDER — LORAZEPAM 2 MG/ML
2 INJECTION INTRAMUSCULAR
Status: DISCONTINUED | OUTPATIENT
Start: 2018-09-21 | End: 2018-09-21

## 2018-09-21 RX ORDER — LORAZEPAM 1 MG/1
4 TABLET ORAL
Status: DISCONTINUED | OUTPATIENT
Start: 2018-09-21 | End: 2018-09-21

## 2018-09-21 RX ORDER — LORAZEPAM 2 MG/ML
2 INJECTION INTRAMUSCULAR
Status: DISCONTINUED | OUTPATIENT
Start: 2018-09-21 | End: 2018-09-24

## 2018-09-21 RX ORDER — AMOXICILLIN 250 MG
2 CAPSULE ORAL 2 TIMES DAILY
Status: DISCONTINUED | OUTPATIENT
Start: 2018-09-22 | End: 2018-09-26 | Stop reason: HOSPADM

## 2018-09-21 RX ORDER — CYANOCOBALAMIN 1000 UG/ML
1000 INJECTION, SOLUTION INTRAMUSCULAR; SUBCUTANEOUS DAILY
Status: COMPLETED | OUTPATIENT
Start: 2018-09-22 | End: 2018-09-23

## 2018-09-21 RX ORDER — LORAZEPAM 1 MG/1
3 TABLET ORAL
Status: DISCONTINUED | OUTPATIENT
Start: 2018-09-21 | End: 2018-09-24

## 2018-09-21 RX ORDER — LORAZEPAM 1 MG/1
1 TABLET ORAL EVERY 4 HOURS PRN
Status: DISCONTINUED | OUTPATIENT
Start: 2018-09-21 | End: 2018-09-21

## 2018-09-21 RX ORDER — LORAZEPAM 2 MG/ML
0.5 INJECTION INTRAMUSCULAR EVERY 4 HOURS PRN
Status: DISCONTINUED | OUTPATIENT
Start: 2018-09-21 | End: 2018-09-24

## 2018-09-21 RX ORDER — LABETALOL HYDROCHLORIDE 5 MG/ML
10 INJECTION, SOLUTION INTRAVENOUS
Status: DISCONTINUED | OUTPATIENT
Start: 2018-09-21 | End: 2018-09-26 | Stop reason: HOSPADM

## 2018-09-21 RX ORDER — LORAZEPAM 2 MG/ML
1 INJECTION INTRAMUSCULAR
Status: DISCONTINUED | OUTPATIENT
Start: 2018-09-21 | End: 2018-09-24

## 2018-09-21 RX ORDER — LORAZEPAM 1 MG/1
1 TABLET ORAL EVERY 4 HOURS PRN
Status: DISCONTINUED | OUTPATIENT
Start: 2018-09-21 | End: 2018-09-24

## 2018-09-21 RX ORDER — LEVOTHYROXINE SODIUM 112 UG/1
112 TABLET ORAL
Status: DISCONTINUED | OUTPATIENT
Start: 2018-09-22 | End: 2018-09-26 | Stop reason: HOSPADM

## 2018-09-21 RX ORDER — THIAMINE MONONITRATE (VIT B1) 100 MG
100 TABLET ORAL DAILY
Status: DISCONTINUED | OUTPATIENT
Start: 2018-09-21 | End: 2018-09-26 | Stop reason: HOSPADM

## 2018-09-21 RX ADMIN — FOLIC ACID 1 MG: 1 TABLET ORAL at 21:56

## 2018-09-21 RX ADMIN — SODIUM CHLORIDE 8 MG/HR: 9 INJECTION, SOLUTION INTRAVENOUS at 21:57

## 2018-09-21 RX ADMIN — SODIUM CHLORIDE: 9 INJECTION, SOLUTION INTRAVENOUS at 20:15

## 2018-09-21 RX ADMIN — CARVEDILOL 12.5 MG: 12.5 TABLET, FILM COATED ORAL at 22:12

## 2018-09-21 RX ADMIN — SODIUM CHLORIDE 80 MG: 9 INJECTION, SOLUTION INTRAVENOUS at 21:57

## 2018-09-21 RX ADMIN — SODIUM CHLORIDE 1000 ML: 9 INJECTION, SOLUTION INTRAVENOUS at 19:20

## 2018-09-21 RX ADMIN — THIAMINE HCL TAB 100 MG 100 MG: 100 TAB at 21:56

## 2018-09-21 RX ADMIN — MAGNESIUM SULFATE HEPTAHYDRATE 2 G: 40 INJECTION, SOLUTION INTRAVENOUS at 21:57

## 2018-09-21 RX ADMIN — SODIUM CHLORIDE, POTASSIUM CHLORIDE, SODIUM LACTATE AND CALCIUM CHLORIDE: 600; 310; 30; 20 INJECTION, SOLUTION INTRAVENOUS at 21:56

## 2018-09-21 ASSESSMENT — LIFESTYLE VARIABLES
HEADACHE, FULLNESS IN HEAD: NOT PRESENT
NAUSEA AND VOMITING: NO NAUSEA AND NO VOMITING
ANXIETY: NO ANXIETY (AT EASE)
ORIENTATION AND CLOUDING OF SENSORIUM: ORIENTED AND CAN DO SERIAL ADDITIONS
HAVE PEOPLE ANNOYED YOU BY CRITICIZING YOUR DRINKING: YES
TOTAL SCORE: 1
TOTAL SCORE: 1
HAVE YOU EVER FELT YOU SHOULD CUT DOWN ON YOUR DRINKING: NO
EVER HAD A DRINK FIRST THING IN THE MORNING TO STEADY YOUR NERVES TO GET RID OF A HANGOVER: NO
DO YOU DRINK ALCOHOL: YES
EVER FELT BAD OR GUILTY ABOUT YOUR DRINKING: NO
AGITATION: NORMAL ACTIVITY
TOTAL SCORE: 1
TOTAL SCORE: 2
TREMOR: *
CONSUMPTION TOTAL: INCOMPLETE
PAROXYSMAL SWEATS: NO SWEAT VISIBLE
VISUAL DISTURBANCES: NOT PRESENT
EVER_SMOKED: YES
AUDITORY DISTURBANCES: NOT PRESENT

## 2018-09-21 ASSESSMENT — ENCOUNTER SYMPTOMS
LOSS OF CONSCIOUSNESS: 0
FEVER: 0
VOMITING: 1
ROS SKIN COMMENTS: PALLOR
NAUSEA: 1
HEADACHES: 0
WEAKNESS: 1
PALPITATIONS: 0
DIZZINESS: 1
COUGH: 0
ABDOMINAL PAIN: 0

## 2018-09-21 ASSESSMENT — PAIN SCALES - GENERAL
PAINLEVEL_OUTOF10: 0

## 2018-09-21 ASSESSMENT — COPD QUESTIONNAIRES
DURING THE PAST 4 WEEKS HOW MUCH DID YOU FEEL SHORT OF BREATH: NONE/LITTLE OF THE TIME
HAVE YOU SMOKED AT LEAST 100 CIGARETTES IN YOUR ENTIRE LIFE: YES
COPD SCREENING SCORE: 4
DO YOU EVER COUGH UP ANY MUCUS OR PHLEGM?: NO/ONLY WITH OCCASIONAL COLDS OR INFECTIONS

## 2018-09-22 ENCOUNTER — APPOINTMENT (OUTPATIENT)
Dept: RADIOLOGY | Facility: MEDICAL CENTER | Age: 68
DRG: 378 | End: 2018-09-22
Attending: STUDENT IN AN ORGANIZED HEALTH CARE EDUCATION/TRAINING PROGRAM
Payer: COMMERCIAL

## 2018-09-22 ENCOUNTER — APPOINTMENT (OUTPATIENT)
Dept: RADIOLOGY | Facility: MEDICAL CENTER | Age: 68
DRG: 378 | End: 2018-09-22
Attending: INTERNAL MEDICINE
Payer: COMMERCIAL

## 2018-09-22 PROBLEM — I95.89 HYPOTENSION DUE TO BLOOD LOSS: Status: ACTIVE | Noted: 2017-03-04

## 2018-09-22 PROBLEM — R58 HYPOTENSION DUE TO BLOOD LOSS: Status: ACTIVE | Noted: 2017-03-04

## 2018-09-22 LAB
ANION GAP SERPL CALC-SCNC: 21 MMOL/L (ref 0–11.9)
BUN SERPL-MCNC: 59 MG/DL (ref 8–22)
CALCIUM SERPL-MCNC: 7.8 MG/DL (ref 8.5–10.5)
CHLORIDE SERPL-SCNC: 110 MMOL/L (ref 96–112)
CO2 SERPL-SCNC: 14 MMOL/L (ref 20–33)
CREAT SERPL-MCNC: 1.28 MG/DL (ref 0.5–1.4)
CREAT UR-MCNC: 103.1 MG/DL
EKG IMPRESSION: NORMAL
ERYTHROCYTE [DISTWIDTH] IN BLOOD BY AUTOMATED COUNT: 63.7 FL (ref 35.9–50)
GLUCOSE SERPL-MCNC: 89 MG/DL (ref 65–99)
HCT VFR BLD AUTO: 20.7 % (ref 37–47)
HCT VFR BLD AUTO: 23.2 % (ref 37–47)
HCT VFR BLD AUTO: 24.2 % (ref 37–47)
HCT VFR BLD AUTO: 24.3 % (ref 37–47)
HGB BLD-MCNC: 6.9 G/DL (ref 12–16)
HGB BLD-MCNC: 7.7 G/DL (ref 12–16)
HGB BLD-MCNC: 8.1 G/DL (ref 12–16)
HGB BLD-MCNC: 8.2 G/DL (ref 12–16)
MAGNESIUM SERPL-MCNC: 2.1 MG/DL (ref 1.5–2.5)
MCH RBC QN AUTO: 33 PG (ref 27–33)
MCHC RBC AUTO-ENTMCNC: 33.2 G/DL (ref 33.6–35)
MCV RBC AUTO: 99.6 FL (ref 81.4–97.8)
PHOSPHATE SERPL-MCNC: 4.4 MG/DL (ref 2.5–4.5)
PLATELET # BLD AUTO: 267 K/UL (ref 164–446)
PMV BLD AUTO: 9.5 FL (ref 9–12.9)
POTASSIUM SERPL-SCNC: 3.8 MMOL/L (ref 3.6–5.5)
RBC # BLD AUTO: 2.33 M/UL (ref 4.2–5.4)
SODIUM SERPL-SCNC: 145 MMOL/L (ref 135–145)
SODIUM UR-SCNC: 20 MMOL/L
WBC # BLD AUTO: 5.4 K/UL (ref 4.8–10.8)

## 2018-09-22 PROCEDURE — 51798 US URINE CAPACITY MEASURE: CPT

## 2018-09-22 PROCEDURE — 160002 HCHG RECOVERY MINUTES (STAT): Performed by: INTERNAL MEDICINE

## 2018-09-22 PROCEDURE — 93005 ELECTROCARDIOGRAM TRACING: CPT | Performed by: INTERNAL MEDICINE

## 2018-09-22 PROCEDURE — 88305 TISSUE EXAM BY PATHOLOGIST: CPT

## 2018-09-22 PROCEDURE — 700105 HCHG RX REV CODE 258: Performed by: ANESTHESIOLOGY

## 2018-09-22 PROCEDURE — B548ZZA ULTRASONOGRAPHY OF SUPERIOR VENA CAVA, GUIDANCE: ICD-10-PCS | Performed by: INTERNAL MEDICINE

## 2018-09-22 PROCEDURE — P9016 RBC LEUKOCYTES REDUCED: HCPCS

## 2018-09-22 PROCEDURE — 160009 HCHG ANES TIME/MIN: Performed by: INTERNAL MEDICINE

## 2018-09-22 PROCEDURE — 99291 CRITICAL CARE FIRST HOUR: CPT | Performed by: ANESTHESIOLOGY

## 2018-09-22 PROCEDURE — 700105 HCHG RX REV CODE 258: Performed by: STUDENT IN AN ORGANIZED HEALTH CARE EDUCATION/TRAINING PROGRAM

## 2018-09-22 PROCEDURE — 500066 HCHG BITE BLOCK, ECT: Performed by: INTERNAL MEDICINE

## 2018-09-22 PROCEDURE — 83735 ASSAY OF MAGNESIUM: CPT

## 2018-09-22 PROCEDURE — 700102 HCHG RX REV CODE 250 W/ 637 OVERRIDE(OP)

## 2018-09-22 PROCEDURE — 700111 HCHG RX REV CODE 636 W/ 250 OVERRIDE (IP)

## 2018-09-22 PROCEDURE — 160048 HCHG OR STATISTICAL LEVEL 1-5: Performed by: INTERNAL MEDICINE

## 2018-09-22 PROCEDURE — 84300 ASSAY OF URINE SODIUM: CPT

## 2018-09-22 PROCEDURE — 700105 HCHG RX REV CODE 258

## 2018-09-22 PROCEDURE — 71045 X-RAY EXAM CHEST 1 VIEW: CPT

## 2018-09-22 PROCEDURE — 80048 BASIC METABOLIC PNL TOTAL CA: CPT

## 2018-09-22 PROCEDURE — 700101 HCHG RX REV CODE 250: Performed by: INTERNAL MEDICINE

## 2018-09-22 PROCEDURE — 160035 HCHG PACU - 1ST 60 MINS PHASE I: Performed by: INTERNAL MEDICINE

## 2018-09-22 PROCEDURE — 700101 HCHG RX REV CODE 250

## 2018-09-22 PROCEDURE — 36430 TRANSFUSION BLD/BLD COMPNT: CPT

## 2018-09-22 PROCEDURE — 82570 ASSAY OF URINE CREATININE: CPT

## 2018-09-22 PROCEDURE — 93010 ELECTROCARDIOGRAM REPORT: CPT | Performed by: INTERNAL MEDICINE

## 2018-09-22 PROCEDURE — 700111 HCHG RX REV CODE 636 W/ 250 OVERRIDE (IP): Performed by: INTERNAL MEDICINE

## 2018-09-22 PROCEDURE — C1751 CATH, INF, PER/CENT/MIDLINE: HCPCS

## 2018-09-22 PROCEDURE — 86923 COMPATIBILITY TEST ELECTRIC: CPT

## 2018-09-22 PROCEDURE — 160203 HCHG ENDO MINUTES - 1ST 30 MINS LEVEL 4: Performed by: INTERNAL MEDICINE

## 2018-09-22 PROCEDURE — 700105 HCHG RX REV CODE 258: Performed by: INTERNAL MEDICINE

## 2018-09-22 PROCEDURE — 0DB98ZX EXCISION OF DUODENUM, VIA NATURAL OR ARTIFICIAL OPENING ENDOSCOPIC, DIAGNOSTIC: ICD-10-PCS | Performed by: INTERNAL MEDICINE

## 2018-09-22 PROCEDURE — 76700 US EXAM ABDOM COMPLETE: CPT

## 2018-09-22 PROCEDURE — 02HV33Z INSERTION OF INFUSION DEVICE INTO SUPERIOR VENA CAVA, PERCUTANEOUS APPROACH: ICD-10-PCS | Performed by: INTERNAL MEDICINE

## 2018-09-22 PROCEDURE — 36556 INSERT NON-TUNNEL CV CATH: CPT | Mod: LT | Performed by: INTERNAL MEDICINE

## 2018-09-22 PROCEDURE — 85014 HEMATOCRIT: CPT | Mod: 91

## 2018-09-22 PROCEDURE — 85018 HEMOGLOBIN: CPT | Mod: 91

## 2018-09-22 PROCEDURE — A9270 NON-COVERED ITEM OR SERVICE: HCPCS

## 2018-09-22 PROCEDURE — 770022 HCHG ROOM/CARE - ICU (200)

## 2018-09-22 PROCEDURE — 84100 ASSAY OF PHOSPHORUS: CPT

## 2018-09-22 PROCEDURE — C9113 INJ PANTOPRAZOLE SODIUM, VIA: HCPCS | Performed by: INTERNAL MEDICINE

## 2018-09-22 PROCEDURE — 85027 COMPLETE CBC AUTOMATED: CPT

## 2018-09-22 PROCEDURE — 36556 INSERT NON-TUNNEL CV CATH: CPT

## 2018-09-22 RX ORDER — SODIUM CHLORIDE 9 MG/ML
1000 INJECTION, SOLUTION INTRAVENOUS ONCE
Status: DISCONTINUED | OUTPATIENT
Start: 2018-09-22 | End: 2018-09-22

## 2018-09-22 RX ORDER — SODIUM CHLORIDE, SODIUM LACTATE, POTASSIUM CHLORIDE, CALCIUM CHLORIDE 600; 310; 30; 20 MG/100ML; MG/100ML; MG/100ML; MG/100ML
1000 INJECTION, SOLUTION INTRAVENOUS CONTINUOUS
Status: DISCONTINUED | OUTPATIENT
Start: 2018-09-22 | End: 2018-09-22

## 2018-09-22 RX ORDER — HALOPERIDOL 5 MG/ML
1 INJECTION INTRAMUSCULAR
Status: DISCONTINUED | OUTPATIENT
Start: 2018-09-22 | End: 2018-09-22 | Stop reason: HOSPADM

## 2018-09-22 RX ORDER — ONDANSETRON 2 MG/ML
4 INJECTION INTRAMUSCULAR; INTRAVENOUS ONCE
Status: COMPLETED | OUTPATIENT
Start: 2018-09-22 | End: 2018-09-22

## 2018-09-22 RX ORDER — NOREPINEPHRINE BITARTRATE 1 MG/ML
INJECTION, SOLUTION INTRAVENOUS
Status: COMPLETED
Start: 2018-09-22 | End: 2018-09-22

## 2018-09-22 RX ORDER — SODIUM CHLORIDE 9 MG/ML
INJECTION, SOLUTION INTRAVENOUS CONTINUOUS
Status: DISCONTINUED | OUTPATIENT
Start: 2018-09-22 | End: 2018-09-23

## 2018-09-22 RX ORDER — SODIUM CHLORIDE 9 MG/ML
500 INJECTION, SOLUTION INTRAVENOUS ONCE
Status: COMPLETED | OUTPATIENT
Start: 2018-09-22 | End: 2018-09-22

## 2018-09-22 RX ORDER — SODIUM CHLORIDE 9 MG/ML
INJECTION, SOLUTION INTRAVENOUS
Status: COMPLETED
Start: 2018-09-22 | End: 2018-09-22

## 2018-09-22 RX ORDER — NALOXONE HYDROCHLORIDE 0.4 MG/ML
0.1 INJECTION, SOLUTION INTRAMUSCULAR; INTRAVENOUS; SUBCUTANEOUS PRN
Status: DISCONTINUED | OUTPATIENT
Start: 2018-09-22 | End: 2018-09-22 | Stop reason: HOSPADM

## 2018-09-22 RX ORDER — ONDANSETRON 2 MG/ML
4 INJECTION INTRAMUSCULAR; INTRAVENOUS
Status: DISCONTINUED | OUTPATIENT
Start: 2018-09-22 | End: 2018-09-22 | Stop reason: HOSPADM

## 2018-09-22 RX ORDER — SODIUM CHLORIDE, SODIUM LACTATE, POTASSIUM CHLORIDE, CALCIUM CHLORIDE 600; 310; 30; 20 MG/100ML; MG/100ML; MG/100ML; MG/100ML
250 INJECTION, SOLUTION INTRAVENOUS
Status: DISCONTINUED | OUTPATIENT
Start: 2018-09-22 | End: 2018-09-26 | Stop reason: HOSPADM

## 2018-09-22 RX ORDER — GLYCOPYRROLATE 0.2 MG/ML
0.2 INJECTION INTRAMUSCULAR; INTRAVENOUS
Status: DISCONTINUED | OUTPATIENT
Start: 2018-09-22 | End: 2018-09-22 | Stop reason: HOSPADM

## 2018-09-22 RX ORDER — SODIUM CHLORIDE, SODIUM LACTATE, POTASSIUM CHLORIDE, CALCIUM CHLORIDE 600; 310; 30; 20 MG/100ML; MG/100ML; MG/100ML; MG/100ML
INJECTION, SOLUTION INTRAVENOUS CONTINUOUS
Status: DISCONTINUED | OUTPATIENT
Start: 2018-09-22 | End: 2018-09-22 | Stop reason: HOSPADM

## 2018-09-22 RX ADMIN — SODIUM CHLORIDE: 9 INJECTION, SOLUTION INTRAVENOUS at 17:56

## 2018-09-22 RX ADMIN — PHYTONADIONE 10 MG: 10 INJECTION, EMULSION INTRAMUSCULAR; INTRAVENOUS; SUBCUTANEOUS at 01:50

## 2018-09-22 RX ADMIN — SODIUM CHLORIDE: 9 INJECTION, SOLUTION INTRAVENOUS at 11:57

## 2018-09-22 RX ADMIN — NOREPINEPHRINE BITARTRATE 4 MG: 1 INJECTION INTRAVENOUS at 06:00

## 2018-09-22 RX ADMIN — SODIUM CHLORIDE: 9 INJECTION, SOLUTION INTRAVENOUS at 01:15

## 2018-09-22 RX ADMIN — ONDANSETRON 4 MG: 2 INJECTION INTRAMUSCULAR; INTRAVENOUS at 05:21

## 2018-09-22 RX ADMIN — SODIUM CHLORIDE 250 ML: 9 INJECTION, SOLUTION INTRAVENOUS at 05:59

## 2018-09-22 RX ADMIN — FENTANYL CITRATE 100 MCG: 50 INJECTION, SOLUTION INTRAMUSCULAR; INTRAVENOUS at 05:21

## 2018-09-22 RX ADMIN — SODIUM CHLORIDE, POTASSIUM CHLORIDE, SODIUM LACTATE AND CALCIUM CHLORIDE 1000 ML: 600; 310; 30; 20 INJECTION, SOLUTION INTRAVENOUS at 03:00

## 2018-09-22 RX ADMIN — SODIUM CHLORIDE 500 ML: 9 INJECTION, SOLUTION INTRAVENOUS at 17:55

## 2018-09-22 RX ADMIN — SODIUM CHLORIDE 8 MG/HR: 9 INJECTION, SOLUTION INTRAVENOUS at 17:55

## 2018-09-22 RX ADMIN — CYANOCOBALAMIN 1000 MCG: 1000 INJECTION, SOLUTION INTRAMUSCULAR; SUBCUTANEOUS at 17:43

## 2018-09-22 RX ADMIN — CYANOCOBALAMIN 1000 MCG: 1000 INJECTION, SOLUTION INTRAMUSCULAR; SUBCUTANEOUS at 01:02

## 2018-09-22 RX ADMIN — SODIUM CHLORIDE 8 MG/HR: 9 INJECTION, SOLUTION INTRAVENOUS at 07:40

## 2018-09-22 RX ADMIN — NOREPINEPHRINE BITARTRATE 10 MCG/MIN: 1 INJECTION INTRAVENOUS at 07:26

## 2018-09-22 RX ADMIN — GLUCAGON HYDROCHLORIDE 1 MG: 1 INJECTION, POWDER, FOR SOLUTION INTRAMUSCULAR; INTRAVENOUS; SUBCUTANEOUS at 05:21

## 2018-09-22 ASSESSMENT — PAIN SCALES - GENERAL
PAINLEVEL_OUTOF10: 0
PAINLEVEL_OUTOF10: 3
PAINLEVEL_OUTOF10: 0
PAINLEVEL_OUTOF10: 4
PAINLEVEL_OUTOF10: 0
PAINLEVEL_OUTOF10: 3
PAINLEVEL_OUTOF10: 0

## 2018-09-22 ASSESSMENT — ENCOUNTER SYMPTOMS
ENDOCRINE NEGATIVE: 1
DOUBLE VISION: 0
CARDIOVASCULAR NEGATIVE: 1
HEMATOLOGIC/LYMPHATIC NEGATIVE: 1
HEADACHES: 0
MUSCULOSKELETAL NEGATIVE: 1
RESPIRATORY NEGATIVE: 1
BLURRED VISION: 0
NEUROLOGICAL NEGATIVE: 1
ABDOMINAL PAIN: 0
NAUSEA: 0
SHORTNESS OF BREATH: 0
PSYCHIATRIC NEGATIVE: 1
ALLERGIC/IMMUNOLOGIC NEGATIVE: 1
DIZZINESS: 0
VOMITING: 0
MYALGIAS: 0
WEAKNESS: 1
PALPITATIONS: 0
COUGH: 0
CONSTITUTIONAL NEGATIVE: 1
EYES NEGATIVE: 1
GASTROINTESTINAL NEGATIVE: 1

## 2018-09-22 ASSESSMENT — LIFESTYLE VARIABLES
PAROXYSMAL SWEATS: NO SWEAT VISIBLE
TREMOR: NO TREMOR
ANXIETY: NO ANXIETY (AT EASE)
AGITATION: NORMAL ACTIVITY
HEADACHE, FULLNESS IN HEAD: NOT PRESENT
VISUAL DISTURBANCES: NOT PRESENT
ORIENTATION AND CLOUDING OF SENSORIUM: ORIENTED AND CAN DO SERIAL ADDITIONS
NAUSEA AND VOMITING: NO NAUSEA AND NO VOMITING
TOTAL SCORE: 0
AUDITORY DISTURBANCES: NOT PRESENT

## 2018-09-22 NOTE — PROCEDURES
"Date: 9/22/2018    Procedure:  Central Venous Catheter Insertion    Indication: Hypotension    Physician:  Dr. Raj Vazquez MD    Consent:  Obtained from patient    Procedure:  The patient was placed in the [place] position.  Appropriate \"time out\" was performed.  The left neck was prepped and draped in the usual sterile fashion.  Under full body sterile barrier precautions, a triple lumen central venous catheter was placed without difficulty in the left IJ vein position.  US was used for localization and placement.  All lumens were flushed with sterile saline and sterile caps were applied.  The line was sutured in place and a sterile dressing was applied.  There were no immediate complications.  A post-procedure CXR will be reviewed.  Estimated blood loss < 5cc.     "

## 2018-09-22 NOTE — H&P
Internal Medicine Admitting History and Physical    Note Author: Jazmyn Marina M.D.       Name Shannon Balbuena 1950   Age/Sex 68 y.o. female   MRN [id number]   Code Status Full     After 5PM or if no immediate response to page, please call for cross-coverage  Attending/Team: JACOBO/Nancie See Patient List for primary contact information  Call [phone] to page    1st Call - Day Intern (R1):   Dr. Jazmyn Marina        Chief Complaint:   Ground level fall    HPI:  Patient is a 68-year-old lady who was brought in due to a ground-level fall that happened earlier today after receiving a shock from an outlet in her home.  She denies any loss of consciousness.  Patient states she has associated generalized malaise/fatigue, SOB, weakness and decreased appetite.  She also hesitantly stated that she has been having dark stool and coffee-ground emesis for the past 3 months.  She denies taking any NSAIDs, but does endorse drinking 2 shots of bourbon every night - about 4 ounces each.  She has a history of a significant upper GI bleed with duodenal ulcer clipping in 2017.  Patient has not had follow-up with GI since then.      Review of Systems   Constitutional: Positive for malaise/fatigue. Negative for fever.   Respiratory: Negative for cough.    Cardiovascular: Negative for chest pain, palpitations and leg swelling.   Gastrointestinal: Positive for melena, nausea and vomiting. Negative for abdominal pain.   Skin:        Pallor   Neurological: Positive for dizziness and weakness. Negative for loss of consciousness and headaches.        Past Medical History: Upper GI bleed with duodenal ulcer, arthritis, Hep C, HLD, HTN, allergies, cataracts.     Past Surgical History:  Past Surgical History:   Procedure Laterality Date   • GASTROSCOPY N/A 3/6/2017    Procedure: GASTROSCOPY;  Surgeon: Wendy Nguyen M.D.;  Location: SURGERY [facility];  Service:    • GASTROSCOPY-ENDO  3/1/2017     Procedure: GASTROSCOPY-ENDO;  Surgeon: Bishop Maharaj M.D.;  Location: ENDOSCOPY [facility], [place];  Service:    • GASTROSCOPY WITH SCLEROTHERAPY  3/1/2017    Procedure: GASTROSCOPY WITH SCLEROTHERAPY;  Surgeon: Bishop Maharaj M.D.;  Location: ENDOSCOPY [facility], [place];  Service:    • CATARACT PHACO WITH IOL  7/9/2014    Performed by Geeta Nixon M.D. at SURGERY SAME DAY [facility] ORS   • CATARACT PHACO WITH IOL  6/25/2014    Performed by Geeta Nixon M.D. at SURGERY SAME DAY [facility] ORS   • HIP ARTHROPLASTY TOTAL  2007    Hip Replacement, Total, left   • BREAST BIOPSY      hx of benign right breast bx.   • THYROIDECTOMY         Current Outpatient Medications:  Home Medications     Reviewed by Willi Kearney R.N. (Registered Nurse) on 09/21/18 at 1825  Med List Status: Not Addressed   Medication Last Dose Status   amlodipine (NORVASC) 10 MG Tab  Active   carvedilol (COREG) 12.5 MG TABS Not Taking Active   ciprofloxacin (CIPRO) 500 MG TABS Not Taking Active   diazepam (VALIUM) 5 MG TABS  Active   folic acid (FOLVITE) 1 MG Tab  Active   ibuprofen (MOTRIN) 600 MG TABS  Active   levothyroxine (SYNTHROID) 112 MCG Tab 2/28/2017 Active   levothyroxine (SYNTHROID) 112 MCG TABS Taking Active   moexipril (UNIVASC) 15 MG Tab 2/28/2017 Active   nystatin (MYCOSTATIN) Powder  Active   omeprazole (PRILOSEC) 20 MG delayed-release capsule  Active   oxycodone-acetaminophen (PERCOCET) 7.5-325 MG per tablet  Active   potassium chloride SA (KDUR) 20 MEQ Tab CR  Active   prednisoLONE acetate (PRED FORTE) 1 % SUSP Not Taking Active   prednisoLONE acetate (PRED FORTE) 1 % SUSP Not Taking Active              Medication Allergy/Sensitivities:  Allergies   Allergen Reactions   • Codeine Rash   • Codeine      Family History: No family history of cancers or GI disease     Social History: Quit smoking a few years ago, drinks to 4 ounce glasses of bourbon a night for many years.  No illicit drug use.  Lives with her significant  "other.    Social History     Social History   • Marital status:      Spouse name: N/A   • Number of children: N/A   • Years of education: N/A     Occupational History   • Not on file.     Social History Main Topics   • Smoking status: Not on file   • Smokeless tobacco: Not on file   • Alcohol use Yes      Comment: 6x week   • Drug use: No   • Sexual activity: Not on file     Other Topics Concern   • Not on file     Social History Narrative    ** Merged History Encounter **          Living situation: With significant other  PCP : Edvin BREEN M.D.    Physical Exam     Vitals:    09/21/18 1821 09/21/18 1824 09/21/18 1835 09/21/18 2008   BP:  105/66  112/54   Pulse:  (!) 120  (!) 117   Resp:  16  18   Temp:  36.6 °C (97.9 °F)  37.1 °C (98.8 °F)   SpO2:    100%   Weight:   54.4 kg (120 lb)    Height: 1.651 m (5' 5\")        Body mass index is 19.97 kg/m².  /54   Pulse (!) 117   Temp 37.1 °C (98.8 °F)   Resp 18   Ht 1.651 m (5' 5\")   Wt 54.4 kg (120 lb)   SpO2 100%   BMI 19.97 kg/m²   O2 therapy: Pulse Oximetry: 100 %    Physical Exam   Constitutional: She is oriented to person, place, and time.   Appears weak, tired, deconditioned   HENT:   Oral mucosa dry   Eyes: EOM are normal. No scleral icterus.   Neck: Neck supple.   Cardiovascular: Exam reveals no gallop and no friction rub.    No murmur heard.  tachycardic   Pulmonary/Chest: Effort normal. No respiratory distress. She has no wheezes. She has no rales.   Abdominal: Soft. Bowel sounds are normal. She exhibits no distension. There is no tenderness.   Musculoskeletal: She exhibits no edema.   Neurological: She is alert and oriented to person, place, and time. She exhibits normal muscle tone.   Skin:   Pale   Psychiatric:   Hesitant to reveal history         Data Review       Old Records Request:   Completed  Current Records review/summary: Completed    Lab Data Review:  Recent Results (from the past 24 hour(s))   CBC WITH DIFFERENTIAL    " Collection Time: 09/21/18  6:16 PM   Result Value Ref Range    WBC 10.0 4.8 - 10.8 K/uL    RBC 1.57 (L) 4.20 - 5.40 M/uL    Hemoglobin 5.7 (LL) 12.0 - 16.0 g/dL    Hematocrit 18.4 (L) 37.0 - 47.0 %    .2 (H) 81.4 - 97.8 fL    MCH 36.3 (H) 27.0 - 33.0 pg    MCHC 31.0 (L) 33.6 - 35.0 g/dL    RDW 62.4 (H) 35.9 - 50.0 fL    Platelet Count 450 (H) 164 - 446 K/uL    MPV 9.6 9.0 - 12.9 fL    Neutrophils-Polys 79.20 (H) 44.00 - 72.00 %    Lymphocytes 9.50 (L) 22.00 - 41.00 %    Monocytes 10.00 0.00 - 13.40 %    Eosinophils 0.10 0.00 - 6.90 %    Basophils 0.50 0.00 - 1.80 %    Immature Granulocytes 0.70 0.00 - 0.90 %    Nucleated RBC 0.60 /100 WBC    Neutrophils (Absolute) 7.94 (H) 2.00 - 7.15 K/uL    Lymphs (Absolute) 0.95 (L) 1.00 - 4.80 K/uL    Monos (Absolute) 1.00 (H) 0.00 - 0.85 K/uL    Eos (Absolute) 0.01 0.00 - 0.51 K/uL    Baso (Absolute) 0.05 0.00 - 0.12 K/uL    Immature Granulocytes (abs) 0.07 0.00 - 0.11 K/uL    NRBC (Absolute) 0.06 K/uL   BASIC METABOLIC PANEL    Collection Time: 09/21/18  6:16 PM   Result Value Ref Range    Sodium 143 135 - 145 mmol/L    Potassium 4.2 3.6 - 5.5 mmol/L    Chloride 103 96 - 112 mmol/L    Co2 12 (L) 20 - 33 mmol/L    Glucose 139 (H) 65 - 99 mg/dL    Bun 67 (H) 8 - 22 mg/dL    Creatinine 1.45 (H) 0.50 - 1.40 mg/dL    Calcium 9.2 8.5 - 10.5 mg/dL    Anion Gap 28.0 (H) 0.0 - 11.9   TROPONIN    Collection Time: 09/21/18  6:16 PM   Result Value Ref Range    Troponin I 0.02 0.00 - 0.04 ng/mL   HEPATIC FUNCTION PANEL    Collection Time: 09/21/18  6:16 PM   Result Value Ref Range    Alkaline Phosphatase 72 30 - 99 U/L    AST(SGOT) 20 12 - 45 U/L    ALT(SGPT) 9 2 - 50 U/L    Total Bilirubin 0.7 0.1 - 1.5 mg/dL    Direct Bilirubin 0.3 0.1 - 0.5 mg/dL    Indirect Bilirubin 0.4 0.0 - 1.0 mg/dL    Albumin 3.7 3.2 - 4.9 g/dL    Total Protein 7.0 6.0 - 8.2 g/dL   DIAGNOSTIC ALCOHOL    Collection Time: 09/21/18  6:16 PM   Result Value Ref Range    Diagnostic Alcohol 0.00 0.00 g/dL   TSH     Collection Time: 18  6:16 PM   Result Value Ref Range    TSH 4.770 0.380 - 5.330 uIU/mL   PROTHROMBIN TIME (INR)    Collection Time: 18  6:16 PM   Result Value Ref Range    PT 14.7 (H) 12.0 - 14.6 sec    INR 1.14 (H) 0.87 - 1.13   APTT    Collection Time: 18  6:16 PM   Result Value Ref Range    APTT 23.1 (L) 24.7 - 36.0 sec   COD (ADULT)    Collection Time: 18  6:16 PM   Result Value Ref Range    ABO Grouping Only B     Rh Grouping Only POS     Antibody Screen-Cod NEG     Component R       R3                  Red Blood Cells3    [id number]   transfused   18   19:55      Product Type Red Blood Cells LR Pheresis     Dispense Status Transfused     Unit Number (Barcoded) [id number]     Product Code (Barcoded) [id number]     Blood Type (Barcoded) 1700    ESTIMATED GFR    Collection Time: 18  6:16 PM   Result Value Ref Range    GFR If  43 (A) >60 mL/min/1.73 m 2    GFR If Non  36 (A) >60 mL/min/1.73 m 2   MAGNESIUM    Collection Time: 18  6:16 PM   Result Value Ref Range    Magnesium 1.6 1.5 - 2.5 mg/dL   EKG (Now)    Collection Time: 18  6:46 PM   Result Value Ref Range    Report       [facility] Emergency Dept.    Test Date:  2018  Pt Name:    ULYSSES PONCE          Department: ER  MRN:        [id number]                      Room:       [facility]  Gender:     Female                       Technician: [id number]  :        [id number]                   Requested By:ER TRIAGE PROTOCOL  Order #:    [id number]                    Reading MD:    Measurements  Intervals                                Axis  Rate:       122                          P:          64  LA:         120                          QRS:        63  QRSD:       116                          T:          -20  QT:         360  QTc:        513    Interpretive Statements  SINUS TACHYCARDIA  MULTIPLE ATRIAL PREMATURE COMPLEXES  RIGHT BUNDLE BRANCH BLOCK  Compared  to ECG 03/01/2017 14:15:40  Atrial premature complex(es) now present  Right bundle-branch block now present  Incomplete right bundle-branch block no longer present  Prolonged QT interval no longer present         Imaging/Procedures Review:    Independant Imaging Review: Completed  DX-CHEST-PORTABLE (1 VIEW)   Final Result         1. No acute cardiopulmonary abnormalities are identified.      CT-HEAD W/O   Final Result      1. Cerebral atrophy.   2. White matter lucencies most consistent with small vessel ischemic change versus demyelination or gliosis.   3. Otherwise, Head CT without contrast with no acute findings. No evidence of acute cerebral infarction, hemorrhage or mass lesion.      DX-CHEST-PORTABLE (1 VIEW)    (Results Pending)   US-ABDOMEN COMPLETE SURVEY    (Results Pending)      EKG:   EKG Independant Review: Completed  QTc: 513, HR: 122, Sinus Tach, no significant changes from prior.     Records reviewed and summarized in current documentation :  Yes  [employer] teaching service handout given to patient:  Yes         Assessment/Plan     * Upper GI bleeding- (present on admission)   Assessment & Plan    Melena and hematemesis for 3 months  No NSAIDs, but has been drinking Bourban  Hb 5.7  Transfusing PRBCs    Plan:  PRBC when Hb <7  Trend H&H  Protonix drip with bolus  GI following  EGD in am  NPO midnight  Clear liquids - no reds for now  SCDs, no NSAIDs        Prolonged Q-T interval on ECG   Assessment & Plan    QTc 513  Serial EKGs  Avoid QTc prolonging meds when possible        STEFAN (acute kidney injury) (HCC)   Assessment & Plan    Prerenal  IVF  Monitor UOP        Macrocytosis   Assessment & Plan    Pending Folate and VitB12        Alcohol use   Assessment & Plan    Drinks 2 4-5oz bourban a night for many years  [facility] protocol  Thiamine/Folate/MV        Chronic hepatitis C virus infection (HCC)   Assessment & Plan    Untreated  Visceral angiogram in 2017 shows signs of portal htn  ABD US pending - if ascites  present will treat with ceftriaxone empirically for SBP        HTN (hypertension)- (present on admission)   Assessment & Plan    -120s  Hold BP meds in setting of GI bleed        Severe protein-calorie malnutrition (HCC)- (present on admission)   Assessment & Plan    Has had little appetite   Is deconditioned  nutrition consult for supplement        Hypothyroid- (present on admission)   Assessment & Plan    Stable, chronic condition  TSH 4.7  Cont levothyroxine          Anticipated Hospital stay:  >2 midnights  Quality Measures  Quality-Core Measures   Reviewed items::  EKG reviewed, Labs reviewed, Medications reviewed and Radiology images reviewed  P[place]ra catheter::  Critically Ill - Requiring Accurate Measurement of Urinary Output  DVT prophylaxis - mechanical:  SCDs  Ulcer Prophylaxis::  Yes    PCP: Edvin BREEN M.D.

## 2018-09-22 NOTE — CARE PLAN
Problem: Safety  Goal: Will remain free from falls    Intervention: Implement fall precautions   09/22/18 0400   OTHER   Environmental Precautions Treaded Slipper Socks on Patient;Personal Belongings, Wastebasket, Call Bell etc. in Easy Reach;Bed in Low Position;Communication Sign for Patients & Families;Mobility Assessed & Appropriate Sign Placed         Problem: Skin Integrity  Goal: Risk for impaired skin integrity will decrease    Intervention: Implement precautions to protect skin integrity in collaboration with the interdisciplinary team   09/21/18 2130 09/22/18 0400   OTHER   Friction Interventions Draw Sheet / Pad Used for Repositioning --    PT / OT Involved in Care Order has been Placed --    Activity  Bed --    Patient Turns / Repositioning --  Patient Turns Self from Side to Side   Patient is Receiving Nutrition (SIPS) --    Vitamin Therapy in Use Yes --

## 2018-09-22 NOTE — ASSESSMENT & PLAN NOTE
Drinks 2, 4-5oz bourban shots a night for many years  DC'd CIWA   Thiamine/Folate/MV  -B12 and folate wnl, though folate was low normal  -High homocysteine - on folate

## 2018-09-22 NOTE — ASSESSMENT & PLAN NOTE
Has had little appetite   Is deconditioned  Nutrition consulted for supplement; diet has been advanced to regular + supplements rec'd by Nutrition

## 2018-09-22 NOTE — PROGRESS NOTES
Report given to OR receiving RN. Patient connected to monitor transported down to OR for Gastroscopy with transport and ICU RN.

## 2018-09-22 NOTE — DIETARY
"Nutrition services: Day 1 of admit.  Shannon Balbuena is a 68 y.o. female with admitting DX of UGIB    Consult received for FTT.    Per chart review:  · ETOH abuse. Per friend, pt drinks too much and does not eat well.  · H/o a significant upper GI bleed with duodenal ulcer clipping in 2017.  · S/p EGD with biopsy today.  · Labs and meds reviewed. +CIWA vitamins.     Assessment:  Height: 165.1 cm (5' 5\")  Weight: 55.2 kg (121 lb 11.1 oz)  Body mass index is 20.25 kg/m².     Diet/Intake: Clear liquid (no red) diet; no intake recorded (except sips 9/21).    Evaluation:   1. RD interventions are limited @ this time.  2. Will provide nutrition supplements as appropriate with diet order.  3. Nutrition Representative will see pt for dietary preferences as diet advances.    Malnutrition Risk: No nutritional admit screen completed.    Recommendations/Plan:  1. Diet per GI.   2. Nutrition supplements.  3. Document PO intake in ADLs to provide interdisciplinary communication across all shifts.   4. Monitor weight.  5. Nutrition Representative will see pt for ongoing meal and snack preferences as diet allows.    RD following for diet advancement, adequate PO intake, and additional pt needs.             "

## 2018-09-22 NOTE — CARE PLAN
Problem: Nutritional:  Goal: Achieve adequate nutritional intake  Pt tolerating diet >Clears with intake ~50*% of meals and supplements.  Outcome: NOT MET

## 2018-09-22 NOTE — CONSULTS
Critical Care/Pulmonary Consultation    Date of service: 9/21/2018    Consulting Physician: Elizabeth Clemens M.D.    Chief Complaint: Hematemesis and melena.    History of Present Illness: Shannon Balbuena is a 68 y.o. female with a past medical history of duodenal ulcer, alcohol abuse, hypertension, presented to the hospital for evaluation of coffee-ground emesis and melanotic stool accompanied by generalized weakness over the last 2-3 months.  Stool guaiac positive in ED and hemoglobin of 5.7, following administration of blood transfusion patient is to be transferred to ICU for overnight monitoring and EGD in the morning.  She denies nausea, chest pain, abdominal pain.    ROS: As per HPI, otherwise all systems been reviewed and were negative    No current facility-administered medications on file prior to encounter.      Current Outpatient Prescriptions on File Prior to Encounter   Medication Sig Dispense Refill   • amlodipine (NORVASC) 10 MG Tab Take 1 Tab by mouth every day. 30 Tab 1   • nystatin (MYCOSTATIN) Powder Apply 1 Application to affected area(s) 3 times a day. 1 Bottle 1   • folic acid (FOLVITE) 1 MG Tab Take 1 Tab by mouth every day. 30 Tab 1   • potassium chloride SA (KDUR) 20 MEQ Tab CR Take 1 Tab by mouth 2 Times a Day. 30 Tab 0   • omeprazole (PRILOSEC) 20 MG delayed-release capsule Take 1 Cap by mouth 2 Times a Day. 60 Cap 1   • moexipril (UNIVASC) 15 MG Tab Take 15 mg by mouth every evening.     • levothyroxine (SYNTHROID) 112 MCG Tab Take 112 mcg by mouth Every morning on an empty stomach.     • oxycodone-acetaminophen (PERCOCET) 7.5-325 MG per tablet Take 1 Tab by mouth every four hours as needed. 30 Tab 0   • diazepam (VALIUM) 5 MG TABS Take 1 Tab by mouth every 6 hours as needed (muscle spasm). 30 Tab 0   • ibuprofen (MOTRIN) 600 MG TABS Take 1 Tab by mouth every 6 hours as needed. 30 Tab 0   • ciprofloxacin (CIPRO) 500 MG TABS Take 1 Tab by mouth 2 times a day. 4 Tab 0   •  "levothyroxine (SYNTHROID) 112 MCG TABS Take 1 Tab by mouth every day. 30 Tab 0   • prednisoLONE acetate (PRED FORTE) 1 % SUSP Place 1 Drop in right eye 3 times a day.     • carvedilol (COREG) 12.5 MG TABS Take 12.5 mg by mouth every morning.     • prednisoLONE acetate (PRED FORTE) 1 % SUSP Place 1 Drop in left eye 4 times a day.         Social History   Substance Use Topics   • Smoking status: Not on file   • Smokeless tobacco: Not on file   • Alcohol use Yes      Comment: 6x week        Past Medical History:   Diagnosis Date   • Arthritis     hands   • At high risk for falls    • Hepatitis C    • High cholesterol    • HTN (hypertension)    • Seasonal allergies    • Thyroid disease    • Unspecified cataract     bilat       Past Surgical History:   Procedure Laterality Date   • GASTROSCOPY N/A 3/6/2017    Procedure: GASTROSCOPY;  Surgeon: Wendy Nguyen M.D.;  Location: SURGERY [facility];  Service:    • GASTROSCOPY-ENDO  3/1/2017    Procedure: GASTROSCOPY-ENDO;  Surgeon: Bishop Maharaj M.D.;  Location: ENDOSCOPY [facility];  Service:    • GASTROSCOPY WITH SCLEROTHERAPY  3/1/2017    Procedure: GASTROSCOPY WITH SCLEROTHERAPY;  Surgeon: Bishop Maharaj M.D.;  Location: ENDOSCOPY [facility];  Service:    • CATARACT PHACO WITH IOL  7/9/2014    Performed by Geeta Nixon M.D. at SURGERY SAME DAY [facility] ORS   • CATARACT PHACO WITH IOL  6/25/2014    Performed by Geeta Nixon M.D. at SURGERY SAME DAY [facility] ORS   • HIP ARTHROPLASTY TOTAL  2007    Hip Replacement, Total, left   • BREAST BIOPSY      hx of benign right breast bx.   • THYROIDECTOMY         Allergies: Codeine and Codeine    No family history on file.    Vitals:    09/21/18 1821 09/21/18 1824 09/21/18 1835 09/21/18 2008   Height: 1.651 m (5' 5\")      Weight:   54.4 kg (120 lb)    Weight % change since last entry.:   0 %    BP:  105/66  112/54   Pulse:  (!) 120  (!) 117   Resp:  16  18   Temp:  36.6 °C (97.9 °F)  37.1 °C (98.8 °F)    " 09/21/18 2013 09/21/18 2018 09/21/18 2026 09/21/18 2030   Height:       Weight:       Weight % change since last entry.:       BP: 124/51 136/63 138/53    Pulse: 97 86 78 (!) 42   Resp: 18 18 18 17   Temp: 37.5 °C (99.5 °F) 37.5 °C (99.5 °F)         Physical Examination  General: Average build  Neuro/Psych: Appears anxious, CN 2-12 grossly within normal limits, no focal neurological deficits, alert and oriented ×4  HEENT: Head is normocephalic, atraumatic, PERRLA, EOMI  CVS: S1-S2 within normal limits, regular rate, no murmurs rubs or gallops, no peripheral lower extremities edema  Respiratory: Clear breath sounds bilaterally, trachea is midline, symmetric expansion of the chest with respirations  Abdomen/: Soft, nontender, nondistended abdomen  Extremities: No bony deformities, joint swelling, joint erythema  Skin: No skin rashes, bruises, jaundice except pallor    Recent Labs      09/21/18   1816   WBC  10.0   NEUTSPOLYS  79.20*   LYMPHOCYTES  9.50*   MONOCYTES  10.00   EOSINOPHILS  0.10   BASOPHILS  0.50   ASTSGOT  20   ALTSGPT  9   ALKPHOSPHAT  72   TBILIRUBIN  0.7     Recent Labs      09/21/18   1816   SODIUM  143   POTASSIUM  4.2   CHLORIDE  103   CO2  12*   BUN  67*   CREATININE  1.45*   MAGNESIUM  1.6   CALCIUM  9.2     Recent Labs      09/21/18   1816   ALTSGPT  9   ASTSGOT  20   ALKPHOSPHAT  72   TBILIRUBIN  0.7   DBILIRUBIN  0.3   GLUCOSE  139*           Invalid input(s): [id number]  DX-CHEST-PORTABLE (1 VIEW)   Final Result         1. No acute cardiopulmonary abnormalities are identified.      CT-HEAD W/O   Final Result      1. Cerebral atrophy.   2. White matter lucencies most consistent with small vessel ischemic change versus demyelination or gliosis.   3. Otherwise, Head CT without contrast with no acute findings. No evidence of acute cerebral infarction, hemorrhage or mass lesion.      DX-CHEST-PORTABLE (1 VIEW)    (Results Pending)       Assessment & Plan       1.  Upper GI bleed  -Protonix  gtt  -History of duodenal ulcer  -Transfuse PRBC to keep hemoglobin equal or greater 7 g/dL  -Series H&H  -GI is following  -EGD in a.m.    2.  Macrocytic anemia  -Thiamine and folate  -B12 pending    3.  History of alcohol abuse  -Thiamine and folate  -CIWA protocol in place    4.  History of hypertension  -Scheduled Coreg  -Labetalol as needed    5.  Hypothyroidism  -Continue with Synthroid    6.  Acute kidney injury  -IV fluids  -Monitoring daily renal function    7.  ICU prophylaxis  -SCDs and Protonix    Family meeting: I spent 10 minutes with patient's significant other at the bedside discussing her clinical condition and plan of management    Assessment and plan were discussed with the patient, her significant other, ICU medical resident.

## 2018-09-22 NOTE — OR SURGEON
Immediate Post OP Note    PreOp Diagnosis: Melena    PostOp Diagnosis: Duodenal bulb ulcer    Procedure(s):  GASTROSCOPY - Wound Class: Clean Contaminated    Surgeon(s):  David Hinojosa M.D.    Anesthesiologist/Type of Anesthesia:  Anesthesiologist: Solomon Stanton M.D./General    Surgical Staff:  Circulator: Navi Duffy R.N.  Endoscopy Technician: Duncan Gillette    Specimens removed if any:  ID Type Source Tests Collected by Time Destination   A : biopsy Tissue Duodenum HISTOLOGY REQUEST David Hinojosa M.D. 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 Will need to stay on high dose long term PPI. If rebleeding recommend surgical resection as these coils may be now maintaining the nidus for ulceration.        9/22/2018 10:25 AM David Hinojosa M.D.

## 2018-09-22 NOTE — CONSULTS
DATE OF SERVICE:  09/21/2018    GASTROENTEROLOGY CONSULTATION    CONSULTATION REQUESTED BY:  Dr. Clemens.    REASON FOR CONSULTATION:  Melena.    IDENTIFICATION:  A 68-year-old  female.    CHIEF COMPLAINT:  Rectal bleeding.    HISTORY OF PRESENT ILLNESS:  History was obtained via interview of the patient   and her  who helps provide the history.  Additionally, the case was   discussed with Dr. Clemens and available GI consultants and [facility] records were   reviewed.  The patient is known to me from hospitalization in 03/2017 for a   large volume life threatening bleed from a duodenal ulcer.  I conducted EGD on   03/01/2017.  I was unable to stop the bleeding and she subsequently went to   interventional radiology where Dr. Shane coil embolized the branch of her   gastroduodenal artery.  The patient did well afterwards from that standpoint.    She had been taking a large amount of nonsteroidal anti-inflammatory type   medications prior to that episode, I saw her in clinic twice after that.  She   continues to drink alcohol daily.  She also has hepatitis C genotype 1.  I was   planning to treat her, but she has not followed up in about a year.  Now, the   patient re-presents complaining of feeling like she got shocked today in her   kitchen, she is a very poor historian and is unsure if she actually got   shocked.  She felt a shock like sensation in her epigastrium and then fell to   the ground.  She is apparently down for about an hour, she denies a loss of   consciousness.  Her  found her and brought her to the emergency   department.  She apparently did not sustain any head trauma when she fell   either.  She tells me that she has had intermittent black stools for several   months now.  She denies any nonsteroidal anti-inflammatory type medication use   and instead uses Tylenol.  She has some heartburn at times for which she   takes Tums, no difficulty swallowing, no abdominal pain per se.  No  bright red   blood per rectum.  Otherwise, the patient also has a pertinent history of   hypertension and hypothyroidism.  Here, her initial hemoglobin was found to be   5.7.  Her white count is normal.  Her MCV is elevated at 117.  Her platelet   count 450,000.  BUN is 67 with a creatinine of 1.45, INR is mildly elevated at   1.14.  TSH is 4.77.    ALLERGIES:  CODEINE WHICH CAUSES A RASH.    HOME MEDICATIONS:  Current home medications include amlodipine, nystatin,   folic acid, potassium chloride, omeprazole, Univasc, levothyroxine, oxycodone,   diazepam, ciprofloxacin, levothyroxine, prednisone, carvedilol.  Of note,   ibuprofen is actually on her home medication list.  Although, her    denies that she takes this.    PAST MEDICAL HISTORY:  1.  Peptic ulcer disease with a history of duodenal ulcer in 2017, see the   HPI.  2.  Hypertension.  3.  Hypothyroidism.  4.  Alcohol abuse.  5.  Hepatitis C virus infection.    PAST SURGICAL HISTORY:  1.  Interventional radiology-guided coil embolization of the gastroduodenal   artery in 03/2017.  2.  Right foot neuroma.  3.  Appendectomy.  4.  Thyroidectomy.  5.  Left femur fracture repair.    SOCIAL HISTORY:  Prior tobacco abuse.  She is abstinent for several years,   ongoing daily alcohol use.  No drug use.  She is  and lives in [place].    FAMILY HISTORY:  Noncontributory.  Specifically, no luminal GI disease, liver   disease, or pancreatic disease.    REVIEW OF SYSTEMS:  Please see the HPI.  Otherwise, all systems negative per   CMS criteria.    PHYSICAL EXAMINATION:  GENERAL:  Elderly  female appearing older than stated age,   disheveled, cooperative, and appropriate.  Quite pale.  VITAL SIGNS:  Blood pressure 105/66, heart rate 120, afebrile, respiratory   rate 16, oxygen saturation 99% on room air.  HEENT:  Normocephalic and atraumatic.  Sclerae are anicteric.  Conjunctivae   pale.  Oropharynx, moist and clear with a Mallampati score of 3.  NECK:   No thyroid abnormality or lymphadenopathy.  LUNGS:  Clear to auscultation without wheezes, rales, or rhonchi.  CARDIOVASCULAR:  Tachycardic without audible murmur.  ABDOMEN:  Bowel sounds present, soft, nontender, nondistended.  RECTAL:  Melena, per the emergency department attending, no masses.  EXTREMITIES:  No clubbing, cyanosis or edema.  SKIN:  Quite pale, few scattered spider angiomata about the anterior chest   wall.  Palmar erythema is present.  NEUROLOGICAL:  Grossly nonfocal.  Alert and oriented.    LABORATORY DATA:  See the HPI.    IMPRESSION:  A 68-year-old  female with a large volume life   threatening duodenal ulcer bleed in 03/2017, now presents after a ground level   fall at home.  She is found to be quite pale and anemic here.  She is found   to have melena on examination and reports intermittent dark stools lately.    She denies any nonsteroidal anti-inflammatory type use recently.  It is   unclear to me what her Helicobacter pylori status was in 2017.  At this time,   possibilities would include recurrent peptic ulcer disease, bleeding from a   portal hypertensive source such as esophageal or gastric varices (seems   unlikely based on her lab data, with normal platelet count), bleeding from   angiodysplasia, breathing from neoplasia or from a source distal to the   ligament of [place].    PROBLEM LIST:  1.  Melena.  2.  Anemia with component of acute gastrointestinal blood loss.  She also has   an elevated MCV, which likely signifies folate and/or B12 deficiency.  3.  History of peptic ulcer disease - see above.  4.  Hepatitis C virus, genotype 1, untreated.  5.  Alcohol abuse.  6.  Heartburn.  7.  Coagulopathy.  8.  Hypotension.  9.  Hypothyroidism.    PLAN AND RECOMMENDATIONS:  1.  I agree with admission to the ICU by the hospitalist with gastroenterology   following closely.  2.  Pantoprazole 80 mg IV bolus followed by 8 mg IV per hour.  3.  Serial hemoglobin and hematocrit every 6  hours with transfusion as needed   for hemoglobin less than 7.  I recommend transfusion of 1 unit of packed red   blood cells now.  4.  Consider checking thromboelastography if the patient manifest evidence of   increased overt GI blood losses.  5.  Esophagogastroduodenoscopy for diagnostic and potentially therapeutic   purposes once the patient has been resuscitated.  I recommend anesthesiologist   assistance given her use of pain medication and benzodiazepines as well as   alcohol.  She is high risk.  6.  N.p.o. for the time being.  7.  I will discuss the case with Dr. David Hinojosa who will assume care for our   practice tomorrow.  8.  I will defer to the hospitalist for management of the patient's other   comorbid conditions.  Obvious at this time, we will hold any antihypertensive   medications.    Thank you for allowing me to participate in the care of this patient.       ____________________________________     MD LYLE PEREZ / MAN    DD:  09/21/2018 21:37:51  DT:  09/21/2018 23:27:17    D#:  [id number]  Job#:  [id number]    cc: NEFTALY CHAIREZ MD, Elizabeth Clemens MD

## 2018-09-22 NOTE — ASSESSMENT & PLAN NOTE
-hypotensive overnight again, due to GI bleed  -central line placed 9/21, and received bolus crystalloid + 2U pRBCs that night; has not required any further pRBCs  Since received first 2 units  -levophed discontinued this morning but kept on board in case needed again  -will continue to watch BPs; if stable throughout day may be able to transfer out late in the day  -remains on levophed + 125/hr NS

## 2018-09-22 NOTE — ASSESSMENT & PLAN NOTE
Hold BP meds in setting of GI bleed   Pharmacy med-rec 9/25 shows she was not taking any anti-hypertensives at baseline  Increasing amlodipine from 2.5 to 5 mg Qd

## 2018-09-22 NOTE — ED NOTES
Report to Tarik PANDYA at bedside, pt updated on poc.vss. Lab at bedside for blood drawn, L-EJ placed by Bishop PANDYA

## 2018-09-22 NOTE — ASSESSMENT & PLAN NOTE
Untreated  Visceral angiogram in 2017 shows signs of portal htn  -US abd showed scant ascites so no abx prophylaxis started  Will need f/u with Dr. Maharaj (GI) as outpt for treatment

## 2018-09-22 NOTE — PROCEDURES
DATE OF SERVICE:  09/22/2018    This is an EGD with biopsy report.    ENDOSCOPIST:  David Hinojosa MD    INDICATION:  Melena.    PROCEDURE:  EGD with biopsy.    MEDICATIONS:  Patient received general endotracheal anesthesia by Dr. Stanton.    Please see anesthesia flow sheets for details.    DESCRIPTION OF PROCEDURE:  The patient was explained in detail the indications   as well as the risks, the benefits, the alternatives of the procedure.  She   indicates understanding of all of this and agrees to proceed.  Consent is   signed and placed on the chart.  After the patient was deemed adequately   sedated, a standard Olympus gastroscope was lubricated and gently placed   through a bite block down into her esophagus and then into her stomach.  Any   residual fluid was thoroughly suctioned.  The scope was then traversed through   the stomach into the duodenum and immediately it was noted in the bulb, there   was a large ulcerated area with some stricturing with exposed embolization   coils most likely.  There was no active bleeding, no obvious high risk   stigmata appreciated.  We were able to get partially around this area very   carefully.  However, given the exposed metallic hardware and this large ulcer,   no aggressive efforts were made beyond the second portion of the duodenum.    Biopsies were obtained around this large ulcer rim in the margin and will be   sent to rule out malignancy.  Air was then suctioned.  The scope was withdrawn   back into the stomach whereby careful inspection revealed no evidence of any   findings.  Retroflexion was performed.  Air was then suctioned out of the   stomach and scope withdrawn back into the esophagus and careful inspection   revealed only some very mild distal esophagitis.  The scope was then withdrawn   and the procedure terminated.    FINDINGS:  Very large duodenal bulb ulcer with heaped margins, biopsies   obtained, suspect chronic ulcer and suspect that the most likely  these are   exposed embolization coils from her previous embolization a year ago.  It may   be serving as a nidus for ongoing ulceration at this point ironically, but at   this point, we will await biopsy results.  We will continue her PPI drip for   now, but I would advance her diet to full liquids.  She will at least need to   stay on long-term high dose PPIs and will likely need a repeat EGD as an   outpatient and of course need to avoid all NSAIDs.  If she were to rebleed, I   do not feel that given these findings today that any additional interventional   radiology embolization would be appropriate and most likely she would need   surgical intervention.  Given this possibility that the existence of the   coils, which would not go away with any additional endoscopic or   interventional radiology therapy would persist and may continue to lead to   ongoing issues with ulceration and potentially bleeding and even perforation   in the future and hence any additional bleeding, I would suggest consideration   of surgical resection.  2.  Anemia, acute gastrointestinal blood loss, see discussion above.  3.  Melena, see discussion above.  4.  Carrier of hepatitis C.  The patient never did pursue treatment and will   need to be arranged for followup with Dr. Maharaj in our service.  5.  Alcohol dependence, now abstaining apparently.  6.  Heartburn.  7.  Coagulopathy.       ____________________________________     MD ZULEMA VASQUEZ / MAN    DD:  09/22/2018 10:34:10  DT:  09/22/2018 10:53:55    D#:  [id number]  Job#:  [id number]

## 2018-09-22 NOTE — PROGRESS NOTES
Critical Care Progress Note    Date of admission  9/21/2018    Chief Complaint  68 y.o. Female with a past medical history of duodenal ulcer, alcohol abuse, hypertension, presented to the hospital for evaluation of coffee-ground emesis and melanotic stool accompanied by generalized weakness over the last 2-3 months.  Stool guaiac positive in ED and hemoglobin of 5.7, following administration of blood transfusion patient was transferred to ICU for monitoring and EGD in the morning.  She denies nausea, chest pain, abdominal pain.      Hospital Course  She is transfused packed red blood cells  She required central line placement for resuscitation and pressors  She had upper endoscopy which revealed a large duodenal bulb ulcer with heaped margins and exposed embolization coils from her previous gastroduodenal artery embolization by IR approximately 1 year ago      Interval Problem Update  Reviewed last 24 hour events:  Overnight events as described above patient admitted on 9/21    Review of Systems  Review of Systems   Constitutional: Negative.    HENT: Negative.    Eyes: Negative.    Respiratory: Negative.    Cardiovascular: Negative.    Gastrointestinal: Negative.    Endocrine: Negative.    Genitourinary: Negative.    Musculoskeletal: Negative.    Skin: Negative.    Allergic/Immunologic: Negative.    Neurological: Negative.    Hematological: Negative.    Psychiatric/Behavioral: Negative.         Vital Signs for last 24 hours   Temp:  [36.5 °C (97.7 °F)-37.5 °C (99.5 °F)] 36.5 °C (97.7 °F)  Pulse:  [] 69  Resp:  [14-23] 14  BP: ()/(48-66) 85/48    Hemodynamic parameters for last 24 hours              Physical Exam   Physical Exam   Constitutional: She is oriented to person, place, and time.   Pale somewhat ill-appearing  Alert denies discomfort shortness of breath chest pain or abdominal pain   HENT:   Head: Normocephalic and atraumatic.   Mouth/Throat: Oropharynx is clear and moist.   Eyes: Pupils are  equal, round, and reactive to light.   Neck: Normal range of motion. Neck supple.   Cardiovascular: Normal rate and regular rhythm.    Pulmonary/Chest: Effort normal and breath sounds normal.   Abdominal: Soft.   Musculoskeletal:   Normal inspection   Neurological: She is alert and oriented to person, place, and time.   Skin: Skin is warm and dry.   Psychiatric: She has a normal mood and affect.       Medications  Current Facility-Administered Medications   Medication Dose Route Frequency Provider Last Rate Last Dose   • lactated ringers infusion  1,000 mL Intravenous Continuous Jazmyn Marina M.D. 999 mL/hr at 09/22/18 0300 1,000 mL at 09/22/18 0300   • lactated ringers infusion  250 mL Intravenous Once PRN Jazmyn Marina M.D.       • norepinephrine (LEVOPHED) 8 mg in  mL Infusion  0-30 mcg/min Intravenous Continuous Raj Canada M.D.       • pantoprazole (PROTONIX) 80 mg in  mL Infusion  8 mg/hr Intravenous Continuous Raj Canada M.D. 25 mL/hr at 09/21/18 2157 8 mg/hr at 09/21/18 2157   • MD Alert...ICU Electrolyte Replacement per Pharmacy   Other pharmacy to dose Raj Canada M.D.       • senna-docusate (PERICOLACE or SENOKOT S) 8.6-50 MG per tablet 2 Tab  2 Tab Oral BID Jazmyn Marina M.D.   Stopped at 09/22/18 0600    And   • polyethylene glycol/lytes (MIRALAX) PACKET 1 Packet  1 Packet Oral QDAY PRN Jazmyn Marina M.D.        And   • magnesium hydroxide (MILK OF MAGNESIA) suspension 30 mL  30 mL Oral QDAY PRN Jazmyn Marina M.D.        And   • bisacodyl (DULCOLAX) suppository 10 mg  10 mg Rectal QDAY PRN Jazmyn Marina M.D.       • Respiratory Care per Protocol   Nebulization Continuous RT Jazmyn Marina M.D.       • labetalol (NORMODYNE,TRANDATE) injection 10 mg  10 mg Intravenous Q30 MIN PRN Raj Canada M.D.       • levothyroxine (SYNTHROID) tablet 112 mcg  112 mcg Oral AM ES Raj Canada M.D.   Stopped at 09/22/18 0600   • thiamine  tablet 100 mg  100 mg Oral DAILY Raj Canada M.D.   100 mg at 09/21/18 2156   • folic acid (FOLVITE) tablet 1 mg  1 mg Oral DAILY Raj Canada M.D.   1 mg at 09/21/18 2156   • LORazepam (ATIVAN) tablet 0.5 mg  0.5 mg Oral Q4HRS PRN Raj Canada M.D.       • LORazepam (ATIVAN) tablet 1 mg  1 mg Oral Q4HRS PRN Raj Canada M.D.        Or   • LORazepam (ATIVAN) injection 0.5 mg  0.5 mg Intravenous Q4HRS PRN Raj Canada M.D.       • LORazepam (ATIVAN) tablet 2 mg  2 mg Oral Q2HRS PRN Raj Canada M.D.        Or   • LORazepam (ATIVAN) injection 1 mg  1 mg Intravenous Q2HRS PRN Raj Canada M.D.       • LORazepam (ATIVAN) tablet 3 mg  3 mg Oral Q HOUR PRN Raj Canada M.D.        Or   • LORazepam (ATIVAN) injection 1.5 mg  1.5 mg Intravenous Q HOUR PRN Raj Canada M.D.       • LORazepam (ATIVAN) tablet 4 mg  4 mg Oral Q15 MIN PRN Raj Canada M.D.        Or   • LORazepam (ATIVAN) injection 2 mg  2 mg Intravenous Q15 MIN PRN Raj Canada M.D.       • multivitamin (THERAGRAN) tablet 1 Tab  1 Tab Oral DAILY Jazmyn Marina M.D.   Stopped at 09/22/18 0600   • cyanocobalamin (VITAMIN B-12) injection 1,000 mcg  1,000 mcg Intramuscular DAILY AT 1800 Raj Canada M.D.   1,000 mcg at 09/22/18 0102       Fluids    Intake/Output Summary (Last 24 hours) at 09/22/18 0712  Last data filed at 09/22/18 0600   Gross per 24 hour   Intake          3427.92 ml   Output                0 ml   Net          3427.92 ml       Laboratory  Recent Results (from the past 48 hour(s))   IRON/TOTAL IRON BIND    Collection Time: 09/21/18 12:00 AM   Result Value Ref Range    Iron 129 40 - 170 ug/dL    Total Iron Binding 371 250 - 450 ug/dL    % Saturation 35 15 - 55 %   CBC WITH DIFFERENTIAL    Collection Time: 09/21/18  6:16 PM   Result Value Ref Range    WBC 10.0 4.8 - 10.8 K/uL    RBC 1.57 (L) 4.20 - 5.40 M/uL    Hemoglobin 5.7 (LL) 12.0 - 16.0 g/dL    Hematocrit 18.4 (L) 37.0 - 47.0 %     .2 (H) 81.4 - 97.8 fL    MCH 36.3 (H) 27.0 - 33.0 pg    MCHC 31.0 (L) 33.6 - 35.0 g/dL    RDW 62.4 (H) 35.9 - 50.0 fL    Platelet Count 450 (H) 164 - 446 K/uL    MPV 9.6 9.0 - 12.9 fL    Neutrophils-Polys 79.20 (H) 44.00 - 72.00 %    Lymphocytes 9.50 (L) 22.00 - 41.00 %    Monocytes 10.00 0.00 - 13.40 %    Eosinophils 0.10 0.00 - 6.90 %    Basophils 0.50 0.00 - 1.80 %    Immature Granulocytes 0.70 0.00 - 0.90 %    Nucleated RBC 0.60 /100 WBC    Neutrophils (Absolute) 7.94 (H) 2.00 - 7.15 K/uL    Lymphs (Absolute) 0.95 (L) 1.00 - 4.80 K/uL    Monos (Absolute) 1.00 (H) 0.00 - 0.85 K/uL    Eos (Absolute) 0.01 0.00 - 0.51 K/uL    Baso (Absolute) 0.05 0.00 - 0.12 K/uL    Immature Granulocytes (abs) 0.07 0.00 - 0.11 K/uL    NRBC (Absolute) 0.06 K/uL   BASIC METABOLIC PANEL    Collection Time: 09/21/18  6:16 PM   Result Value Ref Range    Sodium 143 135 - 145 mmol/L    Potassium 4.2 3.6 - 5.5 mmol/L    Chloride 103 96 - 112 mmol/L    Co2 12 (L) 20 - 33 mmol/L    Glucose 139 (H) 65 - 99 mg/dL    Bun 67 (H) 8 - 22 mg/dL    Creatinine 1.45 (H) 0.50 - 1.40 mg/dL    Calcium 9.2 8.5 - 10.5 mg/dL    Anion Gap 28.0 (H) 0.0 - 11.9   TROPONIN    Collection Time: 09/21/18  6:16 PM   Result Value Ref Range    Troponin I 0.02 0.00 - 0.04 ng/mL   HEPATIC FUNCTION PANEL    Collection Time: 09/21/18  6:16 PM   Result Value Ref Range    Alkaline Phosphatase 72 30 - 99 U/L    AST(SGOT) 20 12 - 45 U/L    ALT(SGPT) 9 2 - 50 U/L    Total Bilirubin 0.7 0.1 - 1.5 mg/dL    Direct Bilirubin 0.3 0.1 - 0.5 mg/dL    Indirect Bilirubin 0.4 0.0 - 1.0 mg/dL    Albumin 3.7 3.2 - 4.9 g/dL    Total Protein 7.0 6.0 - 8.2 g/dL   DIAGNOSTIC ALCOHOL    Collection Time: 09/21/18  6:16 PM   Result Value Ref Range    Diagnostic Alcohol 0.00 0.00 g/dL   TSH    Collection Time: 09/21/18  6:16 PM   Result Value Ref Range    TSH 4.770 0.380 - 5.330 uIU/mL   PROTHROMBIN TIME (INR)    Collection Time: 09/21/18  6:16 PM   Result Value Ref Range    PT 14.7 (H)  12.0 - 14.6 sec    INR 1.14 (H) 0.87 - 1.13   APTT    Collection Time: 18  6:16 PM   Result Value Ref Range    APTT 23.1 (L) 24.7 - 36.0 sec   COD (ADULT)    Collection Time: 18  6:16 PM   Result Value Ref Range    ABO Grouping Only B     Rh Grouping Only POS     Antibody Screen-Cod NEG     Component R       R3                  Red Blood Cells3    [id number]   transfused   18   19:55      Product Type Red Blood Cells LR Pheresis     Dispense Status Transfused     Unit Number (Barcoded) [id number]     Product Code (Barcoded) R5556K97     Blood Type (Barcoded) 1700     Component R       R3                  Red Blood Cells3    [id number]   issued       18   01:02      Product Type Red Blood Cells LR Pheresis     Dispense Status Issued     Unit Number (Barcoded) [id number]     Product Code (Barcoded) [id number]     Blood Type (Barcoded) 1700    ESTIMATED GFR    Collection Time: 18  6:16 PM   Result Value Ref Range    GFR If  43 (A) >60 mL/min/1.73 m 2    GFR If Non  36 (A) >60 mL/min/1.73 m 2   MAGNESIUM    Collection Time: 18  6:16 PM   Result Value Ref Range    Magnesium 1.6 1.5 - 2.5 mg/dL   IRON/TOTAL IRON BIND    Collection Time: 18  6:16 PM   Result Value Ref Range    Iron 89 40 - 170 ug/dL    Total Iron Binding 396 250 - 450 ug/dL    % Saturation 22 15 - 55 %   EKG (Now)    Collection Time: 18  6:46 PM   Result Value Ref Range    Report       [facility]    Test Date:  2018  Pt Name:    ULYSSES PONCE          Department: ER  MRN:        [id number]                      Room:       [facility]  Gender:     Female                       Technician: [id number]  :        1950                   Requested By:ER TRIAGE PROTOCOL  Order #:    [id number]                    Roshan MD:    Measurements  Intervals                                Axis  Rate:       122                          P:           64  AL:         120                          QRS:        63  QRSD:       116                          T:          -20  QT:         360  QTc:        513    Interpretive Statements  SINUS TACHYCARDIA  MULTIPLE ATRIAL PREMATURE COMPLEXES  RIGHT BUNDLE BRANCH BLOCK  Compared to ECG 03/01/2017 14:15:40  Atrial premature complex(es) now present  Right bundle-branch block now present  Incomplete right bundle-branch block no longer present  Prolonged QT interval no longer present     PLATELET MAPPING WITH BASIC TEG    Collection Time: 09/21/18  9:28 PM   Result Value Ref Range    Reaction Time Initial-R 4.1 (L) 5.0 - 10.0 min    Clot Kinetics-K 1.1 1.0 - 3.0 min    Clot Angle-Angle 73.9 (H) 53.0 - 72.0 degrees    Maximum Clot Strength-MA 69.5 50.0 - 70.0 mm    Lysis 30 minutes-LY30 11.9 (HH) 0.0 - 8.0 %    % Inhibition ADP 2.6 %    % Inhibition AA 2.8 %    TEG Algorithm Link Algorithm    VITAMIN B12    Collection Time: 09/21/18  9:28 PM   Result Value Ref Range    Vitamin B12 -True Cobalamin 412 211 - 911 pg/mL   FOLATE    Collection Time: 09/21/18  9:28 PM   Result Value Ref Range    Folate -Folic Acid 4.5 >4.0 ng/mL   LACTIC ACID    Collection Time: 09/21/18  9:32 PM   Result Value Ref Range    Lactic Acid 1.8 0.5 - 2.0 mmol/L   HEMOGLOBIN AND HEMATOCRIT    Collection Time: 09/22/18 12:11 AM   Result Value Ref Range    Hemoglobin 6.9 (L) 12.0 - 16.0 g/dL    Hematocrit 20.7 (L) 37.0 - 47.0 %   CBC WITHOUT DIFFERENTIAL    Collection Time: 09/22/18  5:31 AM   Result Value Ref Range    WBC 5.4 4.8 - 10.8 K/uL    RBC 2.33 (L) 4.20 - 5.40 M/uL    Hemoglobin 7.7 (L) 12.0 - 16.0 g/dL    Hematocrit 23.2 (L) 37.0 - 47.0 %    MCV 99.6 (H) 81.4 - 97.8 fL    MCH 33.0 27.0 - 33.0 pg    MCHC 33.2 (L) 33.6 - 35.0 g/dL    RDW 63.7 (H) 35.9 - 50.0 fL    Platelet Count 267 164 - 446 K/uL    MPV 9.5 9.0 - 12.9 fL   PHOSPHORUS    Collection Time: 09/22/18  5:31 AM   Result Value Ref Range    Phosphorus 4.4 2.5 - 4.5 mg/dL   Magnesium     Collection Time: 18  5:31 AM   Result Value Ref Range    Magnesium 2.1 1.5 - 2.5 mg/dL   Basic Metabolic Panel (BMP)    Collection Time: 18  5:31 AM   Result Value Ref Range    Sodium 145 135 - 145 mmol/L    Potassium 3.8 3.6 - 5.5 mmol/L    Chloride 110 96 - 112 mmol/L    Co2 14 (L) 20 - 33 mmol/L    Glucose 89 65 - 99 mg/dL    Bun 59 (H) 8 - 22 mg/dL    Creatinine 1.28 0.50 - 1.40 mg/dL    Calcium 7.8 (L) 8.5 - 10.5 mg/dL    Anion Gap 21.0 (H) 0.0 - 11.9   ESTIMATED GFR    Collection Time: 18  5:31 AM   Result Value Ref Range    GFR If African American 50 (A) >60 mL/min/1.73 m 2    GFR If Non  41 (A) >60 mL/min/1.73 m 2   EKG: Every morning x 3 days    Collection Time: 18  5:34 AM   Result Value Ref Range    Report       [facility]    Test Date:  2018  Pt Name:    ULYSSES PONCE          Department: 161  MRN:        [id number]                      Room:       [facility]  Gender:     Female                       Technician: HERMANN  :        1950                   Requested By:MOON CAMEJO  Order #:    [id number]                    Reading MD: David Diaz MD    Measurements  Intervals                                Axis  Rate:       72                           P:          60  SC:         140                          QRS:        64  QRSD:       128                          T:          46  QT:         452  QTc:        495    Interpretive Statements  SINUS RHYTHM  RIGHT BUNDLE BRANCH BLOCK  Compared to ECG 2018 18:46:50  Sinus tachycardia no longer present  Atrial premature complex(es) no longer present    Electronically Signed On 2018 5:44:20 PDT by David Diaz MD       Assessment/Plan      1.  Upper GI bleed  -Protonix gtt  -History of duodenal ulcer  -Transfuse PRBC to keep hemoglobin equal or greater 7 g/dL  -Series H&H  -GI is following  -EGD revealed duodenal ulcer and exposed embolization coils from IR and passed  -Started  clears     2.  Macrocytic anemia  -Thiamine and folate  -B12 pending     3.  History of alcohol abuse  -Thiamine and folate  -WA protocol in place     4.  History of hypertension, now hypotension  -Scheduled Coreg-she was given a single dose of this last night.  She became hypotensive.  This is because she was hypovolemic and anemic.   -No need for antihypertensives in the setting.  -We will hold off until euvolemic and hemodynamic status has stabilized  -Consider resumption of beta-blocker if she starts showing signs of hypertension.  -Currently titrating norepinephrine to maintain systolic blood pressure  -Needs volume     5.  Hypothyroidism  -Continue with Synthroid     6.  Acute kidney injury  -IV fluids  -Monitoring daily renal function     7.  ICU prophylaxis  -SCDs and Protonix        Assessment and plan were discussed with the patient, her significant other, ICU medical resident.       VTE:  Contraindicated  Ulcer: PPI  Lines: Central Line  Ongoing indication addressed    I have performed a physical exam and reviewed and updated ROS and Plan today (9/22/2018). In review of yesterday's note (9/21/2018), there are no changes except as documented above.     Discussed patient condition and risk of morbidity and/or mortality with Pharmacy, , [facility] resident, Charge nurse / hot rounds and Patient     I have assessed and reassessed her hemodynamics and respiratory and neurologic status throughout the day.  She remains at high risk for deterioration and worsening vital organ dysfunction without the above critical care interventions.  Critical care time 41 minutes with detailed exam and data review, coordination of care, directing resuscitation, and documentation.

## 2018-09-22 NOTE — ED PROVIDER NOTES
"ED Provider Note    Scribed for Elizabeth Clemens M.D. by Sean Han. 9/21/2018  6:30 PM    Means of arrival: Ambulance  History obtained from: Patient  History limited by: None      CHIEF COMPLAINT  Chief Complaint   Patient presents with   • GLF     PT BELIEVES SHE STUCK IN HAND INTO A LIGHT SWITCH, WAS SHOCKED AND THEN FELL TO THE GROUND.  NO LOC, NO AMBULATORY AT SCENE, DENIES ANY INJURY, NO LEG SHORTENING OR EXT ROTATION, PT IS VERY PALE AND PER  HAS HAD \"DETERIORATING HEALTH\", HX OF GI BLEED, DENIES BLEEDING       HPI  Shannon Balbuena is a 68 y.o. female with past medical history of hypertension and hypothyroidism in addition to chronic alcohol abuse, who presents to the Emergency Department for evaluation status post possible electrocution and a ground level fall. Per patient, tonight while she at home she believes she was electrocuted as she stuck her hand in an exposed light switch and felt a \"shock\". When this incident occurred the patient admits that she had a ground level fall. When she fell she did not sustain any head trauma and denies any loss of consciousness. However, she does admit that she fell on her left hip and has since been experiencing mild chest discomfort. Patient explains that she started experiencing this chest pain shortly after she was electrocuted and she describes this as a chest \"heaviness\". She denies any difficulty breathing, nausea, vomiting, urinary changes, blood in stool, or leg swelling recently. She currently denies any hip pain or back pain.  She also is not currently taking any blood thinning medications. She does admit to drinking alcohol every evening but she has not had any alcohol today. Patient denies a history of alcohol withdrawals.     REVIEW OF SYSTEMS  Pertinent positive include ground level fall, electrocution, chest pain. Pertinent negative include difficulty breathing, nausea, vomiting, urinary changes, blood in stool, back pain, hip pain, " loss of consciousness, head trauma, or leg swelling. All other systems reviewed and are negative.      PAST MEDICAL HISTORY   has a past medical history of Arthritis; At high risk for falls; Hepatitis C; High cholesterol; HTN (hypertension); Seasonal allergies; Thyroid disease; and Unspecified cataract.    SOCIAL HISTORY  Social History     Social History Main Topics   • Smoking status: Not on file   • Smokeless tobacco: Not on file   • Alcohol use Yes      Comment: 6x week   • Drug use: No   • Sexual activity: Not on file       SURGICAL HISTORY   has a past surgical history that includes breast biopsy; thyroidectomy; hip arthroplasty total (2007); cataract phaco with iol (6/25/2014); cataract phaco with iol (7/9/2014); gastroscopy-endo (3/1/2017); gastroscopy with sclerotherapy (3/1/2017); and gastroscopy (N/A, 3/6/2017).    CURRENT MEDICATIONS  Home Medications     Reviewed by Willi Kearney R.N. (Registered Nurse) on 09/21/18 at 1825  Med List Status: Not Addressed   Medication Last Dose Status   amlodipine (NORVASC) 10 MG Tab  Active   carvedilol (COREG) 12.5 MG TABS Not Taking Active   ciprofloxacin (CIPRO) 500 MG TABS Not Taking Active   diazepam (VALIUM) 5 MG TABS  Active   folic acid (FOLVITE) 1 MG Tab  Active   ibuprofen (MOTRIN) 600 MG TABS  Active   levothyroxine (SYNTHROID) 112 MCG Tab 2/28/2017 Active   levothyroxine (SYNTHROID) 112 MCG TABS Taking Active   moexipril (UNIVASC) 15 MG Tab 2/28/2017 Active   nystatin (MYCOSTATIN) Powder  Active   omeprazole (PRILOSEC) 20 MG delayed-release capsule  Active   oxycodone-acetaminophen (PERCOCET) 7.5-325 MG per tablet  Active   potassium chloride SA (KDUR) 20 MEQ Tab CR  Active   prednisoLONE acetate (PRED FORTE) 1 % SUSP Not Taking Active   prednisoLONE acetate (PRED FORTE) 1 % SUSP Not Taking Active                ALLERGIES  Allergies   Allergen Reactions   • Codeine Rash   • Codeine        PHYSICAL EXAM   VITAL SIGNS: /66   Pulse (!) 120   Temp  "36.6 °C (97.9 °F)   Resp 16   Ht 1.651 m (5' 5\")   Wt 54.4 kg (120 lb)   BMI 19.97 kg/m²    Constitutional: Elderly chronically ill-appearing female. Alert in no apparent distress.  HENT: Normocephalic, Atraumatic. Bilateral external ears normal. Nose normal.  Moist mucous membranes.  Oropharynx clear.  Eyes: Pupils are equal and reactive. Conjunctiva normal.   Neck: Supple, full range of motion  Heart: Regular rhythm. Tachycardic .  No murmurs.    Lungs: No respiratory distress, normal work of breathing. Crackles at the right base.  Abdomen Soft, no distention.  No tenderness to palpation.  Rectal: Obvious melena on exam.   Musculoskeletal: Atraumatic. No obvious deformities noted.  No lower extremity edema.  Skin: Warm, Dry.  No erythema, No rash.  Pallor noted.  Neurologic: Alert and oriented x3. Moving all extremities spontaneously without focal deficits.  Psychiatric: Affect normal, Mood normal, Appears appropriate and not intoxicated.    DIAGNOSTIC STUDIES    EKG  EKG personally reviewed by myself in the absence of a cardiologist showed:  Sinus tachycardia with rate of 122  Right bundle branch block   multiple PACs present   Prolonged QT at 513  No ST or T wave change  No signficant change from prior on 03/01/17  Impression: unchanged EKG       LABS  Personally reviewed by me  Labs Reviewed   CBC WITH DIFFERENTIAL - Abnormal; Notable for the following:        Result Value    RBC 1.57 (*)     Hemoglobin 5.7 (*)     Hematocrit 18.4 (*)     .2 (*)     MCH 36.3 (*)     MCHC 31.0 (*)     RDW 62.4 (*)     Platelet Count 450 (*)     Neutrophils-Polys 79.20 (*)     Lymphocytes 9.50 (*)     Neutrophils (Absolute) 7.94 (*)     Lymphs (Absolute) 0.95 (*)     Monos (Absolute) 1.00 (*)     All other components within normal limits   BASIC METABOLIC PANEL - Abnormal; Notable for the following:     Co2 12 (*)     Glucose 139 (*)     Bun 67 (*)     Creatinine 1.45 (*)     Anion Gap 28.0 (*)     All other components " "within normal limits   PROTHROMBIN TIME - Abnormal; Notable for the following:     PT 14.7 (*)     INR 1.14 (*)     All other components within normal limits    Narrative:     Indicate which anticoagulants the patient is on:->UNKNOWN   APTT - Abnormal; Notable for the following:     APTT 23.1 (*)     All other components within normal limits    Narrative:     Indicate which anticoagulants the patient is on:->UNKNOWN   ESTIMATED GFR - Abnormal; Notable for the following:     GFR If  43 (*)     GFR If Non  36 (*)     All other components within normal limits   TROPONIN   HEPATIC FUNCTION PANEL   DIAGNOSTIC ALCOHOL   TSH   COD (ADULT)   MAGNESIUM   LACTIC ACID   URINALYSIS,CULTURE IF INDICATED           RADIOLOGY  Personally reviewed by me  CT-HEAD W/O   Final Result      1. Cerebral atrophy.   2. White matter lucencies most consistent with small vessel ischemic change versus demyelination or gliosis.   3. Otherwise, Head CT without contrast with no acute findings. No evidence of acute cerebral infarction, hemorrhage or mass lesion.      DX-CHEST-PORTABLE (1 VIEW)    (Results Pending)       ED COURSE  Vitals:    09/21/18 1821 09/21/18 1824   BP:  105/66   Pulse:  (!) 120   Resp:  16   Temp:  36.6 °C (97.9 °F)   Height: 1.651 m (5' 5\")          Medications administered:  Medications   NS infusion (not administered)   pantoprazole (PROTONIX) 80 mg in  mL IVPB (not administered)   pantoprazole (PROTONIX) 80 mg in  mL Infusion (not administered)   NS infusion 1,000 mL (1,000 mL Intravenous New Bag 9/21/18 1920)       Old records personally reviewed:  Patient was admitted in March for significant upper GI bleed due to duodenal artery requiring embolization by IR.    6:30 PM Patient seen and examined at bedside. The patient presents with ground level fall. Ordered for CT-head without contrast, DX-chest 1 view, PTT, APTT, COD, hepatic function, lactic acid, urinalysis culture, " troponin, CBC with differential, BMP, Diagnostic alcohol, TSH, estimated GFR, magnesium, EKGto evaluate. Patient will be treated with 1000 mL NS infusion secondary to tachcyardia and possible dehydration.     MEDICAL DECISION MAKING  Patient with history of significant upper GI bleed in addition to chronic alcohol abuse who presents after a ground-level fall the patient believes was sustained after shocking herself from a home light socket.  She is afebrile, tachycardic and borderline hypotensive on arrival.  She denies history of syncope or loss of consciousness.  She has no focal neurologic deficits concerning for CVA.  CT of the head is negative for intracranial hemorrhage or skull fracture.  EKG demonstrates no significant change from prior, troponin is negative, doubt ACS.  Patient has evidence of significant anemia without associated coagulopathy.  Rectal exam does demonstrate gross melena.  Plan for fluid resuscitation and blood transfusion in addition to admission to the hospital.    7:50 PM - I discussed the patient's case and the above findings with Dr. Maharaj (GI specialist) who explains that he will scope the patient in the morning and advised that she be admitted to the ICU.     7:52 PM - Ordered Basic TEG.  Patient will be treated with 80 mg Protonix in  mL Infusion, 80 mg Protonix in  mL IVPB, NS infusion.     8:04 PM I discussed the patient's case and the above findings with Dr. Marina ([facility] ICU) who agrees to admit the patient.  Patient is in critical condition at the time of transfer to the ICU.    CRITICAL CARE  Upon my evaluation, this patient had a high probability of imminent or life-threatening deterioration due to acute blood loss anemia requiring blood transfusion likely due to upper GI bleed which required my direct attention, intervention, and personal management.     I personally provided 40 minutes of total critical care time outside of time spent on separately  billable/documented procedures. Time includes: review of laboratory data, review of radiology studies, discussion with consultants, discussion with family/patient, monitoring for potential decompensation.  Interventions were performed as documented above.         DISPOSITION:  Patient will be admitted to [facility],  in critical condition.        IMPRESSION  (K92.2) Upper GI bleed  (D64.9) Anemia, unspecified type  (N17.9) STEFAN (acute kidney injury) (HCC)    Results, diagnoses, and treatment options were discussed with the patient and/or family. Patient verbalized understanding of plan of care.   Sean GROVER (Scribe), am scribing for, and in the presence of, Elizabeth Clemens M.D..    Electronically signed by: Sean Han (Scribe), 9/21/2018    Elizabeth GROVER M.D. personally performed the services described in this documentation, as scribed by Sean Han in my presence, and it is both accurate and complete. C.     The note accurately reflects work and decisions made by me.  Elizabeth Clemens  9/22/2018  1:34 AM

## 2018-09-22 NOTE — PROGRESS NOTES
Pre-OP Checklist.   Allergies verified  NPO since 0000 09/22.   Piedra placed.  Arm band verified Left wrist.   No jewelery or dentures in place.

## 2018-09-22 NOTE — PROGRESS NOTES
Patient brought back to T627 on monitor with RN. Call light in reach , bed alarm on, all patient concerns notified at this time.

## 2018-09-22 NOTE — PROGRESS NOTES
"[facility] ICU Progress Note      Admit Date: 9/21/2018  ICU Day: 1  [] 1      Resident(s): Dunia Vega  Attending: [facility]/ Dr. Owen Lawrence    Date & Time:   9/22/2018   8:26 AM       Patient ID:    Name:             Shannon Balbuena   YOB: 1950  Age:                 68 y.o.  female   MRN:               [id number]    HPI:  Per Dr. Marina's note 9/21:   \"Patient is a 68-year-old lady who was brought in due to a ground-level fall that happened earlier today after receiving a shock from an outlet in her home.  She denies any loss of consciousness.  Patient states she has associated generalized malaise/fatigue, SOB, weakness and decreased appetite.  She also hesitantly stated that she has been having dark stool and coffee-ground emesis for the past 3 months.  She denies taking any NSAIDs, but does endorse drinking 2 shots of bourbon every night - about 4 ounces each.  She has a history of a significant upper GI bleed with duodenal ulcer clipping in 2017.  Patient has not had follow-up with GI since then.\"     Consultants:  GI:  Dr. Maharaj  PMA: Dr. Lawrence     Procedures:  -central line placement, 9/21    Imaging:  DX-CHEST-PORTABLE (1 VIEW)   Final Result         Left central venous catheter with tip in the mid SVC.      DX-CHEST-PORTABLE (1 VIEW)   Final Result         1. No acute cardiopulmonary abnormalities are identified.      DX-CHEST-PORTABLE (1 VIEW)   Final Result         1. No acute cardiopulmonary abnormalities are identified.      CT-HEAD W/O   Final Result      1. Cerebral atrophy.   2. White matter lucencies most consistent with small vessel ischemic change versus demyelination or gliosis.   3. Otherwise, Head CT without contrast with no acute findings. No evidence of acute cerebral infarction, hemorrhage or mass lesion.      US-ABDOMEN COMPLETE SURVEY    (Results Pending)         Interval Events:  -EGD today  -bhardwaj placed due to urinary retention, 2nd PIV placed, " continuing to monitor VS and UOP  -continuing to monitor CIWA scores  -started on levophed early this morning due to persistent hypotension, as well as 125/hr NS  -received 2U pRBCs overnight, with improvement Hgb from 5.7 to 7.7    Review of Systems   Constitutional: Positive for malaise/fatigue.        Unsure whether weight loss. Does not measure and states she wears baggy clothes so would not notice a difference in how they fit   Eyes: Negative for blurred vision and double vision.   Respiratory: Negative for cough and shortness of breath.    Cardiovascular: Negative for chest pain and palpitations.   Gastrointestinal: Positive for melena. Negative for abdominal pain, nausea and vomiting.   Genitourinary: Negative for dysuria and hematuria.        Note: there is urinary retention, but patient is not aware   Musculoskeletal: Negative for joint pain and myalgias.   Neurological: Positive for weakness. Negative for dizziness and headaches.   Psychiatric/Behavioral:        Alcohol abuse.        Physical Exam   Constitutional: She appears well-developed. No distress.   HENT:   Head: Normocephalic and atraumatic.   Eyes: Conjunctivae are normal. Right eye exhibits no discharge. Left eye exhibits no discharge. No scleral icterus.   Cardiovascular: Normal rate, regular rhythm and normal heart sounds.  Exam reveals no gallop and no friction rub.    No murmur heard.  Pulmonary/Chest: Effort normal and breath sounds normal. No respiratory distress. She has no wheezes. She has no rales.   Abdominal: Soft. Bowel sounds are normal. She exhibits no distension. There is no tenderness. There is no rebound and no guarding.   Non-distended.   Musculoskeletal: She exhibits no edema, tenderness or deformity.   Neurological: She is alert.   Oriented to person, place, situation but not to time (could not state month or year)   Skin: Skin is warm and dry. No rash noted. She is not diaphoretic. No erythema. There is pallor.    Psychiatric: Her behavior is normal. Thought content normal.   Flat affect       Respiratory:     Respiration: 14, Pulse Oximetry: 95 %, O2 Daily Delivery Respiratory : Room Air with O2 Available    Chest Tube Drains: N/A          Invalid input(s): [id number]    HemoDynamics:  Pulse: 69, Heart Rate (Monitored): 70 Blood Pressure : (!) 85/48, NIBP: (!) 81/46      Fluids:      Intake/Output Summary (Last 24 hours) at 18 0826  Last data filed at 18 0600   Gross per 24 hour   Intake          3427.92 ml   Output                0 ml   Net          3427.92 ml       Weight: 55.2 kg (121 lb 11.1 oz)  Body mass index is 20.25 kg/m².    Recent Labs      18   0531   SODIUM  143  145   POTASSIUM  4.2  3.8   CHLORIDE  103  110   CO2  12*  14*   BUN  67*  59*   CREATININE  1.45*  1.28   MAGNESIUM  1.6  2.1   PHOSPHORUS   --   4.4   CALCIUM  9.2  7.8*       GI/Nutrition:  Recent Labs      18   0531   ALTSGPT  9   --    ASTSGOT  20   --    ALKPHOSPHAT  72   --    TBILIRUBIN  0.7   --    DBILIRUBIN  0.3   --    GLUCOSE  139*  89       Heme:  Recent Labs      18   0000  18   0011  18   0531   RBC   --   1.57*   --   2.33*   HEMOGLOBIN   --   5.7*  6.9*  7.7*   HEMATOCRIT   --   18.4*  20.7*  23.2*   PLATELETCT   --   450*   --   267   PROTHROMBTM   --   14.7*   --    --    APTT   --   23.1*   --    --    INR   --   1.14*   --    --    IRON  129  89   --    --    TOTIRONBC  371  396   --    --        Infectious Disease:  Temp  Av.8 °C (98.2 °F)  Min: 36.5 °C (97.7 °F)  Max: 37.5 °C (99.5 °F)  Recent Labs      18   0531   WBC  10.0  5.4   NEUTSPOLYS  79.20*   --    LYMPHOCYTES  9.50*   --    MONOCYTES  10.00   --    EOSINOPHILS  0.10   --    BASOPHILS  0.50   --    ASTSGOT  20   --    ALTSGPT  9   --    ALKPHOSPHAT  72   --    TBILIRUBIN  0.7   --        Meds:  • LR  1,000 mL 1,000 mL (18 0300)   • LR  250  "mL     • NORepinephrine (LEVOPHED) infusion  0-30 mcg/min 8 mcg/min (09/22/18 0731)   • NS       • pantoprazole (PROTONIX) infusion  8 mg/hr 8 mg/hr (09/22/18 0740)   • MD Alert...Adult ICU Electrolyte Replacement per Pharmacy       • senna-docusate  2 Tab      And   • polyethylene glycol/lytes  1 Packet      And   • magnesium hydroxide  30 mL      And   • bisacodyl  10 mg     • Respiratory Care per Protocol       • labetalol  10 mg     • levothyroxine  112 mcg     • thiamine  100 mg     • folic acid  1 mg     • LORazepam  0.5 mg     • LORazepam  1 mg      Or   • LORazepam  0.5 mg     • LORazepam  2 mg      Or   • LORazepam  1 mg     • LORazepam  3 mg      Or   • LORazepam  1.5 mg     • LORazepam  4 mg      Or   • LORazepam  2 mg     • multivitamin  1 Tab     • cyanocobalamin  1,000 mcg            Assessment and Plan:  * Upper GI bleeding- (present on admission)   Assessment & Plan    Melena and hematemesis for 3 months - note: this morning she denied this has been going on for 3 months and stated it began \"recently,\" but she is not oriented to time and could not say how long she has had the symptoms for  No NSAIDs, but has been drinking [place] daily  Hb 5.7 improved to 7.7 at latest check 0530 this am following 2U pRBCs    Plan:  PRBC when Hb <7  Trend H&H Q6H  Protonix drip with bolus  GI following, will perform EGD today in the morning  SCDs, no NSAIDs or pharmx DVT prophx        Prolonged Q-T interval on ECG   Assessment & Plan    QTc 513  Serial EKGs  Avoid QTc prolonging meds when possible        STEFAN (acute kidney injury) (HCC)   Assessment & Plan    Initially presumed to be prerenal; however this am bladder scan showed 800 cc urine in bladder and she was not aware of bladder fullness. Piedra placed and urine Cr and Na ordered to get FeNa as post-obstructive or mixed etiology STEFAN (or STEFAN on CKD) now also a possibility  IVF - received bolus overnight, now also begun on 125/hr NS  Monitor UOP        Hypotension " due to blood loss- (present on admission)   Assessment & Plan    -hypotensive overnight due to GI bleed  -central line placed overnight, and received bolus crystalloid + 2U pRBCs  -due to continued hypotension early this morning began receiving levophed  -125/hr NS also begun this morning        Macrocytosis   Assessment & Plan    Pending Folate and VitB12        Alcohol use   Assessment & Plan    Has drunk 2, 4-5oz bourban a night for many years  [facility] protocol in place  Thiamine/Folate/MV  -B12 and folate wnl, though folate was low normal  -given high suspicion for deficiency, ordered homocysteine. May consider ordering methylmalonic acid as well.         Chronic hepatitis C virus infection (HCC)   Assessment & Plan    Untreated  Visceral angiogram in 2017 shows signs of portal htn  ABD US pending   -will f/u US today. Given exam, doubtful there will be enough to tap, so will start on empiric ceftriaxone as above if pos for small amount ascites        Chronic hypertension- (present on admission)   Assessment & Plan    Currently not a problem; in fact patient has been hypotensive in setting of GI bleed  Hold BP meds in setting of GI bleed        Severe protein-calorie malnutrition (HCC)- (present on admission)   Assessment & Plan    Has had little appetite   Is deconditioned  Nutrition consult for supplement          Hypothyroid- (present on admission)   Assessment & Plan    Stable, chronic condition  TSH 4.7  Cont levothyroxine                Quality Measures:  Lines: Central line, PIV x2     Piedra Catheter: yes (urinary retention)    DVT Prophylaxis: held for now given GI bleed  Ulcer Prophylaxis: IV protonix    Antibiotics: none for now       CODE STATUS: FULL CODE  DISPO: To remain in ICU, critical condition         .

## 2018-09-22 NOTE — ASSESSMENT & PLAN NOTE
FeNa .2%, confirming prerenal STEFAN which is now improving s/p boluses + continued IVF.   Cr today down to 1.03 from 1.28  On plasmalyte infusion, 125/hr  Monitor UOP

## 2018-09-22 NOTE — ASSESSMENT & PLAN NOTE
Melena and hematemesis for 3 months  No NSAIDs, but has been drinking [place] daily  Hb 5.7 on initial admit, received 2 units PRBCs   Hb stable   Now off pressors   EGD: clean-based chronic appearing duodenal ulcer, took bx    Plan:  PRBC when Hb <7  H&H QD  Long term high dose PPI per GI; will also continue sucralfate at discharge  SCDs, no NSAIDs or pharmx DVT prophx  Diet advanced 9/24 to regular  At discharge will need f/u appt with Dr. Maharaj both re: UGI bleed and for chronic HCV

## 2018-09-22 NOTE — ED TRIAGE NOTES
"Chief Complaint   Patient presents with   • GLF     PT BELIEVES SHE STUCK HER HAND INTO A LIGHT SWITCH, WAS SHOCKED AND THEN FELL TO THE GROUND.  NO LOC, NON-AMBULATORY AT SCENE, DENIES ANY INJURY, NO LEG SHORTENING OR EXT ROTATION, PT IS VERY PALE AND PER  HAS HAD \"DETERIORATING HEALTH\", HX OF GI BLEED, DENIES BLEEDING       "

## 2018-09-23 PROBLEM — D53.9 MACROCYTIC ANEMIA: Status: ACTIVE | Noted: 2018-09-21

## 2018-09-23 PROBLEM — E83.42 HYPOMAGNESEMIA: Status: ACTIVE | Noted: 2018-09-23

## 2018-09-23 PROBLEM — E87.6 HYPOKALEMIA: Status: ACTIVE | Noted: 2018-09-23

## 2018-09-23 LAB
ANION GAP SERPL CALC-SCNC: 6 MMOL/L (ref 0–11.9)
ANISOCYTOSIS BLD QL SMEAR: ABNORMAL
BASOPHILS # BLD AUTO: 0.3 % (ref 0–1.8)
BASOPHILS # BLD: 0.02 K/UL (ref 0–0.12)
BUN SERPL-MCNC: 36 MG/DL (ref 8–22)
CALCIUM SERPL-MCNC: 7.3 MG/DL (ref 8.5–10.5)
CHLORIDE SERPL-SCNC: 116 MMOL/L (ref 96–112)
CO2 SERPL-SCNC: 20 MMOL/L (ref 20–33)
COMMENT 1642: NORMAL
CREAT SERPL-MCNC: 1.06 MG/DL (ref 0.5–1.4)
EKG IMPRESSION: NORMAL
EOSINOPHIL # BLD AUTO: 0.14 K/UL (ref 0–0.51)
EOSINOPHIL NFR BLD: 2.3 % (ref 0–6.9)
ERYTHROCYTE [DISTWIDTH] IN BLOOD BY AUTOMATED COUNT: 76.6 FL (ref 35.9–50)
ERYTHROCYTE [DISTWIDTH] IN BLOOD BY AUTOMATED COUNT: 83.2 FL (ref 35.9–50)
GLUCOSE SERPL-MCNC: 98 MG/DL (ref 65–99)
HCT VFR BLD AUTO: 22.5 % (ref 37–47)
HCT VFR BLD AUTO: 24.8 % (ref 37–47)
HGB BLD-MCNC: 7.8 G/DL (ref 12–16)
HGB BLD-MCNC: 8 G/DL (ref 12–16)
IMM GRANULOCYTES # BLD AUTO: 0.05 K/UL (ref 0–0.11)
IMM GRANULOCYTES NFR BLD AUTO: 0.8 % (ref 0–0.9)
LYMPHOCYTES # BLD AUTO: 1.2 K/UL (ref 1–4.8)
LYMPHOCYTES NFR BLD: 19.5 % (ref 22–41)
MACROCYTES BLD QL SMEAR: ABNORMAL
MAGNESIUM SERPL-MCNC: 1.4 MG/DL (ref 1.5–2.5)
MCH RBC QN AUTO: 33.5 PG (ref 27–33)
MCH RBC QN AUTO: 35 PG (ref 27–33)
MCHC RBC AUTO-ENTMCNC: 32.3 G/DL (ref 33.6–35)
MCHC RBC AUTO-ENTMCNC: 34.7 G/DL (ref 33.6–35)
MCV RBC AUTO: 100.9 FL (ref 81.4–97.8)
MCV RBC AUTO: 103.8 FL (ref 81.4–97.8)
MICROCYTES BLD QL SMEAR: ABNORMAL
MONOCYTES # BLD AUTO: 0.76 K/UL (ref 0–0.85)
MONOCYTES NFR BLD AUTO: 12.4 % (ref 0–13.4)
MORPHOLOGY BLD-IMP: NORMAL
NEUTROPHILS # BLD AUTO: 3.97 K/UL (ref 2–7.15)
NEUTROPHILS NFR BLD: 64.7 % (ref 44–72)
NRBC # BLD AUTO: 0.04 K/UL
NRBC BLD-RTO: 0.7 /100 WBC
PHOSPHATE SERPL-MCNC: 3 MG/DL (ref 2.5–4.5)
PLATELET # BLD AUTO: 213 K/UL (ref 164–446)
PLATELET # BLD AUTO: 222 K/UL (ref 164–446)
PLATELET BLD QL SMEAR: NORMAL
PMV BLD AUTO: 9.4 FL (ref 9–12.9)
PMV BLD AUTO: 9.6 FL (ref 9–12.9)
POLYCHROMASIA BLD QL SMEAR: NORMAL
POTASSIUM SERPL-SCNC: 3.4 MMOL/L (ref 3.6–5.5)
RBC # BLD AUTO: 2.23 M/UL (ref 4.2–5.4)
RBC # BLD AUTO: 2.39 M/UL (ref 4.2–5.4)
RBC BLD AUTO: PRESENT
SODIUM SERPL-SCNC: 142 MMOL/L (ref 135–145)
WBC # BLD AUTO: 5.5 K/UL (ref 4.8–10.8)
WBC # BLD AUTO: 6.1 K/UL (ref 4.8–10.8)

## 2018-09-23 PROCEDURE — 700105 HCHG RX REV CODE 258: Performed by: ANESTHESIOLOGY

## 2018-09-23 PROCEDURE — A9270 NON-COVERED ITEM OR SERVICE: HCPCS | Performed by: STUDENT IN AN ORGANIZED HEALTH CARE EDUCATION/TRAINING PROGRAM

## 2018-09-23 PROCEDURE — 93010 ELECTROCARDIOGRAM REPORT: CPT | Performed by: INTERNAL MEDICINE

## 2018-09-23 PROCEDURE — 83735 ASSAY OF MAGNESIUM: CPT

## 2018-09-23 PROCEDURE — 700102 HCHG RX REV CODE 250 W/ 637 OVERRIDE(OP): Performed by: INTERNAL MEDICINE

## 2018-09-23 PROCEDURE — 85025 COMPLETE CBC W/AUTO DIFF WBC: CPT

## 2018-09-23 PROCEDURE — A9270 NON-COVERED ITEM OR SERVICE: HCPCS | Performed by: INTERNAL MEDICINE

## 2018-09-23 PROCEDURE — 700105 HCHG RX REV CODE 258: Performed by: STUDENT IN AN ORGANIZED HEALTH CARE EDUCATION/TRAINING PROGRAM

## 2018-09-23 PROCEDURE — 80048 BASIC METABOLIC PNL TOTAL CA: CPT

## 2018-09-23 PROCEDURE — 700111 HCHG RX REV CODE 636 W/ 250 OVERRIDE (IP): Performed by: INTERNAL MEDICINE

## 2018-09-23 PROCEDURE — A9270 NON-COVERED ITEM OR SERVICE: HCPCS | Performed by: ANESTHESIOLOGY

## 2018-09-23 PROCEDURE — 700105 HCHG RX REV CODE 258: Performed by: INTERNAL MEDICINE

## 2018-09-23 PROCEDURE — 85027 COMPLETE CBC AUTOMATED: CPT

## 2018-09-23 PROCEDURE — 84100 ASSAY OF PHOSPHORUS: CPT

## 2018-09-23 PROCEDURE — 700102 HCHG RX REV CODE 250 W/ 637 OVERRIDE(OP): Performed by: STUDENT IN AN ORGANIZED HEALTH CARE EDUCATION/TRAINING PROGRAM

## 2018-09-23 PROCEDURE — 99233 SBSQ HOSP IP/OBS HIGH 50: CPT | Performed by: ANESTHESIOLOGY

## 2018-09-23 PROCEDURE — C9113 INJ PANTOPRAZOLE SODIUM, VIA: HCPCS | Performed by: INTERNAL MEDICINE

## 2018-09-23 PROCEDURE — 700102 HCHG RX REV CODE 250 W/ 637 OVERRIDE(OP): Performed by: ANESTHESIOLOGY

## 2018-09-23 PROCEDURE — 770001 HCHG ROOM/CARE - MED/SURG/GYN PRIV*

## 2018-09-23 PROCEDURE — 93005 ELECTROCARDIOGRAM TRACING: CPT | Performed by: INTERNAL MEDICINE

## 2018-09-23 PROCEDURE — 700111 HCHG RX REV CODE 636 W/ 250 OVERRIDE (IP): Performed by: STUDENT IN AN ORGANIZED HEALTH CARE EDUCATION/TRAINING PROGRAM

## 2018-09-23 RX ORDER — MAGNESIUM SULFATE HEPTAHYDRATE 40 MG/ML
4 INJECTION, SOLUTION INTRAVENOUS ONCE
Status: COMPLETED | OUTPATIENT
Start: 2018-09-23 | End: 2018-09-23

## 2018-09-23 RX ORDER — POTASSIUM CHLORIDE 20 MEQ/1
40 TABLET, EXTENDED RELEASE ORAL ONCE
Status: COMPLETED | OUTPATIENT
Start: 2018-09-23 | End: 2018-09-23

## 2018-09-23 RX ORDER — SODIUM CHLORIDE 9 MG/ML
INJECTION, SOLUTION INTRAVENOUS CONTINUOUS
Status: DISCONTINUED | OUTPATIENT
Start: 2018-09-23 | End: 2018-09-23

## 2018-09-23 RX ORDER — SODIUM CHLORIDE, SODIUM GLUCONATE, SODIUM ACETATE, POTASSIUM CHLORIDE AND MAGNESIUM CHLORIDE 526; 502; 368; 37; 30 MG/100ML; MG/100ML; MG/100ML; MG/100ML; MG/100ML
1000 INJECTION, SOLUTION INTRAVENOUS ONCE
Status: COMPLETED | OUTPATIENT
Start: 2018-09-23 | End: 2018-09-23

## 2018-09-23 RX ORDER — OMEPRAZOLE 20 MG/1
20 CAPSULE, DELAYED RELEASE ORAL 2 TIMES DAILY
Status: DISCONTINUED | OUTPATIENT
Start: 2018-09-23 | End: 2018-09-26 | Stop reason: HOSPADM

## 2018-09-23 RX ORDER — ACETAMINOPHEN 325 MG/1
650 TABLET ORAL EVERY 6 HOURS PRN
Status: DISCONTINUED | OUTPATIENT
Start: 2018-09-23 | End: 2018-09-26 | Stop reason: HOSPADM

## 2018-09-23 RX ORDER — SUCRALFATE ORAL 1 G/10ML
1 SUSPENSION ORAL
Status: DISCONTINUED | OUTPATIENT
Start: 2018-09-23 | End: 2018-09-26 | Stop reason: HOSPADM

## 2018-09-23 RX ORDER — SODIUM CHLORIDE, SODIUM GLUCONATE, SODIUM ACETATE, POTASSIUM CHLORIDE AND MAGNESIUM CHLORIDE 526; 502; 368; 37; 30 MG/100ML; MG/100ML; MG/100ML; MG/100ML; MG/100ML
INJECTION, SOLUTION INTRAVENOUS CONTINUOUS
Status: DISCONTINUED | OUTPATIENT
Start: 2018-09-23 | End: 2018-09-24

## 2018-09-23 RX ADMIN — SODIUM CHLORIDE: 9 INJECTION, SOLUTION INTRAVENOUS at 04:33

## 2018-09-23 RX ADMIN — THIAMINE HCL TAB 100 MG 100 MG: 100 TAB at 05:34

## 2018-09-23 RX ADMIN — MAGNESIUM SULFATE IN WATER 4 G: 40 INJECTION, SOLUTION INTRAVENOUS at 08:07

## 2018-09-23 RX ADMIN — SODIUM CHLORIDE, SODIUM GLUCONATE, SODIUM ACETATE, POTASSIUM CHLORIDE AND MAGNESIUM CHLORIDE 1000 ML: 526; 502; 368; 37; 30 INJECTION, SOLUTION INTRAVENOUS at 08:06

## 2018-09-23 RX ADMIN — SUCRALFATE 1 G: 1 SUSPENSION ORAL at 10:45

## 2018-09-23 RX ADMIN — FOLIC ACID 1 MG: 1 TABLET ORAL at 05:34

## 2018-09-23 RX ADMIN — CYANOCOBALAMIN 1000 MCG: 1000 INJECTION, SOLUTION INTRAMUSCULAR; SUBCUTANEOUS at 18:05

## 2018-09-23 RX ADMIN — LEVOTHYROXINE SODIUM 112 MCG: 112 TABLET ORAL at 05:34

## 2018-09-23 RX ADMIN — OMEPRAZOLE 20 MG: 20 CAPSULE, DELAYED RELEASE ORAL at 10:44

## 2018-09-23 RX ADMIN — OMEPRAZOLE 20 MG: 20 CAPSULE, DELAYED RELEASE ORAL at 18:05

## 2018-09-23 RX ADMIN — SODIUM CHLORIDE, SODIUM GLUCONATE, SODIUM ACETATE, POTASSIUM CHLORIDE AND MAGNESIUM CHLORIDE: 526; 502; 368; 37; 30 INJECTION, SOLUTION INTRAVENOUS at 21:33

## 2018-09-23 RX ADMIN — ACETAMINOPHEN 650 MG: 325 TABLET, FILM COATED ORAL at 16:43

## 2018-09-23 RX ADMIN — SODIUM CHLORIDE 8 MG/HR: 9 INJECTION, SOLUTION INTRAVENOUS at 04:33

## 2018-09-23 RX ADMIN — POTASSIUM CHLORIDE 40 MEQ: 1500 TABLET, EXTENDED RELEASE ORAL at 08:07

## 2018-09-23 RX ADMIN — SODIUM CHLORIDE, SODIUM GLUCONATE, SODIUM ACETATE, POTASSIUM CHLORIDE AND MAGNESIUM CHLORIDE: 526; 502; 368; 37; 30 INJECTION, SOLUTION INTRAVENOUS at 08:00

## 2018-09-23 RX ADMIN — THERA TABS 1 TABLET: TAB at 05:34

## 2018-09-23 RX ADMIN — SUCRALFATE 1 G: 1 SUSPENSION ORAL at 18:05

## 2018-09-23 RX ADMIN — SUCRALFATE 1 G: 1 SUSPENSION ORAL at 20:00

## 2018-09-23 ASSESSMENT — ENCOUNTER SYMPTOMS
ABDOMINAL PAIN: 0
DIZZINESS: 0
DOUBLE VISION: 0
ENDOCRINE NEGATIVE: 1
SHORTNESS OF BREATH: 0
MUSCULOSKELETAL NEGATIVE: 1
GASTROINTESTINAL NEGATIVE: 1
HEADACHES: 0
WEAKNESS: 1
CARDIOVASCULAR NEGATIVE: 1
ALLERGIC/IMMUNOLOGIC NEGATIVE: 1
BLOOD IN STOOL: 0
HEARTBURN: 0
RESPIRATORY NEGATIVE: 1
DIARRHEA: 0
HEMATOLOGIC/LYMPHATIC NEGATIVE: 1
PALPITATIONS: 0
BLURRED VISION: 0
PSYCHIATRIC NEGATIVE: 1
VOMITING: 0
CONSTITUTIONAL NEGATIVE: 1
EYES NEGATIVE: 1
NEUROLOGICAL NEGATIVE: 1
NAUSEA: 0
MYALGIAS: 0
COUGH: 0

## 2018-09-23 ASSESSMENT — PAIN SCALES - GENERAL
PAINLEVEL_OUTOF10: 0
PAINLEVEL_OUTOF10: 4
PAINLEVEL_OUTOF10: 0

## 2018-09-23 NOTE — CARE PLAN
Problem: Skin Integrity  Goal: Risk for impaired skin integrity will decrease  Outcome: PROGRESSING AS EXPECTED    Intervention: Assess and monitor skin integrity, appearance and/or temperature  Completed. Pt turns self from side to side.       Problem: Respiratory:  Goal: Respiratory status will improve  Outcome: PROGRESSING AS EXPECTED    Intervention: Administer and titrate oxygen therapy  2L NC for sleep.

## 2018-09-23 NOTE — PROGRESS NOTES
"                                                   [facility] ICU Transfer Note                                                                             Attending: Dr Owen Lawrence  Resident: Dr. Dunia Vega  Date of admission: 9/21/2018  Date of transferring out from ICU:  09/23/18      Primary Diagnosis:   - Upper gastrointestinal bleed due to duodenal bulb ulcer    Secondary diagnoses:  -Hypotension due to blood loss, acute, improved  -Acute kidney injury, prerenal due to hypovolemia, improving  -Hypokalemia, acute, moderate  -Hypomagnesemia, acute, moderate  -Macrocytic anemia, chronicity unclear, likely mixed picture blood loss and folate +/- B12 deficiency  -Protein calorie malnutrition, subacute vs chronic  -Hypothyroidism, chronic and stable  -Alcohol abuse, chronic, without evidence of acute withdrawal  -Hepatitis C infection, chronic and untreated  -Hypertension, chronic (not currently problem given acute hypotension)      HPI and ICU Course Summary (Brief Narrative):  Shannon Balbuena is 68 y.o. with a past medical history of chronic untreated Hepatitis C infection; chronic alcohol abuse with 2, 6-8 oz shots of bourbon per night; stable hypothyroidism; chronic hypertension who presented on 9/21 following ground level fall with finding of severe anemia and complaint of melena of unclear chronicity (patient account varies from \"recently\" to \"3 months\").    # Upper gastroinestinal bleed due to duodenal bulb ulcer  -As above, presented on 9/21 following ground level fall with finding of severe anemia and complaint of melena of unclear chronicity (patient account varies from \"recently\" to \"3 months\")  -found in emergency department to have Hgb 5.7 requiring 2 units of packed red blood cells overnight, intravenous fluid boluses, pressors, intravenous protonix. Home anti-hypertensives were held.   -Gastroenterology was consulted and she underwent endoscopy the morning following admission; a " chronic-appearing, non-bleeding duodenal bulb ulcer was found and biopsied; gastroenterology also recommended resection if re-bleed occurs, as well as long-term proton pump inhibitor therapy  -she was continued on fluids and pressors, and on 9/23 morning pressors were discontinued and she was begun on plasmalyte infusion at 125 cc/hour with improvement.   -given blood pressure stabilization she will now transfer to the floor for further management and anticipated discharge    # Macrocytic anemia  -felt to likely represent mixed picture blood loss/iron deficiency anemia plus B12 and/or folate deficiency  -folate and B12 were ordered, both within normal limits (folate at lower end of normal)  -homocysteine also ordered but not yet drawn    # Acute kidney injury, prerenal, improving  -Presented with creatinine of 1.45 which has steadily improved following fluids and stabilization of blood pressure.   -She was also found to have urinary retention the morning following admission, so to clarify etiology, FeNa was calculated at .2% confirming prerenal source, likely due to acute blood loss    # Hypokalemia  # Hypomagnesemia  -found on labs on 9/23 in the morning and repleted  -goal K >4 and Mag >2 given prolonged QTc    # Prolonged QTc interval   -QTc 513 found on initial admission, possibly secondary to alcohol abuse  -serial EKGs performed and most recent shows only mild improvement with QTc 494 on 9/23    # Protein-calorie malnutrition  -felt likely partly due to alcohol abuse, and partly due to patient's recent loss of appetite  -nutrition consulted    CHRONIC PROBLEMS:  -Hypothyroidism: TSH within normal limits; continued on home levothyroxine    -Hypertension: as above, not a problem in setting of current blood loss, and home anti-hypertensives (amlodipine) held. Notably, carvedilol listed on outpatient medications in chart but it is not clear where or when this was prescribed and it seems as though patient is not  aware of/has not been taking this as outpatient.     -Alcohol abuse: CIWA begun but patient has not demonstrated withdrawal symptoms this stay nor required Ativan. Supplementing with thiamine, folate, and multivitamin.    -Chronic Hepatitis C, untreated: abdominal ultrasound performed and demonstrated fatty infiltration of liver and only very trace ascites. Given lack of leukocytosis, only trace ascites, lack of other symptoms, prophylactic ceftriaxone not begun.    Important Events in the ICU:   - Central Line: (Dates of insertion and removal, if applicable): left IJ, 9/21-9/23  - Intubation: (Date of intubation and extubation): N/A  - Pressors: (Type and Duration): levophed  - Piedra catheter (Date of insertion and removal, if applicable): placed 9/22 due to retention  - Tube feeding (Start and stop dates): N/A  - Antibiotics (Type, start and stop dates): N/A  - Other procedures: N/A        Labs and imaging studies to be continued with their indications:  - CBC:  (Indication): following H&H due to blood loss anemia  - CMP or BMP: (Indication): has exhibited hypokalemia, goal >4 given prolonged QTc  - Magnesium: (Indication): has exhibited hypomagnesemia, goal >2 given prolonged QTc    Things to follow:   -homocysteine level. Consider methylmalonic acid also, and patient may be discharged on B12 supplement (in addition to thiamine, folate) as indicated  -can discontinue CIWA if no signs or symptoms 9/24 as patient will be outside of the 48 hour window without exhibiting alcohol withdrawal syndrome or requiring Ativan  -will likely require social work consult to discuss alcohol cessation resources with patient prior to discharge  -will likely require Infectious Disease outpatient referral given her chronic untreated Hepatitis C infection  -will need follow up with Dr. Maharaj (Gastroenterology) as outpatient   -consider Nutrition referral as outpatient given protein-calorie malnutrition

## 2018-09-23 NOTE — PROGRESS NOTES
Gastroenterology Progress Note     Author: David Hinojosa   Date & Time Created: 9/23/2018 9:44 AM    Chief Complaint:  Duodenal ulcer with hemorrhage    Interval History:  EGD yesterday showed a large deep chronic ulcer with exposed coils from her previous embolization. No active bleeding and no high risk stigmata. Biopsies obtained of margin    No BM's today. No abd pain.     Review of Systems:  Review of Systems   Constitutional: Positive for malaise/fatigue.   Gastrointestinal: Negative for blood in stool, diarrhea, heartburn, melena, nausea and vomiting.   Neurological: Positive for weakness.       Physical Exam:  Physical Exam   Constitutional: She is oriented to person, place, and time. She appears well-developed and well-nourished. No distress.   HENT:   Head: Atraumatic.   Eyes: EOM are normal. No scleral icterus.   Abdominal: Soft. She exhibits no distension and no mass. There is no tenderness. There is no rebound and no guarding.   Neurological: She is alert and oriented to person, place, and time.   Skin: Skin is warm and dry. No rash noted. No erythema.   Psychiatric: She has a normal mood and affect. Her behavior is normal. Judgment and thought content normal.       Labs:        Invalid input(s): [id number]  Recent Labs      09/21/18 1816   TROPONINI  0.02     Recent Labs      09/21/18   1816 09/22/18   0531  09/23/18   0430   SODIUM  143  145  142   POTASSIUM  4.2  3.8  3.4*   CHLORIDE  103  110  116*   CO2  12*  14*  20   BUN  67*  59*  36*   CREATININE  1.45*  1.28  1.06   MAGNESIUM  1.6  2.1  1.4*   PHOSPHORUS   --   4.4  3.0   CALCIUM  9.2  7.8*  7.3*     Recent Labs      09/21/18   1816 09/22/18   0531  09/23/18   0430   ALTSGPT  9   --    --    ASTSGOT  20   --    --    ALKPHOSPHAT  72   --    --    TBILIRUBIN  0.7   --    --    DBILIRUBIN  0.3   --    --    GLUCOSE  139*  89  98     Recent Labs      09/21/18   0000  09/21/18   1816   09/22/18   0531  09/22/18   1200  09/22/18   1752   18   0430   RBC   --   1.57*   --   2.33*   --    --   2.23*   HEMOGLOBIN   --   5.7*   < >  7.7*  8.1*  8.2*  7.8*   HEMATOCRIT   --   18.4*   < >  23.2*  24.2*  24.3*  22.5*   PLATELETCT   --   450*   --   267   --    --   213   PROTHROMBTM   --   14.7*   --    --    --    --    --    APTT   --   23.1*   --    --    --    --    --    INR   --   1.14*   --    --    --    --    --    IRON  129  89   --    --    --    --    --    TOTIRONBC  371  396   --    --    --    --    --     < > = values in this interval not displayed.     Recent Labs      18   1816  18   0531  18   0430   WBC  10.0  5.4  5.5   NEUTSPOLYS  79.20*   --    --    LYMPHOCYTES  9.50*   --    --    MONOCYTES  10.00   --    --    EOSINOPHILS  0.10   --    --    BASOPHILS  0.50   --    --    ASTSGOT  20   --    --    ALTSGPT  9   --    --    ALKPHOSPHAT  72   --    --    TBILIRUBIN  0.7   --    --      Hemodynamics:  Temp (24hrs), Av.6 °C (97.8 °F), Min:36.3 °C (97.4 °F), Max:36.8 °C (98.2 °F)  Temperature: 36.8 °C (98.2 °F)  Pulse  Av.4  Min: 42  Max: 120Heart Rate (Monitored): 72  Blood Pressure : 104/50, NIBP: (!) 99/57     Respiratory:    Respiration: 16, Pulse Oximetry: 98 %     Work Of Breathing / Effort: Mild  RUL Breath Sounds: Clear, RML Breath Sounds: Clear, RLL Breath Sounds: Clear;Diminished, YANICK Breath Sounds: Clear, LLL Breath Sounds: Clear;Diminished  Fluids:    Intake/Output Summary (Last 24 hours) at 18 0944  Last data filed at 18 0600   Gross per 24 hour   Intake          3007.77 ml   Output             1150 ml   Net          1857.77 ml        GI/Nutrition:  Orders Placed This Encounter   Procedures   • Diet Order Clear Liquids - No Red Foods     Standing Status:   Standing     Number of Occurrences:   1     Order Specific Question:   Diet:     Answer:   Clear Liquids - No Red Foods [12]     Medical Decision Making, by Problem:  Active Hospital Problems    Diagnosis   • *Upper GI bleeding  [K92.2]   • STEFAN (acute kidney injury) (HCC) [N17.9]   • Prolonged Q-T interval on ECG [R94.31]   • Chronic hepatitis C virus infection (HCC) [B18.2]   • Alcohol use [Z78.9]   • Macrocytosis [D75.89]   • Chronic hypertension [I10]   • Hypothyroid [E03.9]   • Severe protein-calorie malnutrition (HCC) [E43]   • Hypotension due to blood loss [I95.89]     Impression and Plan    1) Duodenal ulcer with bleeding - She will need to stay on long term PPi therapy twice daily and I discussed importance of taking her PPI's 30-60 minutes before meals. She should go home on carafate as well and I will arrange follow up with Dr Maharaj. I am concerned that these exposed coils may serving as a nidus for maintaining her ulcer and hence if there are any additional bleeding or other complications of this ulcer then she may need surgery, but first should be given a chance to see if chronic medical therapy helps.  2) Anemia, acute blood loss  3) melena - resolved  4) Hep c - can address tx options with Dr Ware as outpatient    Will sign off and ok to d/c to home from a GI standpoint.    EMO      Quality-Core Measures

## 2018-09-23 NOTE — PROGRESS NOTES
Critical Care Progress Note    Date of admission  9/21/2018    Chief Complaint  68 y.o. Female with a past medical history of duodenal ulcer, alcohol abuse, hypertension, presented to the hospital for evaluation of coffee-ground emesis and melanotic stool accompanied by generalized weakness over the last 2-3 months.  Stool guaiac positive in ED and hemoglobin of 5.7, following administration of blood transfusion patient was transferred to ICU for monitoring and EGD in the morning.  She denies nausea, chest pain, abdominal pain.      Hospital Course  She was transfused packed red blood cells  She required central line placement for resuscitation and pressors  She had upper endoscopy which revealed a large duodenal bulb ulcer with heaped margins and exposed embolization coils from her previous gastroduodenal artery embolization by IR approximately 1 year ago      Interval Problem Update  Reviewed last 24 hour events:  No significant overnight events  Nor epi weaned to off after additional liter of crystalloid bolus  Sinus rhythm 60-70, systolic blood pressure , afebrile  Urine output 500 cc overnight, no bowel movement, no bleeding, tolerating clears, gastroenterology has signed off  No respiratory issues  No anticoagulation due to GI bleed, now on p.o. proton pump inhibitor  White blood cell count 5.5,  hemoglobin 7.8, BUN decreasing 67-59-36, Cr decreasing 1.5-1.3-1.06, normal saline changed to Plasma-Lyte    Review of Systems  Review of Systems   Constitutional: Negative.    HENT: Negative.    Eyes: Negative.    Respiratory: Negative.    Cardiovascular: Negative.    Gastrointestinal: Negative.    Endocrine: Negative.    Genitourinary: Negative.    Musculoskeletal: Negative.    Skin: Negative.    Allergic/Immunologic: Negative.    Neurological: Negative.    Hematological: Negative.    Psychiatric/Behavioral: Negative.         Vital Signs for last 24 hours   Temp:  [36.3 °C (97.4 °F)-36.8 °C (98.2 °F)] 36.8 °C  (98.2 °F)  Pulse:  [63-96] 72  Resp:  [13-26] 16  BP: (104)/(50) 104/50    Hemodynamic parameters for last 24 hours              Physical Exam   Physical Exam   Constitutional: She is oriented to person, place, and time.   Pale somewhat ill-appearing  Alert denies discomfort shortness of breath chest pain or abdominal pain   HENT:   Head: Normocephalic and atraumatic.   Mouth/Throat: Oropharynx is clear and moist.   Eyes: Pupils are equal, round, and reactive to light.   Neck: Normal range of motion. Neck supple.   Cardiovascular: Normal rate and regular rhythm.    Pulmonary/Chest: Effort normal and breath sounds normal.   Abdominal: Soft.   Musculoskeletal:   Normal inspection   Neurological: She is alert and oriented to person, place, and time.   Skin: Skin is warm and dry.   Psychiatric: She has a normal mood and affect.     I personally performed detailed review of systems and physical exam today 2/23, they were unchanged compared to yesterday 2/22    Medications  Current Facility-Administered Medications   Medication Dose Route Frequency Provider Last Rate Last Dose   • electrolyte-A (PLASMALYTE-A) infusion   Intravenous Continuous Owen Lawrence M.D.       • electrolyte-A (PLASMALYTE-A) infusion 1,000 mL  1,000 mL Intravenous Once Owen Lawrence M.D.       • magnesium sulfate IVPB premix 4 g  4 g Intravenous Once Dunia Vega M.D.       • potassium chloride SA (Kdur) tablet 40 mEq  40 mEq Oral Once Dunia Vega M.D.       • lactated ringers infusion  250 mL Intravenous Once PRN Jazmyn Marina M.D.       • norepinephrine (LEVOPHED) 8 mg in  mL Infusion  0-30 mcg/min Intravenous Continuous Raj Canada M.D. 3.8 mL/hr at 09/23/18 0503 2 mcg/min at 09/23/18 0503   • pantoprazole (PROTONIX) 80 mg in  mL Infusion  8 mg/hr Intravenous Continuous Raj Canada M.D. 25 mL/hr at 09/23/18 0433 8 mg/hr at 09/23/18 0433   • MD Alert...ICU Electrolyte Replacement per Pharmacy   Other  pharmacy to dose Raj Canada M.D.       • senna-docusate (PERICOLACE or SENOKOT S) 8.6-50 MG per tablet 2 Tab  2 Tab Oral BID Jazmyn Marina M.D.   Stopped at 09/22/18 0600    And   • polyethylene glycol/lytes (MIRALAX) PACKET 1 Packet  1 Packet Oral QDAY PRN Jazmyn Marina M.D.        And   • magnesium hydroxide (MILK OF MAGNESIA) suspension 30 mL  30 mL Oral QDAY PRN Jazmyn Marina M.D.        And   • bisacodyl (DULCOLAX) suppository 10 mg  10 mg Rectal QDAY PRN Jazmyn Marina M.D.       • Respiratory Care per Protocol   Nebulization Continuous RT Jazmyn Marina M.D.       • labetalol (NORMODYNE,TRANDATE) injection 10 mg  10 mg Intravenous Q30 MIN PRN Raj Canada M.D.       • levothyroxine (SYNTHROID) tablet 112 mcg  112 mcg Oral AM ES Raj Canada M.D.   112 mcg at 09/23/18 0534   • thiamine tablet 100 mg  100 mg Oral DAILY Raj Canada M.D.   100 mg at 09/23/18 0534   • folic acid (FOLVITE) tablet 1 mg  1 mg Oral DAILY Raj Canada M.D.   1 mg at 09/23/18 0534   • LORazepam (ATIVAN) tablet 0.5 mg  0.5 mg Oral Q4HRS PRN Raj Canada M.D.       • LORazepam (ATIVAN) tablet 1 mg  1 mg Oral Q4HRS PRN Raj aCnada M.D.        Or   • LORazepam (ATIVAN) injection 0.5 mg  0.5 mg Intravenous Q4HRS PRN Raj Canada M.D.       • LORazepam (ATIVAN) tablet 2 mg  2 mg Oral Q2HRS PRN Raj Canada M.D.        Or   • LORazepam (ATIVAN) injection 1 mg  1 mg Intravenous Q2HRS PRN Raj Canada M.D.       • LORazepam (ATIVAN) tablet 3 mg  3 mg Oral Q HOUR PRN Raj Canada M.D.        Or   • LORazepam (ATIVAN) injection 1.5 mg  1.5 mg Intravenous Q HOUR PRN Raj Canada M.D.       • LORazepam (ATIVAN) tablet 4 mg  4 mg Oral Q15 MIN PRN Raj Canada M.D.        Or   • LORazepam (ATIVAN) injection 2 mg  2 mg Intravenous Q15 MIN PRN Raj Canada M.D.       • multivitamin (THERAGRAN) tablet 1 Tab  1 Tab Oral DAILY Jazmyn Marina M.D.   1  Tab at 09/23/18 0534   • cyanocobalamin (VITAMIN B-12) injection 1,000 mcg  1,000 mcg Intramuscular DAILY AT 1800 Raj Canada M.D.   1,000 mcg at 09/22/18 1743       Fluids    Intake/Output Summary (Last 24 hours) at 09/23/18 0702  Last data filed at 09/23/18 0600   Gross per 24 hour   Intake          3102.89 ml   Output             1750 ml   Net          1352.89 ml       Laboratory  Recent Results (from the past 48 hour(s))   CBC WITH DIFFERENTIAL    Collection Time: 09/21/18  6:16 PM   Result Value Ref Range    WBC 10.0 4.8 - 10.8 K/uL    RBC 1.57 (L) 4.20 - 5.40 M/uL    Hemoglobin 5.7 (LL) 12.0 - 16.0 g/dL    Hematocrit 18.4 (L) 37.0 - 47.0 %    .2 (H) 81.4 - 97.8 fL    MCH 36.3 (H) 27.0 - 33.0 pg    MCHC 31.0 (L) 33.6 - 35.0 g/dL    RDW 62.4 (H) 35.9 - 50.0 fL    Platelet Count 450 (H) 164 - 446 K/uL    MPV 9.6 9.0 - 12.9 fL    Neutrophils-Polys 79.20 (H) 44.00 - 72.00 %    Lymphocytes 9.50 (L) 22.00 - 41.00 %    Monocytes 10.00 0.00 - 13.40 %    Eosinophils 0.10 0.00 - 6.90 %    Basophils 0.50 0.00 - 1.80 %    Immature Granulocytes 0.70 0.00 - 0.90 %    Nucleated RBC 0.60 /100 WBC    Neutrophils (Absolute) 7.94 (H) 2.00 - 7.15 K/uL    Lymphs (Absolute) 0.95 (L) 1.00 - 4.80 K/uL    Monos (Absolute) 1.00 (H) 0.00 - 0.85 K/uL    Eos (Absolute) 0.01 0.00 - 0.51 K/uL    Baso (Absolute) 0.05 0.00 - 0.12 K/uL    Immature Granulocytes (abs) 0.07 0.00 - 0.11 K/uL    NRBC (Absolute) 0.06 K/uL   BASIC METABOLIC PANEL    Collection Time: 09/21/18  6:16 PM   Result Value Ref Range    Sodium 143 135 - 145 mmol/L    Potassium 4.2 3.6 - 5.5 mmol/L    Chloride 103 96 - 112 mmol/L    Co2 12 (L) 20 - 33 mmol/L    Glucose 139 (H) 65 - 99 mg/dL    Bun 67 (H) 8 - 22 mg/dL    Creatinine 1.45 (H) 0.50 - 1.40 mg/dL    Calcium 9.2 8.5 - 10.5 mg/dL    Anion Gap 28.0 (H) 0.0 - 11.9   TROPONIN    Collection Time: 09/21/18  6:16 PM   Result Value Ref Range    Troponin I 0.02 0.00 - 0.04 ng/mL   HEPATIC FUNCTION PANEL     Collection Time: 09/21/18  6:16 PM   Result Value Ref Range    Alkaline Phosphatase 72 30 - 99 U/L    AST(SGOT) 20 12 - 45 U/L    ALT(SGPT) 9 2 - 50 U/L    Total Bilirubin 0.7 0.1 - 1.5 mg/dL    Direct Bilirubin 0.3 0.1 - 0.5 mg/dL    Indirect Bilirubin 0.4 0.0 - 1.0 mg/dL    Albumin 3.7 3.2 - 4.9 g/dL    Total Protein 7.0 6.0 - 8.2 g/dL   DIAGNOSTIC ALCOHOL    Collection Time: 09/21/18  6:16 PM   Result Value Ref Range    Diagnostic Alcohol 0.00 0.00 g/dL   TSH    Collection Time: 09/21/18  6:16 PM   Result Value Ref Range    TSH 4.770 0.380 - 5.330 uIU/mL   PROTHROMBIN TIME (INR)    Collection Time: 09/21/18  6:16 PM   Result Value Ref Range    PT 14.7 (H) 12.0 - 14.6 sec    INR 1.14 (H) 0.87 - 1.13   APTT    Collection Time: 09/21/18  6:16 PM   Result Value Ref Range    APTT 23.1 (L) 24.7 - 36.0 sec   COD (ADULT)    Collection Time: 09/21/18  6:16 PM   Result Value Ref Range    ABO Grouping Only B     Rh Grouping Only POS     Antibody Screen-Cod NEG     Component R       R3                  Red Blood Cells3    [id number]   transfused   09/21/18   19:55      Product Type Red Blood Cells LR Pheresis     Dispense Status Transfused     Unit Number (Barcoded) [id number]     Product Code (Barcoded) U2206Q13     Blood Type (Barcoded) 1700     Component R       R3                  Red Blood Cells3    [id number]   transfused   09/22/18   01:02      Product Type Red Blood Cells LR Pheresis     Dispense Status Transfused     Unit Number (Barcoded) [id number]     Product Code (Barcoded) B8297C72     Blood Type (Barcoded) 1700    ESTIMATED GFR    Collection Time: 09/21/18  6:16 PM   Result Value Ref Range    GFR If  43 (A) >60 mL/min/1.73 m 2    GFR If Non  36 (A) >60 mL/min/1.73 m 2   MAGNESIUM    Collection Time: 09/21/18  6:16 PM   Result Value Ref Range    Magnesium 1.6 1.5 - 2.5 mg/dL   IRON/TOTAL IRON BIND    Collection Time: 09/21/18  6:16 PM   Result Value Ref Range    Iron  89 40 - 170 ug/dL    Total Iron Binding 396 250 - 450 ug/dL    % Saturation 22 15 - 55 %   EKG (Now)    Collection Time: 18  6:46 PM   Result Value Ref Range    Report       [facility]    Test Date:  2018  Pt Name:    ULYSSES PONCE          Department: ER  MRN:        [id number]                      Room:       [facility]  Gender:     Female                       Technician: [id number]  :        1950                   Requested By:ER TRIAGE PROTOCOL  Order #:    [id number]                    Reading MD:    Measurements  Intervals                                Axis  Rate:       122                          P:          64  NV:         120                          QRS:        63  QRSD:       116                          T:          -20  QT:         360  QTc:        513    Interpretive Statements  SINUS TACHYCARDIA  MULTIPLE ATRIAL PREMATURE COMPLEXES  RIGHT BUNDLE BRANCH BLOCK  Compared to ECG 2017 14:15:40  Atrial premature complex(es) now present  Right bundle-branch block now present  Incomplete right bundle-branch block no longer present  Prolonged QT interval no longer present     PLATELET MAPPING WITH BASIC TEG    Collection Time: 18  9:28 PM   Result Value Ref Range    Reaction Time Initial-R 4.1 (L) 5.0 - 10.0 min    Clot Kinetics-K 1.1 1.0 - 3.0 min    Clot Angle-Angle 73.9 (H) 53.0 - 72.0 degrees    Maximum Clot Strength-MA 69.5 50.0 - 70.0 mm    Lysis 30 minutes-LY30 11.9 (HH) 0.0 - 8.0 %    % Inhibition ADP 2.6 %    % Inhibition AA 2.8 %    TEG Algorithm Link Algorithm    VITAMIN B12    Collection Time: 18  9:28 PM   Result Value Ref Range    Vitamin B12 -True Cobalamin 412 211 - 911 pg/mL   FOLATE    Collection Time: 18  9:28 PM   Result Value Ref Range    Folate -Folic Acid 4.5 >4.0 ng/mL   LACTIC ACID    Collection Time: 18  9:32 PM   Result Value Ref Range    Lactic Acid 1.8 0.5 - 2.0 mmol/L   HEMOGLOBIN AND HEMATOCRIT     Collection Time: 18 12:11 AM   Result Value Ref Range    Hemoglobin 6.9 (L) 12.0 - 16.0 g/dL    Hematocrit 20.7 (L) 37.0 - 47.0 %   CBC WITHOUT DIFFERENTIAL    Collection Time: 18  5:31 AM   Result Value Ref Range    WBC 5.4 4.8 - 10.8 K/uL    RBC 2.33 (L) 4.20 - 5.40 M/uL    Hemoglobin 7.7 (L) 12.0 - 16.0 g/dL    Hematocrit 23.2 (L) 37.0 - 47.0 %    MCV 99.6 (H) 81.4 - 97.8 fL    MCH 33.0 27.0 - 33.0 pg    MCHC 33.2 (L) 33.6 - 35.0 g/dL    RDW 63.7 (H) 35.9 - 50.0 fL    Platelet Count 267 164 - 446 K/uL    MPV 9.5 9.0 - 12.9 fL   PHOSPHORUS    Collection Time: 18  5:31 AM   Result Value Ref Range    Phosphorus 4.4 2.5 - 4.5 mg/dL   Magnesium    Collection Time: 18  5:31 AM   Result Value Ref Range    Magnesium 2.1 1.5 - 2.5 mg/dL   Basic Metabolic Panel (BMP)    Collection Time: 18  5:31 AM   Result Value Ref Range    Sodium 145 135 - 145 mmol/L    Potassium 3.8 3.6 - 5.5 mmol/L    Chloride 110 96 - 112 mmol/L    Co2 14 (L) 20 - 33 mmol/L    Glucose 89 65 - 99 mg/dL    Bun 59 (H) 8 - 22 mg/dL    Creatinine 1.28 0.50 - 1.40 mg/dL    Calcium 7.8 (L) 8.5 - 10.5 mg/dL    Anion Gap 21.0 (H) 0.0 - 11.9   ESTIMATED GFR    Collection Time: 18  5:31 AM   Result Value Ref Range    GFR If African American 50 (A) >60 mL/min/1.73 m 2    GFR If Non  41 (A) >60 mL/min/1.73 m 2   EKG: Every morning x 3 days    Collection Time: 18  5:34 AM   Result Value Ref Range    Report       [facility]    Test Date:  2018  Pt Name:    ULYSSES PONCE          Department: 161  MRN:        [id number]                      Room:       [facility]  Gender:     Female                       Technician: HERMANN  :        1950                   Requested By:MOON CAMEJO  Order #:    [id number]                    Reading MD: David Diaz MD    Measurements  Intervals                                Axis  Rate:       72                           P:          60  WI:         140                           QRS:        64  QRSD:       128                          T:          46  QT:         452  QTc:        495    Interpretive Statements  SINUS RHYTHM  RIGHT BUNDLE BRANCH BLOCK  Compared to ECG 09/21/2018 18:46:50  Sinus tachycardia no longer present  Atrial premature complex(es) no longer present    Electronically Signed On 9- 5:44:20 PDT by David Diaz MD     URINE CREATININE RANDOM    Collection Time: 09/22/18  8:50 AM   Result Value Ref Range    Creatinine, Random Urine 103.10 mg/dL   URINE SODIUM RANDOM    Collection Time: 09/22/18  8:50 AM   Result Value Ref Range    Sodium, Urine -per volume 20 mmol/L   HEMOGLOBIN AND HEMATOCRIT    Collection Time: 09/22/18 12:00 PM   Result Value Ref Range    Hemoglobin 8.1 (L) 12.0 - 16.0 g/dL    Hematocrit 24.2 (L) 37.0 - 47.0 %   HEMOGLOBIN AND HEMATOCRIT    Collection Time: 09/22/18  5:52 PM   Result Value Ref Range    Hemoglobin 8.2 (L) 12.0 - 16.0 g/dL    Hematocrit 24.3 (L) 37.0 - 47.0 %   CBC WITHOUT DIFFERENTIAL    Collection Time: 09/23/18  4:30 AM   Result Value Ref Range    WBC 5.5 4.8 - 10.8 K/uL    RBC 2.23 (L) 4.20 - 5.40 M/uL    Hemoglobin 7.8 (L) 12.0 - 16.0 g/dL    Hematocrit 22.5 (L) 37.0 - 47.0 %    .9 (H) 81.4 - 97.8 fL    MCH 35.0 (H) 27.0 - 33.0 pg    MCHC 34.7 33.6 - 35.0 g/dL    RDW 76.6 (H) 35.9 - 50.0 fL    Platelet Count 213 164 - 446 K/uL    MPV 9.4 9.0 - 12.9 fL   PHOSPHORUS    Collection Time: 09/23/18  4:30 AM   Result Value Ref Range    Phosphorus 3.0 2.5 - 4.5 mg/dL   Magnesium    Collection Time: 09/23/18  4:30 AM   Result Value Ref Range    Magnesium 1.4 (L) 1.5 - 2.5 mg/dL   Basic Metabolic Panel (BMP)    Collection Time: 09/23/18  4:30 AM   Result Value Ref Range    Sodium 142 135 - 145 mmol/L    Potassium 3.4 (L) 3.6 - 5.5 mmol/L    Chloride 116 (H) 96 - 112 mmol/L    Co2 20 20 - 33 mmol/L    Glucose 98 65 - 99 mg/dL    Bun 36 (H) 8 - 22 mg/dL    Creatinine 1.06 0.50 - 1.40 mg/dL    Calcium 7.3 (L)  8.5 - 10.5 mg/dL    Anion Gap 6.0 0.0 - 11.9   ESTIMATED GFR    Collection Time: 18  4:30 AM   Result Value Ref Range    GFR If African American >60 >60 mL/min/1.73 m 2    GFR If Non  51 (A) >60 mL/min/1.73 m 2   EKG: Every morning x 3 days    Collection Time: 18  5:13 AM   Result Value Ref Range    Report       [facility]    Test Date:  2018  Pt Name:    ULYSSES PONCE          Department: 161  MRN:        [id number]                      Room:       27  Gender:     Female                       Technician: HERMANN  :        1950                   Requested By:MOON CAMEJO  Order #:    [id number]                    Reading MD:    Measurements  Intervals                                Axis  Rate:       77                           P:          61  ND:         144                          QRS:        67  QRSD:       106                          T:          39  QT:         436  QTc:        494    Interpretive Statements  SINUS RHYTHM  INCOMPLETE RIGHT BUNDLE BRANCH BLOCK  LOW VOLTAGE IN FRONTAL LEADS  BORDERLINE PROLONGED QT INTERVAL  Compared to ECG 2018 05:34:16  Incomplete right bundle-branch block now present  Low QRS voltage now present  Right bundle-branch block no longer present       Assessment/Plan      1.  Upper GI bleed  -Protonix gtt  -History of duodenal ulcer  -Transfuse PRBC to keep hemoglobin equal or greater 7 g/dL  -Series H&H  -GI is following  -EGD revealed duodenal ulcer and exposed embolization coils from IR and passed  -Started clears     2.  Macrocytic anemia  -Chronic blood loss and GI bleed  -Thiamine and folate  -B12 pending     3.  History of alcohol abuse  -Thiamine and folate  -CIWA protocol in place  -No signs of withdrawal     4.  History of hypertension, now hypotension  -Scheduled Coreg-she was given a single dose of this last night.  She became hypotensive.  This is because she was hypovolemic and anemic.   -No need for  antihypertensives in the setting.  - at bedside, I question  and patient regarding home medications and they both said she only takes thyroid.  Does not take any antihypertensives at home.  -norepinephrine now off  -She responded quite nicely to volume     5.  Hypothyroidism  -Continue with Synthroid     6.  Acute kidney injury  -Prerenal from hypovolemia  -IV fluids  -Monitoring daily renal function-improving daily as reflected by decreasing BUN and creatinine  -Required Piedra catheter due to inability to void and urinary retention     7.  ICU prophylaxis  -SCDs and Protonix    Patient appears hemodynamically stable with no evidence of new bleeding and is stable for downgrade to floor.  Transfer orders have been placed        Assessment and plan were discussed with the patient, her significant other, ICU medical resident.       VTE:  Contraindicated  Ulcer: PPI  Lines: Central Line  Ongoing indication addressed    I have performed a physical exam and reviewed and updated ROS and Plan today (9/23/2018). In review of yesterday's note (9/22/2018), there are no changes except as documented above.     Discussed patient condition and risk of morbidity and/or mortality with Pharmacy, , [facility] resident, Charge nurse / hot rounds and Patient

## 2018-09-23 NOTE — CARE PLAN
Problem: Safety  Goal: Will remain free from falls  Bed in lowest position. Non-skid socks in place. Personal possessions within reach. Mobility sign on door. Bed-alarm on. Call light within reach. Pt educated regarding fall prevention and states understanding. [place] fall assessment complete.     Problem: Skin Integrity  Goal: Risk for impaired skin integrity will decrease  Daily skin assessment complete. Continued 2 hour turns. Keep skin clean and dry.

## 2018-09-24 PROBLEM — N17.9 AKI (ACUTE KIDNEY INJURY) (HCC): Status: RESOLVED | Noted: 2018-09-21 | Resolved: 2018-09-24

## 2018-09-24 PROBLEM — R58 HYPOTENSION DUE TO BLOOD LOSS: Status: RESOLVED | Noted: 2017-03-04 | Resolved: 2018-09-24

## 2018-09-24 PROBLEM — I95.89 HYPOTENSION DUE TO BLOOD LOSS: Status: RESOLVED | Noted: 2017-03-04 | Resolved: 2018-09-24

## 2018-09-24 PROBLEM — R53.81 PHYSICAL DECONDITIONING: Status: ACTIVE | Noted: 2018-09-24

## 2018-09-24 LAB
ANION GAP SERPL CALC-SCNC: 9 MMOL/L (ref 0–11.9)
BASOPHILS # BLD AUTO: 0.5 % (ref 0–1.8)
BASOPHILS # BLD: 0.03 K/UL (ref 0–0.12)
BUN SERPL-MCNC: 22 MG/DL (ref 8–22)
CALCIUM SERPL-MCNC: 7.8 MG/DL (ref 8.5–10.5)
CHLORIDE SERPL-SCNC: 111 MMOL/L (ref 96–112)
CO2 SERPL-SCNC: 20 MMOL/L (ref 20–33)
CREAT SERPL-MCNC: 0.87 MG/DL (ref 0.5–1.4)
EKG IMPRESSION: NORMAL
EOSINOPHIL # BLD AUTO: 0.16 K/UL (ref 0–0.51)
EOSINOPHIL NFR BLD: 2.5 % (ref 0–6.9)
ERYTHROCYTE [DISTWIDTH] IN BLOOD BY AUTOMATED COUNT: 82.5 FL (ref 35.9–50)
GLUCOSE SERPL-MCNC: 108 MG/DL (ref 65–99)
HCT VFR BLD AUTO: 24.6 % (ref 37–47)
HCYS SERPL-SCNC: 12.56 UMOL/L
HGB BLD-MCNC: 8 G/DL (ref 12–16)
IMM GRANULOCYTES # BLD AUTO: 0.02 K/UL (ref 0–0.11)
IMM GRANULOCYTES NFR BLD AUTO: 0.3 % (ref 0–0.9)
LYMPHOCYTES # BLD AUTO: 1.25 K/UL (ref 1–4.8)
LYMPHOCYTES NFR BLD: 19.3 % (ref 22–41)
MAGNESIUM SERPL-MCNC: 2 MG/DL (ref 1.5–2.5)
MCH RBC QN AUTO: 33.5 PG (ref 27–33)
MCHC RBC AUTO-ENTMCNC: 32.5 G/DL (ref 33.6–35)
MCV RBC AUTO: 102.9 FL (ref 81.4–97.8)
MONOCYTES # BLD AUTO: 0.81 K/UL (ref 0–0.85)
MONOCYTES NFR BLD AUTO: 12.5 % (ref 0–13.4)
NEUTROPHILS # BLD AUTO: 4.19 K/UL (ref 2–7.15)
NEUTROPHILS NFR BLD: 64.9 % (ref 44–72)
NRBC # BLD AUTO: 0.03 K/UL
NRBC BLD-RTO: 0.5 /100 WBC
PHOSPHATE SERPL-MCNC: 2.3 MG/DL (ref 2.5–4.5)
PLATELET # BLD AUTO: 207 K/UL (ref 164–446)
PMV BLD AUTO: 9.5 FL (ref 9–12.9)
POTASSIUM SERPL-SCNC: 3.7 MMOL/L (ref 3.6–5.5)
RBC # BLD AUTO: 2.39 M/UL (ref 4.2–5.4)
SODIUM SERPL-SCNC: 140 MMOL/L (ref 135–145)
WBC # BLD AUTO: 6.5 K/UL (ref 4.8–10.8)

## 2018-09-24 PROCEDURE — 83090 ASSAY OF HOMOCYSTEINE: CPT

## 2018-09-24 PROCEDURE — 700102 HCHG RX REV CODE 250 W/ 637 OVERRIDE(OP): Performed by: INTERNAL MEDICINE

## 2018-09-24 PROCEDURE — 700105 HCHG RX REV CODE 258: Performed by: ANESTHESIOLOGY

## 2018-09-24 PROCEDURE — G8979 MOBILITY GOAL STATUS: HCPCS | Mod: CI

## 2018-09-24 PROCEDURE — G8978 MOBILITY CURRENT STATUS: HCPCS | Mod: CK

## 2018-09-24 PROCEDURE — 700101 HCHG RX REV CODE 250: Performed by: STUDENT IN AN ORGANIZED HEALTH CARE EDUCATION/TRAINING PROGRAM

## 2018-09-24 PROCEDURE — 84100 ASSAY OF PHOSPHORUS: CPT

## 2018-09-24 PROCEDURE — 700105 HCHG RX REV CODE 258: Performed by: STUDENT IN AN ORGANIZED HEALTH CARE EDUCATION/TRAINING PROGRAM

## 2018-09-24 PROCEDURE — A9270 NON-COVERED ITEM OR SERVICE: HCPCS | Performed by: INTERNAL MEDICINE

## 2018-09-24 PROCEDURE — A9270 NON-COVERED ITEM OR SERVICE: HCPCS | Performed by: STUDENT IN AN ORGANIZED HEALTH CARE EDUCATION/TRAINING PROGRAM

## 2018-09-24 PROCEDURE — 770001 HCHG ROOM/CARE - MED/SURG/GYN PRIV*

## 2018-09-24 PROCEDURE — 93005 ELECTROCARDIOGRAM TRACING: CPT | Performed by: INTERNAL MEDICINE

## 2018-09-24 PROCEDURE — 85025 COMPLETE CBC W/AUTO DIFF WBC: CPT

## 2018-09-24 PROCEDURE — 83735 ASSAY OF MAGNESIUM: CPT

## 2018-09-24 PROCEDURE — 99233 SBSQ HOSP IP/OBS HIGH 50: CPT | Performed by: INTERNAL MEDICINE

## 2018-09-24 PROCEDURE — 93010 ELECTROCARDIOGRAM REPORT: CPT | Performed by: INTERNAL MEDICINE

## 2018-09-24 PROCEDURE — 80048 BASIC METABOLIC PNL TOTAL CA: CPT

## 2018-09-24 PROCEDURE — 700102 HCHG RX REV CODE 250 W/ 637 OVERRIDE(OP): Performed by: STUDENT IN AN ORGANIZED HEALTH CARE EDUCATION/TRAINING PROGRAM

## 2018-09-24 PROCEDURE — 97162 PT EVAL MOD COMPLEX 30 MIN: CPT

## 2018-09-24 RX ADMIN — SUCRALFATE 1 G: 1 SUSPENSION ORAL at 18:04

## 2018-09-24 RX ADMIN — THERA TABS 1 TABLET: TAB at 05:27

## 2018-09-24 RX ADMIN — SUCRALFATE 1 G: 1 SUSPENSION ORAL at 20:16

## 2018-09-24 RX ADMIN — SUCRALFATE 1 G: 1 SUSPENSION ORAL at 05:28

## 2018-09-24 RX ADMIN — OMEPRAZOLE 20 MG: 20 CAPSULE, DELAYED RELEASE ORAL at 05:27

## 2018-09-24 RX ADMIN — OMEPRAZOLE 20 MG: 20 CAPSULE, DELAYED RELEASE ORAL at 18:04

## 2018-09-24 RX ADMIN — SUCRALFATE 1 G: 1 SUSPENSION ORAL at 12:27

## 2018-09-24 RX ADMIN — LEVOTHYROXINE SODIUM 112 MCG: 112 TABLET ORAL at 05:27

## 2018-09-24 RX ADMIN — POTASSIUM PHOSPHATE, MONOBASIC AND POTASSIUM PHOSPHATE, DIBASIC 30 MMOL: 224; 236 INJECTION, SOLUTION INTRAVENOUS at 10:23

## 2018-09-24 RX ADMIN — SODIUM CHLORIDE, SODIUM GLUCONATE, SODIUM ACETATE, POTASSIUM CHLORIDE AND MAGNESIUM CHLORIDE: 526; 502; 368; 37; 30 INJECTION, SOLUTION INTRAVENOUS at 08:40

## 2018-09-24 RX ADMIN — THIAMINE HCL TAB 100 MG 100 MG: 100 TAB at 05:27

## 2018-09-24 RX ADMIN — FOLIC ACID 1 MG: 1 TABLET ORAL at 05:28

## 2018-09-24 ASSESSMENT — COGNITIVE AND FUNCTIONAL STATUS - GENERAL
TURNING FROM BACK TO SIDE WHILE IN FLAT BAD: A LITTLE
STANDING UP FROM CHAIR USING ARMS: A LITTLE
SUGGESTED CMS G CODE MODIFIER MOBILITY: CK
CLIMB 3 TO 5 STEPS WITH RAILING: A LITTLE
WALKING IN HOSPITAL ROOM: A LITTLE
MOVING TO AND FROM BED TO CHAIR: A LOT
MOVING FROM LYING ON BACK TO SITTING ON SIDE OF FLAT BED: UNABLE
MOBILITY SCORE: 15

## 2018-09-24 ASSESSMENT — GAIT ASSESSMENTS
DEVIATION: DECREASED BASE OF SUPPORT;SHUFFLED GAIT;DECREASED HEEL STRIKE;DECREASED TOE OFF;OTHER (COMMENT)
ASSISTIVE DEVICE: FRONT WHEEL WALKER
GAIT LEVEL OF ASSIST: CONTACT GUARD ASSIST
DISTANCE (FEET): 16

## 2018-09-24 ASSESSMENT — PAIN SCALES - GENERAL
PAINLEVEL_OUTOF10: 0

## 2018-09-24 ASSESSMENT — ENCOUNTER SYMPTOMS
COUGH: 0
WEAKNESS: 1
SHORTNESS OF BREATH: 0
SENSORY CHANGE: 0
NAUSEA: 0
ABDOMINAL PAIN: 0
MEMORY LOSS: 1
FOCAL WEAKNESS: 0
HEADACHES: 0
DIZZINESS: 0

## 2018-09-24 NOTE — ASSESSMENT & PLAN NOTE
S/P EGD & Biopsy   Hb stabilized, received 2 u of PRBC earlier in the admission   Hemodynaminically stable   May transfer to telemetry bed

## 2018-09-24 NOTE — PROGRESS NOTES
Critical Care Progress Note    Date of admission  9/21/2018    Chief Complaint  68 y.o. female admitted 9/21/2018 with melena and generalized weakness over the last 2-3 months, her hemoglobin on admission was 5.7, duodenal ulcer was identified with a EGD. Pt received PRBC xfusion and IV fluids that resolved her hypotension     Hospital Course    ICU admission   PRBC xfusion   EGD identified doudenal ulcer with coils from previous intervention around 1 year ago       Interval Problem Update  Reviewed last 24 hour events:  No evidence overnight  Continues to be hemodynamically stable  Not requiring pressors  Adequate urine output, 900 mL since midnight  Requiring nasal cannula to maintain oxygenation above 90%    Review of Systems  Review of Systems   Unable to perform ROS: Dementia        Vital Signs for last 24 hours   Temp:  [36.8 °C (98.2 °F)-37.1 °C (98.8 °F)] 37.1 °C (98.8 °F)  Pulse:  [81-95] 81  Resp:  [16-24] 16    Hemodynamic parameters for last 24 hours       Vent Settings for last 24 hours       Physical Exam   Physical Exam   Constitutional: No distress.   HENT:   Head: Normocephalic and atraumatic.   Neck: No tracheal deviation present. No thyromegaly present.   Cardiovascular: Normal rate, regular rhythm, normal heart sounds and intact distal pulses.  Exam reveals no gallop and no friction rub.    No murmur heard.  Pulmonary/Chest: Effort normal and breath sounds normal. No respiratory distress. She has no wheezes. She has no rales. She exhibits no tenderness.   Abdominal: Soft. She exhibits no distension.   Neurological: She is alert.   Skin: Skin is warm and dry. No rash noted. She is not diaphoretic. No erythema. No pallor.   Nursing note and vitals reviewed.      Medications  Current Facility-Administered Medications   Medication Dose Route Frequency Provider Last Rate Last Dose   • potassium phosphates 30 mmol in D5W 500 mL ivpb  30 mmol Intravenous Once Ravindra Payne M.D. 83.3 mL/hr at  09/24/18 1023 30 mmol at 09/24/18 1023   • electrolyte-A (PLASMALYTE-A) infusion   Intravenous Continuous Owen Lawrence M.D.   Stopped at 09/24/18 1023   • omeprazole (PRILOSEC) capsule 20 mg  20 mg Oral BID David Hinojosa M.D.   20 mg at 09/24/18 0527   • sucralfate (CARAFATE) 1 GM/10ML suspension 1 g  1 g Oral 4X/DAY ACHS David Hinojosa M.D.   1 g at 09/24/18 1227   • acetaminophen (TYLENOL) tablet 650 mg  650 mg Oral Q6HRS PRN Owen Lawrence M.D.   650 mg at 09/23/18 1643   • lactated ringers infusion  250 mL Intravenous Once PRN Jazmyn Marina M.D.       • norepinephrine (LEVOPHED) 8 mg in  mL Infusion  0-30 mcg/min Intravenous Continuous Raj Canada M.D.   Stopped at 09/23/18 0800   • MD Alert...ICU Electrolyte Replacement per Pharmacy   Other pharmacy to dose Raj Canada M.D.       • senna-docusate (PERICOLACE or SENOKOT S) 8.6-50 MG per tablet 2 Tab  2 Tab Oral BID Jazmyn Marina M.D.   Stopped at 09/22/18 0600    And   • polyethylene glycol/lytes (MIRALAX) PACKET 1 Packet  1 Packet Oral QDAY PRN Jazmyn Marina M.D.        And   • magnesium hydroxide (MILK OF MAGNESIA) suspension 30 mL  30 mL Oral QDAY PRN Jazmyn Mairna M.D.        And   • bisacodyl (DULCOLAX) suppository 10 mg  10 mg Rectal QDAY PRN Jazmyn Marina M.D.       • Respiratory Care per Protocol   Nebulization Continuous RT Jazmyn Marina M.D.       • labetalol (NORMODYNE,TRANDATE) injection 10 mg  10 mg Intravenous Q30 MIN PRN Raj Canada M.D.       • levothyroxine (SYNTHROID) tablet 112 mcg  112 mcg Oral AM ES Raj Canada M.D.   112 mcg at 09/24/18 0527   • thiamine tablet 100 mg  100 mg Oral DAILY Raj Canada M.D.   100 mg at 09/24/18 0527   • folic acid (FOLVITE) tablet 1 mg  1 mg Oral DAILY Raj Canada M.D.   1 mg at 09/24/18 0528   • multivitamin (THERAGRAN) tablet 1 Tab  1 Tab Oral DAILY Jazmyn Marina M.D.   1 Tab at 09/24/18 0527       Fluids    Intake/Output  Summary (Last 24 hours) at 18 1430  Last data filed at 18 0843   Gross per 24 hour   Intake           3332.6 ml   Output             1075 ml   Net           2257.6 ml       Laboratory  Recent Results (from the past 48 hour(s))   HEMOGLOBIN AND HEMATOCRIT    Collection Time: 18  5:52 PM   Result Value Ref Range    Hemoglobin 8.2 (L) 12.0 - 16.0 g/dL    Hematocrit 24.3 (L) 37.0 - 47.0 %   CBC WITHOUT DIFFERENTIAL    Collection Time: 18  4:30 AM   Result Value Ref Range    WBC 5.5 4.8 - 10.8 K/uL    RBC 2.23 (L) 4.20 - 5.40 M/uL    Hemoglobin 7.8 (L) 12.0 - 16.0 g/dL    Hematocrit 22.5 (L) 37.0 - 47.0 %    .9 (H) 81.4 - 97.8 fL    MCH 35.0 (H) 27.0 - 33.0 pg    MCHC 34.7 33.6 - 35.0 g/dL    RDW 76.6 (H) 35.9 - 50.0 fL    Platelet Count 213 164 - 446 K/uL    MPV 9.4 9.0 - 12.9 fL   PHOSPHORUS    Collection Time: 18  4:30 AM   Result Value Ref Range    Phosphorus 3.0 2.5 - 4.5 mg/dL   Magnesium    Collection Time: 18  4:30 AM   Result Value Ref Range    Magnesium 1.4 (L) 1.5 - 2.5 mg/dL   Basic Metabolic Panel (BMP)    Collection Time: 18  4:30 AM   Result Value Ref Range    Sodium 142 135 - 145 mmol/L    Potassium 3.4 (L) 3.6 - 5.5 mmol/L    Chloride 116 (H) 96 - 112 mmol/L    Co2 20 20 - 33 mmol/L    Glucose 98 65 - 99 mg/dL    Bun 36 (H) 8 - 22 mg/dL    Creatinine 1.06 0.50 - 1.40 mg/dL    Calcium 7.3 (L) 8.5 - 10.5 mg/dL    Anion Gap 6.0 0.0 - 11.9   ESTIMATED GFR    Collection Time: 18  4:30 AM   Result Value Ref Range    GFR If African American >60 >60 mL/min/1.73 m 2    GFR If Non  51 (A) >60 mL/min/1.73 m 2   EKG: Every morning x 3 days    Collection Time: 18  5:13 AM   Result Value Ref Range    Report       [facility]    Test Date:  2018  Pt Name:    ULYSSES RKIS          Department: 161  MRN:        [id number]                      Room:       [facility]  Gender:     Female                       Technician: HERMANN  :         1950                   Requested By:MOON CAMEJO  Order #:    [id number]                    Reading MD: Owen Fairchild MD    Measurements  Intervals                                Axis  Rate:       77                           P:          61  ND:         144                          QRS:        67  QRSD:       106                          T:          39  QT:         436  QTc:        494    Interpretive Statements  SINUS RHYTHM  RIGHT BUNDLE BRANCH BLOCK  LOW VOLTAGE IN FRONTAL LEADS  Compared to ECG 09/22/2018 05:34:16  No significant change.    Electronically Signed On 9- 14:46:53 PDT by Owen Fairchild MD     CBC WITH DIFFERENTIAL    Collection Time: 09/23/18  1:30 PM   Result Value Ref Range    WBC 6.1 4.8 - 10.8 K/uL    RBC 2.39 (L) 4.20 - 5.40 M/uL    Hemoglobin 8.0 (L) 12.0 - 16.0 g/dL    Hematocrit 24.8 (L) 37.0 - 47.0 %    .8 (H) 81.4 - 97.8 fL    MCH 33.5 (H) 27.0 - 33.0 pg    MCHC 32.3 (L) 33.6 - 35.0 g/dL    RDW 83.2 (H) 35.9 - 50.0 fL    Platelet Count 222 164 - 446 K/uL    MPV 9.6 9.0 - 12.9 fL    Neutrophils-Polys 64.70 44.00 - 72.00 %    Lymphocytes 19.50 (L) 22.00 - 41.00 %    Monocytes 12.40 0.00 - 13.40 %    Eosinophils 2.30 0.00 - 6.90 %    Basophils 0.30 0.00 - 1.80 %    Immature Granulocytes 0.80 0.00 - 0.90 %    Nucleated RBC 0.70 /100 WBC    Lymphs (Absolute) 1.20 1.00 - 4.80 K/uL    Monos (Absolute) 0.76 0.00 - 0.85 K/uL    Eos (Absolute) 0.14 0.00 - 0.51 K/uL    Baso (Absolute) 0.02 0.00 - 0.12 K/uL    Immature Granulocytes (abs) 0.05 0.00 - 0.11 K/uL    NRBC (Absolute) 0.04 K/uL    Neutrophils (Absolute) 3.97 2.00 - 7.15 K/uL    Anisocytosis 1+     Macrocytosis 1+     Microcytosis 1+    PERIPHERAL SMEAR REVIEW    Collection Time: 09/23/18  1:30 PM   Result Value Ref Range    Peripheral Smear Review see below    PLATELET ESTIMATE    Collection Time: 09/23/18  1:30 PM   Result Value Ref Range    Plt Estimation Normal    MORPHOLOGY    Collection Time: 09/23/18  1:30 PM    Result Value Ref Range    RBC Morphology Present     Polychromia 1+    DIFFERENTIAL COMMENT    Collection Time: 18  1:30 PM   Result Value Ref Range    Comments-Diff see below    EKG: Every morning x 3 days    Collection Time: 18  5:04 AM   Result Value Ref Range    Report       [facility]    Test Date:  2018  Pt Name:    ULYSSES PONCE          Department: 161  MRN:        [id number]                      Room:       [facility]  Gender:     Female                       Technician: HERMANN  :        1950                   Requested By:MOON CAMEJO  Order #:    [id number]                    Reading MD: Sarahi Turcios MD    Measurements  Intervals                                Axis  Rate:       95                           P:          66  MT:         136                          QRS:        60  QRSD:       106                          T:          40  QT:         384  QTc:        483    Interpretive Statements  SINUS RHYTHM  INCOMPLETE RIGHT BUNDLE BRANCH BLOCK  LOW VOLTAGE IN FRONTAL LEADS  Compared to ECG 2018 05:13:30  No significant change noted  Electronically Signed On 2018 6:28:43 PDT by Sarahi Turcios MD     BASIC METABOLIC PANEL    Collection Time: 18  5:15 AM   Result Value Ref Range    Sodium 140 135 - 145 mmol/L    Potassium 3.7 3.6 - 5.5 mmol/L    Chloride 111 96 - 112 mmol/L    Co2 20 20 - 33 mmol/L    Glucose 108 (H) 65 - 99 mg/dL    Bun 22 8 - 22 mg/dL    Creatinine 0.87 0.50 - 1.40 mg/dL    Calcium 7.8 (L) 8.5 - 10.5 mg/dL    Anion Gap 9.0 0.0 - 11.9   CBC WITH DIFFERENTIAL    Collection Time: 18  5:15 AM   Result Value Ref Range    WBC 6.5 4.8 - 10.8 K/uL    RBC 2.39 (L) 4.20 - 5.40 M/uL    Hemoglobin 8.0 (L) 12.0 - 16.0 g/dL    Hematocrit 24.6 (L) 37.0 - 47.0 %    .9 (H) 81.4 - 97.8 fL    MCH 33.5 (H) 27.0 - 33.0 pg    MCHC 32.5 (L) 33.6 - 35.0 g/dL    RDW 82.5 (H) 35.9 - 50.0 fL    Platelet Count 207 164 - 446 K/uL    MPV 9.5 9.0 - 12.9 fL     Neutrophils-Polys 64.90 44.00 - 72.00 %    Lymphocytes 19.30 (L) 22.00 - 41.00 %    Monocytes 12.50 0.00 - 13.40 %    Eosinophils 2.50 0.00 - 6.90 %    Basophils 0.50 0.00 - 1.80 %    Immature Granulocytes 0.30 0.00 - 0.90 %    Nucleated RBC 0.50 /100 WBC    Neutrophils (Absolute) 4.19 2.00 - 7.15 K/uL    Lymphs (Absolute) 1.25 1.00 - 4.80 K/uL    Monos (Absolute) 0.81 0.00 - 0.85 K/uL    Eos (Absolute) 0.16 0.00 - 0.51 K/uL    Baso (Absolute) 0.03 0.00 - 0.12 K/uL    Immature Granulocytes (abs) 0.02 0.00 - 0.11 K/uL    NRBC (Absolute) 0.03 K/uL   MAGNESIUM    Collection Time: 09/24/18  5:15 AM   Result Value Ref Range    Magnesium 2.0 1.5 - 2.5 mg/dL   PHOSPHORUS    Collection Time: 09/24/18  5:15 AM   Result Value Ref Range    Phosphorus 2.3 (L) 2.5 - 4.5 mg/dL   HOMOCYSTEINE    Collection Time: 09/24/18  5:15 AM   Result Value Ref Range    Homocysteine 12.56 (H) <11.00 umol/L   ESTIMATED GFR    Collection Time: 09/24/18  5:15 AM   Result Value Ref Range    GFR If African American >60 >60 mL/min/1.73 m 2    GFR If Non African American >60 >60 mL/min/1.73 m 2       Imaging  X-Ray:  I have personally reviewed the images and compared with prior images.    Assessment/Plan  * Upper GI bleeding- (present on admission)   Assessment & Plan    S/P EGD  Hb stabilized   Hemodynaminically stable         STEFAN (acute kidney injury) (HCC)   Assessment & Plan    Cr normalized yesterday         Hypotension due to blood loss- (present on admission)   Assessment & Plan    Resolved with RBC xfusion and IV fluids              VTE:  Not Indicated  Ulcer: H2 Antagonist  Lines: Piedra Catheter  will d/c today     I have performed a physical exam and reviewed and updated ROS and Plan today (9/24/2018). In review of yesterday's note (9/23/2018), there are no changes except as documented above.     Discussed patient condition and risk of morbidity and/or mortality with Hospitalist, RN, RT and Pharmacy    Transfer to telemetry   D/C IVF  D/C  Piedra's cath  Incentive spirometry

## 2018-09-24 NOTE — THERAPY
"Physical Therapy Evaluation completed.   Bed Mobility:   Sit to supine with SBA though VC and notably increased effort; see below  Transfers: Sit to Stand: Minimal Assist (min A from lower surface for initiation; CGA from EOB)  Gait: Level Of Assist: Contact Guard Assist with Front-Wheel Walker for short distance ambulation; see below      Plan of Care: Will benefit from Physical Therapy 3 times per week  Discharge Recommendations: Equipment: Will Continue to Assess for Equipment Needs. See below    Pt presents to PT with impaired balance, gait, endurance and general locmotion associated with recent deconditioning and medical co-morbidities. She is able to demonstrate bed mobility with increased effort though no physical assist, sit<>stands with min A from chair surface, and short distance ambulation with FWW with CGA. She is below recent baseline function and would currently recommend continued skilled PT/placement prior to medical dc to home given current objective findings, age, I PLOF with household mobility, environmental barriers, current co-morbidities, and limited social supports (as pt reports SO works and she is alone during day). Will continue to visit.     See \"Rehab Therapy-Acute\" Patient Summary Report for complete documentation.     "
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
## 2018-09-24 NOTE — CARE PLAN
Problem: Safety  Goal: Will remain free from falls  Outcome: PROGRESSING AS EXPECTED    Intervention: Implement fall precautions  Bed alarm on. Bed in lowest, locked position. Pt wearing treaded slipper socks. Call light is within reach.       Problem: Mobility  Goal: Risk for activity intolerance will decrease  Outcome: PROGRESSING AS EXPECTED    Intervention: Assess and monitor signs of activity intolerance  Pt sat at EOB for 5 min, 1 person assist.

## 2018-09-25 LAB
ANION GAP SERPL CALC-SCNC: 8 MMOL/L (ref 0–11.9)
BASOPHILS # BLD AUTO: 0.6 % (ref 0–1.8)
BASOPHILS # BLD: 0.04 K/UL (ref 0–0.12)
BUN SERPL-MCNC: 17 MG/DL (ref 8–22)
CALCIUM SERPL-MCNC: 8.1 MG/DL (ref 8.5–10.5)
CHLORIDE SERPL-SCNC: 110 MMOL/L (ref 96–112)
CO2 SERPL-SCNC: 22 MMOL/L (ref 20–33)
CREAT SERPL-MCNC: 0.89 MG/DL (ref 0.5–1.4)
EOSINOPHIL # BLD AUTO: 0.15 K/UL (ref 0–0.51)
EOSINOPHIL NFR BLD: 2.3 % (ref 0–6.9)
ERYTHROCYTE [DISTWIDTH] IN BLOOD BY AUTOMATED COUNT: 81.6 FL (ref 35.9–50)
GLUCOSE SERPL-MCNC: 99 MG/DL (ref 65–99)
HCT VFR BLD AUTO: 25.3 % (ref 37–47)
HGB BLD-MCNC: 8.1 G/DL (ref 12–16)
IMM GRANULOCYTES # BLD AUTO: 0.01 K/UL (ref 0–0.11)
IMM GRANULOCYTES NFR BLD AUTO: 0.2 % (ref 0–0.9)
LYMPHOCYTES # BLD AUTO: 1.85 K/UL (ref 1–4.8)
LYMPHOCYTES NFR BLD: 28.6 % (ref 22–41)
MAGNESIUM SERPL-MCNC: 1.4 MG/DL (ref 1.5–2.5)
MCH RBC QN AUTO: 32.9 PG (ref 27–33)
MCHC RBC AUTO-ENTMCNC: 32 G/DL (ref 33.6–35)
MCV RBC AUTO: 102.8 FL (ref 81.4–97.8)
MONOCYTES # BLD AUTO: 0.81 K/UL (ref 0–0.85)
MONOCYTES NFR BLD AUTO: 12.5 % (ref 0–13.4)
NEUTROPHILS # BLD AUTO: 3.61 K/UL (ref 2–7.15)
NEUTROPHILS NFR BLD: 55.8 % (ref 44–72)
NRBC # BLD AUTO: 0 K/UL
NRBC BLD-RTO: 0 /100 WBC
PHOSPHATE SERPL-MCNC: 3.5 MG/DL (ref 2.5–4.5)
PLATELET # BLD AUTO: 206 K/UL (ref 164–446)
PMV BLD AUTO: 9.6 FL (ref 9–12.9)
POTASSIUM SERPL-SCNC: 4.5 MMOL/L (ref 3.6–5.5)
RBC # BLD AUTO: 2.46 M/UL (ref 4.2–5.4)
SODIUM SERPL-SCNC: 140 MMOL/L (ref 135–145)
WBC # BLD AUTO: 6.5 K/UL (ref 4.8–10.8)

## 2018-09-25 PROCEDURE — G8988 SELF CARE GOAL STATUS: HCPCS | Mod: CI

## 2018-09-25 PROCEDURE — G8987 SELF CARE CURRENT STATUS: HCPCS | Mod: CJ

## 2018-09-25 PROCEDURE — 700102 HCHG RX REV CODE 250 W/ 637 OVERRIDE(OP): Performed by: INTERNAL MEDICINE

## 2018-09-25 PROCEDURE — A9270 NON-COVERED ITEM OR SERVICE: HCPCS | Performed by: STUDENT IN AN ORGANIZED HEALTH CARE EDUCATION/TRAINING PROGRAM

## 2018-09-25 PROCEDURE — 83735 ASSAY OF MAGNESIUM: CPT

## 2018-09-25 PROCEDURE — 84100 ASSAY OF PHOSPHORUS: CPT

## 2018-09-25 PROCEDURE — 80048 BASIC METABOLIC PNL TOTAL CA: CPT

## 2018-09-25 PROCEDURE — 97530 THERAPEUTIC ACTIVITIES: CPT

## 2018-09-25 PROCEDURE — 99232 SBSQ HOSP IP/OBS MODERATE 35: CPT | Performed by: INTERNAL MEDICINE

## 2018-09-25 PROCEDURE — 700102 HCHG RX REV CODE 250 W/ 637 OVERRIDE(OP): Performed by: STUDENT IN AN ORGANIZED HEALTH CARE EDUCATION/TRAINING PROGRAM

## 2018-09-25 PROCEDURE — A9270 NON-COVERED ITEM OR SERVICE: HCPCS | Performed by: INTERNAL MEDICINE

## 2018-09-25 PROCEDURE — 700111 HCHG RX REV CODE 636 W/ 250 OVERRIDE (IP): Performed by: STUDENT IN AN ORGANIZED HEALTH CARE EDUCATION/TRAINING PROGRAM

## 2018-09-25 PROCEDURE — 97165 OT EVAL LOW COMPLEX 30 MIN: CPT

## 2018-09-25 PROCEDURE — 85025 COMPLETE CBC W/AUTO DIFF WBC: CPT

## 2018-09-25 PROCEDURE — 770001 HCHG ROOM/CARE - MED/SURG/GYN PRIV*

## 2018-09-25 RX ORDER — AMLODIPINE BESYLATE 2.5 MG/1
2.5 TABLET ORAL
Status: DISCONTINUED | OUTPATIENT
Start: 2018-09-25 | End: 2018-09-26

## 2018-09-25 RX ORDER — MAGNESIUM SULFATE HEPTAHYDRATE 40 MG/ML
4 INJECTION, SOLUTION INTRAVENOUS ONCE
Status: COMPLETED | OUTPATIENT
Start: 2018-09-25 | End: 2018-09-25

## 2018-09-25 RX ADMIN — THIAMINE HCL TAB 100 MG 100 MG: 100 TAB at 05:52

## 2018-09-25 RX ADMIN — THERA TABS 1 TABLET: TAB at 05:52

## 2018-09-25 RX ADMIN — SUCRALFATE 1 G: 1 SUSPENSION ORAL at 21:26

## 2018-09-25 RX ADMIN — SUCRALFATE 1 G: 1 SUSPENSION ORAL at 12:30

## 2018-09-25 RX ADMIN — OMEPRAZOLE 20 MG: 20 CAPSULE, DELAYED RELEASE ORAL at 05:52

## 2018-09-25 RX ADMIN — LEVOTHYROXINE SODIUM 112 MCG: 112 TABLET ORAL at 05:52

## 2018-09-25 RX ADMIN — AMLODIPINE BESYLATE 2.5 MG: 2.5 TABLET ORAL at 17:49

## 2018-09-25 RX ADMIN — OMEPRAZOLE 20 MG: 20 CAPSULE, DELAYED RELEASE ORAL at 17:49

## 2018-09-25 RX ADMIN — MAGNESIUM SULFATE IN WATER 4 G: 40 INJECTION, SOLUTION INTRAVENOUS at 08:41

## 2018-09-25 RX ADMIN — SUCRALFATE 1 G: 1 SUSPENSION ORAL at 05:54

## 2018-09-25 RX ADMIN — FOLIC ACID 1 MG: 1 TABLET ORAL at 05:52

## 2018-09-25 RX ADMIN — SUCRALFATE 1 G: 1 SUSPENSION ORAL at 17:49

## 2018-09-25 ASSESSMENT — ENCOUNTER SYMPTOMS
ABDOMINAL DISTENTION: 0
FOCAL WEAKNESS: 0
ABDOMINAL PAIN: 0
CHEST TIGHTNESS: 0
SHORTNESS OF BREATH: 0
VOMITING: 0
SENSORY CHANGE: 0
ACTIVITY CHANGE: 0
WEAKNESS: 1
CHOKING: 0
DIARRHEA: 0
APPETITE CHANGE: 0
NAUSEA: 0
DIZZINESS: 0
COUGH: 0
HEADACHES: 0
MEMORY LOSS: 1

## 2018-09-25 ASSESSMENT — GAIT ASSESSMENTS
GAIT LEVEL OF ASSIST: CONTACT GUARD ASSIST
DISTANCE (FEET): 75
ASSISTIVE DEVICE: FRONT WHEEL WALKER
DEVIATION: OTHER (COMMENT);DECREASED BASE OF SUPPORT;DECREASED HEEL STRIKE;DECREASED TOE OFF

## 2018-09-25 ASSESSMENT — COGNITIVE AND FUNCTIONAL STATUS - GENERAL
TURNING FROM BACK TO SIDE WHILE IN FLAT BAD: A LITTLE
DAILY ACTIVITIY SCORE: 19
STANDING UP FROM CHAIR USING ARMS: A LITTLE
DRESSING REGULAR UPPER BODY CLOTHING: A LITTLE
PERSONAL GROOMING: A LITTLE
MOVING TO AND FROM BED TO CHAIR: A LOT
MOBILITY SCORE: 16
SUGGESTED CMS G CODE MODIFIER MOBILITY: CK
CLIMB 3 TO 5 STEPS WITH RAILING: A LITTLE
TOILETING: A LITTLE
SUGGESTED CMS G CODE MODIFIER DAILY ACTIVITY: CK
HELP NEEDED FOR BATHING: A LITTLE
MOVING FROM LYING ON BACK TO SITTING ON SIDE OF FLAT BED: A LOT
DRESSING REGULAR LOWER BODY CLOTHING: A LITTLE
WALKING IN HOSPITAL ROOM: A LITTLE

## 2018-09-25 ASSESSMENT — PAIN SCALES - GENERAL
PAINLEVEL_OUTOF10: 0

## 2018-09-25 ASSESSMENT — ACTIVITIES OF DAILY LIVING (ADL): TOILETING: INDEPENDENT

## 2018-09-25 NOTE — DISCHARGE PLANNING
Agency/Facility Name: [facility]  Spoke To: Andree  Outcome: PT accepted. [facility] requesting updated Pt note to run insurance auth. [employer] (LSW) informed that Pt will need to see PT.

## 2018-09-25 NOTE — ED NOTES
Med Rec complete per Pt at bedside  Allergies Reviewed  No ABX in the last 30 days  Ok per Pt to discuss medications with visitor/s present

## 2018-09-25 NOTE — CARE PLAN
Problem: Discharge Barriers/Planning  Goal: Patient's continuum of care needs will be met  Outcome: PROGRESSING AS EXPECTED    Intervention: Collaborate with Transitional Care Team and Interdisciplinary Team to meet discharge needs  PT/OT recommend SNF placement. SNF placement to be discussed in interdisciplinary rounds.       Problem: Mobility  Goal: Risk for activity intolerance will decrease  Outcome: PROGRESSING AS EXPECTED    Intervention: Assess and monitor signs of activity intolerance  Pt mobilizing to EOB, chair and bathroom w/ one person assist and FWW.   Intervention: Encourage patient to increase activity level in collaboration with Interdisciplinary Team  PT/OT following.
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
## 2018-09-25 NOTE — THERAPY
"Physical Therapy Treatment completed.   Bed Mobility:  Supine to Sit: Modified Independent  Transfers: Sit to Stand: Contact Guard Assist  Gait: Level Of Assist: Contact Guard Assist with Front-Wheel Walker       Plan of Care: Will benefit from Physical Therapy 3 times per week  Discharge Recommendations: Equipment: Will Continue to Assess for Equipment Needs. See below    Pt progressing as expected given co-morbidities. She is able to demonstrate short distance ambulation with FWW with CGA. As prior she is able to demonstrate bed mobility with increased effort though no physical assist. She is again below recent baseline function and would currently recommend continued skilled PT/placement prior to medical dc to home given current objective findings, age, I PLOF with household mobility, environmental barriers, current co-morbidities, and limited social supports (as pt reports SO works and she is alone during day). She is currently at increased risk for falls without additional skilled PT intervention. Will continue to visit. She remains pleasant, cooperative and motivated for return to PLOF.      See \"Rehab Therapy-Acute\" Patient Summary Report for complete documentation.       "

## 2018-09-25 NOTE — CARE PLAN
Problem: Nutritional:  Goal: Achieve adequate nutritional intake  Pt tolerating diet >Clears with intake ~50*% of meals and supplements.   Outcome: MET Date Met: 09/25/18

## 2018-09-25 NOTE — CARE PLAN
Problem: Safety  Goal: Will remain free from falls  Outcome: PROGRESSING AS EXPECTED  High fall risk precautions in place. Pt encouraged to use call light before getting out of bed. Pt verbalized understanding and able to return demonstrate how to use call light. Pt intermittently confused and forgets to call. Bed alarm on.     Problem: Infection  Goal: Will remain free from infection  Outcome: PROGRESSING AS EXPECTED  Standard precautions in place with every patient interaction. Frequent handwashing and foaming utilized to prevent spread of infection to patient or staff members.

## 2018-09-25 NOTE — DISCHARGE PLANNING
Anticipated Discharge Disposition: SNF    Action: LSW notified gustabo RN and MD that PT will need to see pt again. After PT see's Pt again then [facility] will be able to submit for insurance auth.    Barriers to Discharge: None    Plan: LSW to monitor for completed PT note to submit to [facility] for insurance auth.

## 2018-09-25 NOTE — PROGRESS NOTES
"[facility] ICU Progress Note      Admit Date: 9/21/2018    Resident: Dunia Vega, PGY-2  Attending: VARUN CENTENO/ Dr. Owen Lawrence/Dr. Gonda/Dr. Shea    Date & Time:   9/25/2018   6:12 AM       Patient ID:    Name:             Shannon Balbuena   YOB: 1950  Age:                 68 y.o.  female   MRN:               [id number]    HPI:  Per Dr. Marina's HPI 9/21:   \"Patient is a 68-year-old lady who was brought in due to a ground-level fall that happened earlier today after receiving a shock from an outlet in her home.  She denies any loss of consciousness.  Patient states she has associated generalized malaise/fatigue, SOB, weakness and decreased appetite.  She also hesitantly stated that she has been having dark stool and coffee-ground emesis for the past 3 months.  She denies taking any NSAIDs, but does endorse drinking 2 shots of bourbon every night - about 4 ounces each.  She has a history of a significant upper GI bleed with duodenal ulcer clipping in 2017.  Patient has not had follow-up with GI since then.\"    Interval Events:  -per patient and RN, had one episode of melena last night (9/24) amid other normal bowel movements  -remains hemodynamically stable with VSS without pressors; last two BPs hypertensive.   -Hgb remains stable around 8 x6 checks  -IVF & CIWA stopped yesterday  -prerenal STEFAN resolved  -advanced to regular diet yesterday  -PT recommended SNF for continued PT 3x/week as well as FWW; referral placed to SNF and currently awaiting placement    Review of Systems   Constitutional: Positive for malaise/fatigue.        Unsure whether weight loss. Does not measure and states she wears baggy clothes so would not notice a difference in how they fit   Respiratory: Negative for cough and shortness of breath.    Cardiovascular: Negative for chest pain.   Gastrointestinal: Positive for melena. Negative for abdominal pain and nausea.   Genitourinary:        Note: there was urinary " retention early in hospitalization, but patient was not aware   Musculoskeletal: Positive for joint pain.   Neurological: Positive for weakness. Negative for dizziness, sensory change, focal weakness and headaches.   Psychiatric/Behavioral: Positive for memory loss.        Alcohol abuse.        Physical Exam   Constitutional: No distress.   No acute distress. Mildly malodorous. Very pleasant.   HENT:   Head: Normocephalic and atraumatic.   Eyes: Conjunctivae and EOM are normal. No scleral icterus.   Mildly pale conjunctivae.    Neck: Neck supple.   Cardiovascular: Normal rate and regular rhythm.    No murmur heard.  Pulmonary/Chest: Effort normal. She has no wheezes. She has no rales.   Abdominal: Soft. Bowel sounds are normal. She exhibits no distension and no mass. There is no tenderness. There is no guarding.   Non-distended.   Musculoskeletal: She exhibits no edema.   Neurological: She exhibits normal muscle tone.   Alert and oriented to person and situation but not to place (thinks she is in hospice) or time (stated it is June, could not state year).    Skin: Skin is warm and dry. There is pallor.   Psychiatric: Her behavior is normal. Thought content normal.   Flat affect     Consultants:  GI:  Dr. Maharaj/Dr. Hinojosa  PMA: Dr. Lawrence, Dr. Shea     Procedures:  -central line placement, 9/21  -EGD 9/22    Imaging:  US-ABDOMEN COMPLETE SURVEY   Final Result      1.  Probable fatty infiltration of liver with small LEFT lobe cysts.   2.  Trace ascites.   3.  Cholelithiasis.   4.  No evidence for acute cholecystitis or biliary obstruction.      DX-CHEST-PORTABLE (1 VIEW)   Final Result         Left central venous catheter with tip in the mid SVC.      DX-CHEST-PORTABLE (1 VIEW)   Final Result         1. No acute cardiopulmonary abnormalities are identified.      DX-CHEST-PORTABLE (1 VIEW)   Final Result         1. No acute cardiopulmonary abnormalities are identified.      CT-HEAD W/O   Final Result      1.  Cerebral atrophy.   2. White matter lucencies most consistent with small vessel ischemic change versus demyelination or gliosis.   3. Otherwise, Head CT without contrast with no acute findings. No evidence of acute cerebral infarction, hemorrhage or mass lesion.        Respiratory:  Respiration: 16, Pulse Oximetry: 99 %    HemoDynamics:  Pulse: (!) 59, Heart Rate (Monitored): 90 NIBP: 149/91      Fluids:    Intake/Output Summary (Last 24 hours) at 18  Last data filed at 18   Gross per 24 hour   Intake              700 ml   Output              325 ml   Net              375 ml     Weight: 66.8 kg (147 lb 4.3 oz)  Body mass index is 24.51 kg/m².    Recent Labs      18   0430  18   0515  09/25/18   0400   SODIUM  142  140  140   POTASSIUM  3.4*  3.7  4.5   CHLORIDE  116*  111  110   CO2  20  20  22   BUN  36*  22  17   CREATININE  1.06  0.87  0.89   MAGNESIUM  1.4*  2.0  1.4*   PHOSPHORUS  3.0  2.3*  3.5   CALCIUM  7.3*  7.8*  8.1*       GI/Nutrition:  Recent Labs      18   0430  18   0515  18   0400   GLUCOSE  98  108*  99       Heme:  Recent Labs      18   1330  18   0515  18   0400   RBC  2.39*  2.39*  2.46*   HEMOGLOBIN  8.0*  8.0*  8.1*   HEMATOCRIT  24.8*  24.6*  25.3*   PLATELETCT  222  207  206       Infectious Disease:  Temp  Av.1 °C (98.7 °F)  Min: 36.7 °C (98 °F)  Max: 37.8 °C (100.1 °F)  Recent Labs      18   1330  18   0515  18   0400   WBC  6.1  6.5  6.5   NEUTSPOLYS  64.70  64.90  55.80   LYMPHOCYTES  19.50*  19.30*  28.60   MONOCYTES  12.40  12.50  12.50   EOSINOPHILS  2.30  2.50  2.30   BASOPHILS  0.30  0.50  0.60       Meds:  • omeprazole  20 mg     • sucralfate  1 g     • acetaminophen  650 mg     • LR  250 mL     • NORepinephrine (LEVOPHED) infusion  0-30 mcg/min Stopped (18 0800)   • MD Alert...Adult ICU Electrolyte Replacement per Pharmacy       • senna-docusate  2 Tab      And   • polyethylene  glycol/lytes  1 Packet      And   • magnesium hydroxide  30 mL      And   • bisacodyl  10 mg     • Respiratory Care per Protocol       • labetalol  10 mg     • levothyroxine  112 mcg     • thiamine  100 mg     • folic acid  1 mg     • multivitamin  1 Tab            Assessment and Plan:  * Upper GI bleeding- (present on admission)   Assessment & Plan    Melena and hematemesis for 3 months  No NSAIDs, but has been drinking [place] daily  Hb 5.7 on initial admit, received 2 units PRBCs   Hb stable   Now off pressors   EGD: clean-based chronic appearing duodenal ulcer, took bx    Plan:  PRBC when Hb <7  H&H QD  Long term high dose PPI per GI; will also continue sucralfate at discharge  SCDs, no NSAIDs or pharmx DVT prophx  Diet advanced yesterday to regular  At discharge will need f/u appt with Dr. Maharaj both re: UGI bleed and for chronic HCV        Physical deconditioning   Assessment & Plan    Has generalized weakness  Muscle atrophy  Per PT assessment needs FWW, SNF. Referral placed and pending.         Prolonged Q-T interval on ECG   Assessment & Plan    QTc 513>> now 483  Serial EKGs  Avoid QTc prolonging meds when possible        Hypokalemia   Assessment & Plan    Replete as needed        Hypomagnesemia   Assessment & Plan    Replete as needed        Macrocytic anemia   Assessment & Plan    Folate and B12 wnl, folate low normal with high homocysteine   On folate        Alcohol use   Assessment & Plan    Drinks 2, 4-5oz bourban shots a night for many years  DC'd CIWA   Thiamine/Folate/MV  -B12 and folate wnl, though folate was low normal  -High homocysteine - on folate        Chronic hepatitis C virus infection (HCC)   Assessment & Plan    Untreated  Visceral angiogram in 2017 shows signs of portal htn  -US abd showed scant ascites so no abx prophylaxis started  Will need f/u with Dr. Maharaj (GI) as outpt for treatment        Chronic hypertension- (present on admission)   Assessment & Plan    Hold BP meds in  setting of GI bleed   Given last two BPs hypertensive and pressors/IVF held, may start an anti-hypertensive  Pharmacy med-rec today shows she was not taking any anti-hypertensives at baseline  Therefore will start on low dose medication, 2.5 mg amlodipine (previous med rec from several years ago shows she took amlodipine and an ACE-I previously)        Severe protein-calorie malnutrition (HCC)- (present on admission)   Assessment & Plan    Has had little appetite   Is deconditioned  Nutrition consulted for supplement; diet has been advanced to regular + supplements rec'd by Nutrition        Hypothyroid- (present on admission)   Assessment & Plan    Stable, chronic condition  TSH 4.7  Cont levothyroxine          Quality Measures:  Lines: Central line, PIV x2     [place] Catheter: yes (urinary retention)    DVT Prophylaxis: held for now given GI bleed  Ulcer Prophylaxis: omeprazole    Antibiotics: none for now       CODE STATUS: FULL CODE  DISPO: medically cleared for the floor        .

## 2018-09-25 NOTE — PROGRESS NOTES
Critical Care Progress Note    Date of admission  9/21/2018    Chief Complaint  68 y.o. female admitted 9/21/2018 with melena and generalized weakness over the last 2-3 months, her hemoglobin on admission was 5.7, duodenal ulcer was identified with EGD, biopsies were taken and now she is on omeprazole and sucralfate. Pt received PRBC xfusion and IV fluids that resolved her hypotension     Hospital Course  ICU admission   PRBC xfusion x2 units   EGD identified doudenal ulcer with coils from previous intervention around 1 year ago     Biopsies were taken     Interval Problem Update  Reviewed last 24 hour events:  No events overnight  Continues to be hemodynamically stable  Not requiring pressors  Adequate urine output, 325 mL since midnight  Currently on RA     Review of Systems  Review of Systems   Constitutional: Negative for activity change and appetite change.   Respiratory: Negative for cough, choking, chest tightness and shortness of breath.    Cardiovascular: Negative for chest pain and leg swelling.   Gastrointestinal: Negative for abdominal distention, abdominal pain, diarrhea, nausea and vomiting.        Vital Signs for last 24 hours   Temp:  [36.7 °C (98 °F)-37.8 °C (100.1 °F)] 36.7 °C (98.1 °F)  Pulse:  [59-94] 59  Resp:  [16] 16    Physical Exam   Physical Exam   Constitutional: She appears well-developed and well-nourished. No distress.   HENT:   Head: Normocephalic and atraumatic.   Neck: No tracheal deviation present. No thyromegaly present.   Cardiovascular: Normal rate, regular rhythm, normal heart sounds and intact distal pulses.  Exam reveals no gallop and no friction rub.    No murmur heard.  Pulmonary/Chest: Effort normal and breath sounds normal. No respiratory distress. She has no wheezes. She has no rales. She exhibits no tenderness.   Abdominal: Soft. She exhibits no distension. There is no tenderness.   Neurological: She is alert.   Skin: Skin is warm. No rash noted. She is not diaphoretic.  No erythema. No pallor.   Nursing note and vitals reviewed.      Medications  Current Facility-Administered Medications   Medication Dose Route Frequency Provider Last Rate Last Dose   • magnesium sulfate IVPB premix 4 g  4 g Intravenous Once Dunia Vega M.D.       • omeprazole (PRILOSEC) capsule 20 mg  20 mg Oral BID David Hinojosa M.D.   20 mg at 09/25/18 0552   • sucralfate (CARAFATE) 1 GM/10ML suspension 1 g  1 g Oral 4X/DAY ACHS David Hinojosa M.D.   1 g at 09/25/18 0554   • acetaminophen (TYLENOL) tablet 650 mg  650 mg Oral Q6HRS PRN Owen Lawrence M.D.   650 mg at 09/23/18 1643   • lactated ringers infusion  250 mL Intravenous Once PRN Jazmyn Marina M.D.       • norepinephrine (LEVOPHED) 8 mg in  mL Infusion  0-30 mcg/min Intravenous Continuous Raj Canada M.D.   Stopped at 09/23/18 0800   • MD Alert...ICU Electrolyte Replacement per Pharmacy   Other pharmacy to dose Raj Canada M.D.       • senna-docusate (PERICOLACE or SENOKOT S) 8.6-50 MG per tablet 2 Tab  2 Tab Oral BID Jazmyn Marina M.D.   Stopped at 09/22/18 0600    And   • polyethylene glycol/lytes (MIRALAX) PACKET 1 Packet  1 Packet Oral QDAY PRN Jazmyn Marina M.D.        And   • magnesium hydroxide (MILK OF MAGNESIA) suspension 30 mL  30 mL Oral QDAY PRN Jazmyn Marina M.D.        And   • bisacodyl (DULCOLAX) suppository 10 mg  10 mg Rectal QDAY PRN Jazmyn Marina M.D.       • Respiratory Care per Protocol   Nebulization Continuous RT Jazmyn Marina M.D.       • labetalol (NORMODYNE,TRANDATE) injection 10 mg  10 mg Intravenous Q30 MIN PRN Raj Canada M.D.       • levothyroxine (SYNTHROID) tablet 112 mcg  112 mcg Oral AM ES Raj Canada M.D.   112 mcg at 09/25/18 0552   • thiamine tablet 100 mg  100 mg Oral DAILY Raj Canada M.D.   100 mg at 09/25/18 0552   • folic acid (FOLVITE) tablet 1 mg  1 mg Oral DAILY Raj Canada M.D.   1 mg at 09/25/18 0552   • multivitamin  (THERAGRAN) tablet 1 Tab  1 Tab Oral DAILY Jazmyn Marina M.D.   1 Tab at 09/25/18 0552       Fluids    Intake/Output Summary (Last 24 hours) at 09/25/18 0755  Last data filed at 09/24/18 2000   Gross per 24 hour   Intake              700 ml   Output              325 ml   Net              375 ml       Laboratory  Recent Results (from the past 48 hour(s))   CBC WITH DIFFERENTIAL    Collection Time: 09/23/18  1:30 PM   Result Value Ref Range    WBC 6.1 4.8 - 10.8 K/uL    RBC 2.39 (L) 4.20 - 5.40 M/uL    Hemoglobin 8.0 (L) 12.0 - 16.0 g/dL    Hematocrit 24.8 (L) 37.0 - 47.0 %    .8 (H) 81.4 - 97.8 fL    MCH 33.5 (H) 27.0 - 33.0 pg    MCHC 32.3 (L) 33.6 - 35.0 g/dL    RDW 83.2 (H) 35.9 - 50.0 fL    Platelet Count 222 164 - 446 K/uL    MPV 9.6 9.0 - 12.9 fL    Neutrophils-Polys 64.70 44.00 - 72.00 %    Lymphocytes 19.50 (L) 22.00 - 41.00 %    Monocytes 12.40 0.00 - 13.40 %    Eosinophils 2.30 0.00 - 6.90 %    Basophils 0.30 0.00 - 1.80 %    Immature Granulocytes 0.80 0.00 - 0.90 %    Nucleated RBC 0.70 /100 WBC    Lymphs (Absolute) 1.20 1.00 - 4.80 K/uL    Monos (Absolute) 0.76 0.00 - 0.85 K/uL    Eos (Absolute) 0.14 0.00 - 0.51 K/uL    Baso (Absolute) 0.02 0.00 - 0.12 K/uL    Immature Granulocytes (abs) 0.05 0.00 - 0.11 K/uL    NRBC (Absolute) 0.04 K/uL    Neutrophils (Absolute) 3.97 2.00 - 7.15 K/uL    Anisocytosis 1+     Macrocytosis 1+     Microcytosis 1+    PERIPHERAL SMEAR REVIEW    Collection Time: 09/23/18  1:30 PM   Result Value Ref Range    Peripheral Smear Review see below    PLATELET ESTIMATE    Collection Time: 09/23/18  1:30 PM   Result Value Ref Range    Plt Estimation Normal    MORPHOLOGY    Collection Time: 09/23/18  1:30 PM   Result Value Ref Range    RBC Morphology Present     Polychromia 1+    DIFFERENTIAL COMMENT    Collection Time: 09/23/18  1:30 PM   Result Value Ref Range    Comments-Diff see below    EKG: Every morning x 3 days    Collection Time: 09/24/18  5:04 AM   Result Value Ref  Range    Report       [facility]    Test Date:  2018  Pt Name:    ULYSSES PONCE          Department: 161  MRN:        [id number]                      Room:       T627  Gender:     Female                       Technician: HERMANN  :        [id number]                   Requested By:MOON CAMEJO  Order #:    [id number]                    Reading MD: Sarahi Turcios MD    Measurements  Intervals                                Axis  Rate:       95                           P:          66  KS:         136                          QRS:        60  QRSD:       106                          T:          40  QT:         384  QTc:        483    Interpretive Statements  SINUS RHYTHM  INCOMPLETE RIGHT BUNDLE BRANCH BLOCK  LOW VOLTAGE IN FRONTAL LEADS  Compared to ECG 2018 05:13:30  No significant change noted  Electronically Signed On 2018 6:28:43 PDT by Sarahi Turcios MD     BASIC METABOLIC PANEL    Collection Time: 18  5:15 AM   Result Value Ref Range    Sodium 140 135 - 145 mmol/L    Potassium 3.7 3.6 - 5.5 mmol/L    Chloride 111 96 - 112 mmol/L    Co2 20 20 - 33 mmol/L    Glucose 108 (H) 65 - 99 mg/dL    Bun 22 8 - 22 mg/dL    Creatinine 0.87 0.50 - 1.40 mg/dL    Calcium 7.8 (L) 8.5 - 10.5 mg/dL    Anion Gap 9.0 0.0 - 11.9   CBC WITH DIFFERENTIAL    Collection Time: 18  5:15 AM   Result Value Ref Range    WBC 6.5 4.8 - 10.8 K/uL    RBC 2.39 (L) 4.20 - 5.40 M/uL    Hemoglobin 8.0 (L) 12.0 - 16.0 g/dL    Hematocrit 24.6 (L) 37.0 - 47.0 %    .9 (H) 81.4 - 97.8 fL    MCH 33.5 (H) 27.0 - 33.0 pg    MCHC 32.5 (L) 33.6 - 35.0 g/dL    RDW 82.5 (H) 35.9 - 50.0 fL    Platelet Count 207 164 - 446 K/uL    MPV 9.5 9.0 - 12.9 fL    Neutrophils-Polys 64.90 44.00 - 72.00 %    Lymphocytes 19.30 (L) 22.00 - 41.00 %    Monocytes 12.50 0.00 - 13.40 %    Eosinophils 2.50 0.00 - 6.90 %    Basophils 0.50 0.00 - 1.80 %    Immature Granulocytes 0.30 0.00 - 0.90 %    Nucleated RBC 0.50 /100 WBC    Neutrophils  (Absolute) 4.19 2.00 - 7.15 K/uL    Lymphs (Absolute) 1.25 1.00 - 4.80 K/uL    Monos (Absolute) 0.81 0.00 - 0.85 K/uL    Eos (Absolute) 0.16 0.00 - 0.51 K/uL    Baso (Absolute) 0.03 0.00 - 0.12 K/uL    Immature Granulocytes (abs) 0.02 0.00 - 0.11 K/uL    NRBC (Absolute) 0.03 K/uL   MAGNESIUM    Collection Time: 09/24/18  5:15 AM   Result Value Ref Range    Magnesium 2.0 1.5 - 2.5 mg/dL   PHOSPHORUS    Collection Time: 09/24/18  5:15 AM   Result Value Ref Range    Phosphorus 2.3 (L) 2.5 - 4.5 mg/dL   HOMOCYSTEINE    Collection Time: 09/24/18  5:15 AM   Result Value Ref Range    Homocysteine 12.56 (H) <11.00 umol/L   ESTIMATED GFR    Collection Time: 09/24/18  5:15 AM   Result Value Ref Range    GFR If African American >60 >60 mL/min/1.73 m 2    GFR If Non African American >60 >60 mL/min/1.73 m 2   BASIC METABOLIC PANEL    Collection Time: 09/25/18  4:00 AM   Result Value Ref Range    Sodium 140 135 - 145 mmol/L    Potassium 4.5 3.6 - 5.5 mmol/L    Chloride 110 96 - 112 mmol/L    Co2 22 20 - 33 mmol/L    Glucose 99 65 - 99 mg/dL    Bun 17 8 - 22 mg/dL    Creatinine 0.89 0.50 - 1.40 mg/dL    Calcium 8.1 (L) 8.5 - 10.5 mg/dL    Anion Gap 8.0 0.0 - 11.9   CBC WITH DIFFERENTIAL    Collection Time: 09/25/18  4:00 AM   Result Value Ref Range    WBC 6.5 4.8 - 10.8 K/uL    RBC 2.46 (L) 4.20 - 5.40 M/uL    Hemoglobin 8.1 (L) 12.0 - 16.0 g/dL    Hematocrit 25.3 (L) 37.0 - 47.0 %    .8 (H) 81.4 - 97.8 fL    MCH 32.9 27.0 - 33.0 pg    MCHC 32.0 (L) 33.6 - 35.0 g/dL    RDW 81.6 (H) 35.9 - 50.0 fL    Platelet Count 206 164 - 446 K/uL    MPV 9.6 9.0 - 12.9 fL    Neutrophils-Polys 55.80 44.00 - 72.00 %    Lymphocytes 28.60 22.00 - 41.00 %    Monocytes 12.50 0.00 - 13.40 %    Eosinophils 2.30 0.00 - 6.90 %    Basophils 0.60 0.00 - 1.80 %    Immature Granulocytes 0.20 0.00 - 0.90 %    Nucleated RBC 0.00 /100 WBC    Neutrophils (Absolute) 3.61 2.00 - 7.15 K/uL    Lymphs (Absolute) 1.85 1.00 - 4.80 K/uL    Monos (Absolute) 0.81  0.00 - 0.85 K/uL    Eos (Absolute) 0.15 0.00 - 0.51 K/uL    Baso (Absolute) 0.04 0.00 - 0.12 K/uL    Immature Granulocytes (abs) 0.01 0.00 - 0.11 K/uL    NRBC (Absolute) 0.00 K/uL   MAGNESIUM    Collection Time: 09/25/18  4:00 AM   Result Value Ref Range    Magnesium 1.4 (L) 1.5 - 2.5 mg/dL   PHOSPHORUS    Collection Time: 09/25/18  4:00 AM   Result Value Ref Range    Phosphorus 3.5 2.5 - 4.5 mg/dL   ESTIMATED GFR    Collection Time: 09/25/18  4:00 AM   Result Value Ref Range    GFR If African American >60 >60 mL/min/1.73 m 2    GFR If Non African American >60 >60 mL/min/1.73 m 2       Imaging  X-Ray:  I have personally reviewed the images and compared with prior images.    Assessment/Plan  * Upper GI bleeding- (present on admission)   Assessment & Plan    S/P EGD & Biopsy   Hb stabilized, received 2 u of PRBC earlier in the admission   Hemodynaminically stable   May transfer to telemetry bed         Physical deconditioning   Assessment & Plan    PT/OT        Prolonged Q-T interval on ECG   Assessment & Plan    Last QTc was 483  Avoid QT prolonging meds          Hypothyroid- (present on admission)   Assessment & Plan    On home synthroid              I have performed a physical exam and reviewed and updated ROS and Plan today (9/25/2018). In review of yesterday's note (9/24/2018), there are no changes except as documented above.     May transfer to [facility]

## 2018-09-25 NOTE — DISCHARGE PLANNING
Received Choice form at 1240 from [facility].  Agency/Facility Name: [facility]  Referral sent per Choice form at 1240.

## 2018-09-25 NOTE — DISCHARGE PLANNING
Anticipated Discharge Disposition: SNF     Action:  LSW met with pt and her SO, Triston, at bedside. LSW explained SNF order. Pt and Triston chose [facility]. LSW faxed choice form to [facility].    Barriers to Discharge: None    Plan: LSW to monitor for acceptance to SNF.

## 2018-09-26 VITALS
DIASTOLIC BLOOD PRESSURE: 50 MMHG | HEART RATE: 70 BPM | OXYGEN SATURATION: 96 % | SYSTOLIC BLOOD PRESSURE: 104 MMHG | HEIGHT: 65 IN | RESPIRATION RATE: 16 BRPM | BODY MASS INDEX: 24.54 KG/M2 | TEMPERATURE: 97.7 F | WEIGHT: 147.27 LBS

## 2018-09-26 LAB
ANION GAP SERPL CALC-SCNC: 10 MMOL/L (ref 0–11.9)
BASOPHILS # BLD AUTO: 0.8 % (ref 0–1.8)
BASOPHILS # BLD: 0.05 K/UL (ref 0–0.12)
BUN SERPL-MCNC: 14 MG/DL (ref 8–22)
CALCIUM SERPL-MCNC: 8.5 MG/DL (ref 8.5–10.5)
CHLORIDE SERPL-SCNC: 109 MMOL/L (ref 96–112)
CO2 SERPL-SCNC: 22 MMOL/L (ref 20–33)
CREAT SERPL-MCNC: 0.86 MG/DL (ref 0.5–1.4)
EOSINOPHIL # BLD AUTO: 0.13 K/UL (ref 0–0.51)
EOSINOPHIL NFR BLD: 2 % (ref 0–6.9)
ERYTHROCYTE [DISTWIDTH] IN BLOOD BY AUTOMATED COUNT: 79.5 FL (ref 35.9–50)
GLUCOSE SERPL-MCNC: 100 MG/DL (ref 65–99)
HCT VFR BLD AUTO: 29.1 % (ref 37–47)
HGB BLD-MCNC: 9.2 G/DL (ref 12–16)
IMM GRANULOCYTES # BLD AUTO: 0.02 K/UL (ref 0–0.11)
IMM GRANULOCYTES NFR BLD AUTO: 0.3 % (ref 0–0.9)
LYMPHOCYTES # BLD AUTO: 1.68 K/UL (ref 1–4.8)
LYMPHOCYTES NFR BLD: 26.1 % (ref 22–41)
MAGNESIUM SERPL-MCNC: 1.8 MG/DL (ref 1.5–2.5)
MCH RBC QN AUTO: 33.2 PG (ref 27–33)
MCHC RBC AUTO-ENTMCNC: 31.6 G/DL (ref 33.6–35)
MCV RBC AUTO: 105.1 FL (ref 81.4–97.8)
MONOCYTES # BLD AUTO: 0.74 K/UL (ref 0–0.85)
MONOCYTES NFR BLD AUTO: 11.5 % (ref 0–13.4)
NEUTROPHILS # BLD AUTO: 3.82 K/UL (ref 2–7.15)
NEUTROPHILS NFR BLD: 59.3 % (ref 44–72)
NRBC # BLD AUTO: 0 K/UL
NRBC BLD-RTO: 0 /100 WBC
PHOSPHATE SERPL-MCNC: 2.9 MG/DL (ref 2.5–4.5)
PLATELET # BLD AUTO: 220 K/UL (ref 164–446)
PMV BLD AUTO: 9.5 FL (ref 9–12.9)
POTASSIUM SERPL-SCNC: 4 MMOL/L (ref 3.6–5.5)
RBC # BLD AUTO: 2.77 M/UL (ref 4.2–5.4)
SODIUM SERPL-SCNC: 141 MMOL/L (ref 135–145)
WBC # BLD AUTO: 6.4 K/UL (ref 4.8–10.8)

## 2018-09-26 PROCEDURE — A9270 NON-COVERED ITEM OR SERVICE: HCPCS | Performed by: INTERNAL MEDICINE

## 2018-09-26 PROCEDURE — 99232 SBSQ HOSP IP/OBS MODERATE 35: CPT | Performed by: INTERNAL MEDICINE

## 2018-09-26 PROCEDURE — 85025 COMPLETE CBC W/AUTO DIFF WBC: CPT

## 2018-09-26 PROCEDURE — 700102 HCHG RX REV CODE 250 W/ 637 OVERRIDE(OP): Performed by: INTERNAL MEDICINE

## 2018-09-26 PROCEDURE — 700102 HCHG RX REV CODE 250 W/ 637 OVERRIDE(OP): Performed by: STUDENT IN AN ORGANIZED HEALTH CARE EDUCATION/TRAINING PROGRAM

## 2018-09-26 PROCEDURE — 83735 ASSAY OF MAGNESIUM: CPT

## 2018-09-26 PROCEDURE — 80048 BASIC METABOLIC PNL TOTAL CA: CPT

## 2018-09-26 PROCEDURE — A9270 NON-COVERED ITEM OR SERVICE: HCPCS | Performed by: STUDENT IN AN ORGANIZED HEALTH CARE EDUCATION/TRAINING PROGRAM

## 2018-09-26 PROCEDURE — 84100 ASSAY OF PHOSPHORUS: CPT

## 2018-09-26 PROCEDURE — 700111 HCHG RX REV CODE 636 W/ 250 OVERRIDE (IP): Performed by: INTERNAL MEDICINE

## 2018-09-26 RX ORDER — FOLIC ACID 1 MG/1
1 TABLET ORAL DAILY
Qty: 30 TAB | Refills: 0 | Status: SHIPPED | OUTPATIENT
Start: 2018-09-27 | End: 2019-02-09

## 2018-09-26 RX ORDER — SUCRALFATE ORAL 1 G/10ML
1 SUSPENSION ORAL
Qty: 550 ML | Refills: 0 | Status: SHIPPED | OUTPATIENT
Start: 2018-09-26 | End: 2019-02-09

## 2018-09-26 RX ORDER — MAGNESIUM SULFATE HEPTAHYDRATE 40 MG/ML
2 INJECTION, SOLUTION INTRAVENOUS ONCE
Status: COMPLETED | OUTPATIENT
Start: 2018-09-26 | End: 2018-09-26

## 2018-09-26 RX ORDER — ACETAMINOPHEN 325 MG/1
650 TABLET ORAL EVERY 6 HOURS PRN
Qty: 30 TAB | Refills: 0 | Status: SHIPPED | OUTPATIENT
Start: 2018-09-26 | End: 2019-02-09

## 2018-09-26 RX ORDER — AMLODIPINE BESYLATE 5 MG/1
5 TABLET ORAL DAILY
Qty: 30 TAB | Refills: 0 | Status: SHIPPED | OUTPATIENT
Start: 2018-09-27 | End: 2019-02-09

## 2018-09-26 RX ORDER — OMEPRAZOLE 20 MG/1
20 CAPSULE, DELAYED RELEASE ORAL 2 TIMES DAILY
Qty: 60 CAP | Refills: 0 | Status: SHIPPED | OUTPATIENT
Start: 2018-09-26 | End: 2019-02-09

## 2018-09-26 RX ORDER — LANOLIN ALCOHOL/MO/W.PET/CERES
100 CREAM (GRAM) TOPICAL DAILY
Qty: 30 TAB | Refills: 0 | Status: SHIPPED | OUTPATIENT
Start: 2018-09-27 | End: 2019-02-09

## 2018-09-26 RX ORDER — AMLODIPINE BESYLATE 10 MG/1
5 TABLET ORAL
Status: DISCONTINUED | OUTPATIENT
Start: 2018-09-27 | End: 2018-09-26 | Stop reason: HOSPADM

## 2018-09-26 RX ADMIN — MAGNESIUM SULFATE HEPTAHYDRATE 2 G: 40 INJECTION, SOLUTION INTRAVENOUS at 09:21

## 2018-09-26 RX ADMIN — AMLODIPINE BESYLATE 2.5 MG: 2.5 TABLET ORAL at 05:42

## 2018-09-26 RX ADMIN — FOLIC ACID 1 MG: 1 TABLET ORAL at 05:42

## 2018-09-26 RX ADMIN — SUCRALFATE 1 G: 1 SUSPENSION ORAL at 11:48

## 2018-09-26 RX ADMIN — SUCRALFATE 1 G: 1 SUSPENSION ORAL at 05:42

## 2018-09-26 RX ADMIN — OMEPRAZOLE 20 MG: 20 CAPSULE, DELAYED RELEASE ORAL at 05:43

## 2018-09-26 RX ADMIN — LEVOTHYROXINE SODIUM 112 MCG: 112 TABLET ORAL at 05:42

## 2018-09-26 RX ADMIN — THERA TABS 1 TABLET: TAB at 05:42

## 2018-09-26 RX ADMIN — THIAMINE HCL TAB 100 MG 100 MG: 100 TAB at 05:43

## 2018-09-26 ASSESSMENT — ENCOUNTER SYMPTOMS
MEMORY LOSS: 1
SENSORY CHANGE: 0
HEADACHES: 0
APPETITE CHANGE: 0
ABDOMINAL DISTENTION: 0
WHEEZING: 0
FOCAL WEAKNESS: 0
COUGH: 0
ACTIVITY CHANGE: 0
NAUSEA: 0
ABDOMINAL PAIN: 0
COLOR CHANGE: 0
SHORTNESS OF BREATH: 0
WEAKNESS: 1
STRIDOR: 0
DIZZINESS: 0
CHOKING: 0
WOUND: 0

## 2018-09-26 ASSESSMENT — PAIN SCALES - GENERAL
PAINLEVEL_OUTOF10: 0

## 2018-09-26 NOTE — CARE PLAN
Problem: Discharge Barriers/Planning  Goal: Patient's continuum of care needs will be met  Outcome: PROGRESSING AS EXPECTED  Pt to D/C to SNF. Education and information provided regarding what to expect and POC    Problem: Skin Integrity  Goal: Risk for impaired skin integrity will decrease  Outcome: PROGRESSING AS EXPECTED  Skin assessed. Prevention measures in place to reduce risk of skin breakdown such as mobility and keeping skin dry.

## 2018-09-26 NOTE — DISCHARGE PLANNING
Anticipated Discharge Disposition: d/c to [facility] at 4PM    Action: Request PASRR be completed by fellow LSW as the network will not allow this LSW to complete one today due to error message.    Bedside RN indicates pt can transport via w/c van.    Barriers to Discharge: none    Plan: f/u w/ pt, medical team,

## 2018-09-26 NOTE — ASSESSMENT & PLAN NOTE
Increase norvasc from 2.5 to 5 mg daily  Review home meds, as of yesterday we didn't have them accurately

## 2018-09-26 NOTE — DISCHARGE INSTRUCTIONS
Discharge Instructions    Discharged to group home by medical transportation with escort. Discharged via wheelchair, hospital escort: Yes.  Special equipment needed: Walker and Wheelchair    Be sure to schedule a follow-up appointment with your primary care doctor or any specialists as instructed.     Discharge Plan:   Diet Plan: Discussed  Activity Level: Discussed  Confirmed Follow up Appointment: Patient to Call and Schedule Appointment  Confirmed Symptoms Management: Discussed  Medication Reconciliation Updated: Yes  Pneumococcal Vaccine Administered/Refused: Not given - Patient refused pneumococcal vaccine  Influenza Vaccine Indication: Patient Refuses    I understand that a diet low in cholesterol, fat, and sodium is recommended for good health. Unless I have been given specific instructions below for another diet, I accept this instruction as my diet prescription.   Other diet: Regular    Special Instructions: None    · Is patient discharged on Warfarin / Coumadin?   No     Depression / Suicide Risk    As you are discharged from this [facility] facility, it is important to learn how to keep safe from harming yourself.    Recognize the warning signs:  · Abrupt changes in personality, positive or negative- including increase in energy   · Giving away possessions  · Change in eating patterns- significant weight changes-  positive or negative  · Change in sleeping patterns- unable to sleep or sleeping all the time   · Unwillingness or inability to communicate  · Depression  · Unusual sadness, discouragement and loneliness  · Talk of wanting to die  · Neglect of personal appearance   · Rebelliousness- reckless behavior  · Withdrawal from people/activities they love  · Confusion- inability to concentrate     If you or a loved one observes any of these behaviors or has concerns about self-harm, here's what you can do:  · Talk about it- your feelings and reasons for harming yourself  · Remove any means that you  might use to hurt yourself (examples: pills, rope, extension cords, firearm)  · Get professional help from the community (Mental Health, Substance Abuse, psychological counseling)  · Do not be alone:Call your Safe Contact- someone whom you trust who will be there for you.  · Call your local CRISIS HOTLINE 370-1367 or 996-463-6927  · Call your local Children's Mobile Crisis Response Team [place] (863) 491-1200 or www.Twilio  · Call the toll free National Suicide Prevention Hotlines   · National Suicide Prevention Lifeline 524-042-QJIN (1148)  · National Hope Line Network 800-SUICIDE (275-1948)

## 2018-09-26 NOTE — DISCHARGE PLANNING
Agency/Facility Name: [facility]  Outcome: Updated PT notes sent to [facility] at 0755 for insurance authorization.

## 2018-09-26 NOTE — DISCHARGE SUMMARY
"        Internal Medicine Discharge Summary  Note Author: Dunia Vega M.D.       Name Shannon Balbuena     1950   Age/Sex 68 y.o. female   MRN [id number]         Admit Date:  2018       Discharge Date:   2018    Service:   [facility] Internal Medicine [facility] Team  Attending Physician(s):   Dr. Lawrence, Dr. Shea       Senior Resident(s):   Dr. Vega  PCP: Edvin BREEN M.D.      Primary Diagnosis:   - Upper gastrointestinal bleed due to duodenal bulb ulcer    Secondary Diagnoses:                -Hypotension due to blood loss, acute, now resolved  -Acute kidney injury, prerenal due to hypovolemia, now resolved  -Hypokalemia, acute and moderate, now improved  -Hypomagnesemia, acute and moderate, now improved  -Macrocytic anemia, chronicity unclear, likely mixed picture blood loss and folate +/- B12 deficiency  -Protein calorie malnutrition, subacute vs chronic  -Hypothyroidism, chronic and stable  -Alcohol abuse, chronic, without evidence of acute withdrawal  -Hepatitis C infection, chronic and untreated  -Hypertension, chronic and untreated at baseline    Hospital Summary (by problem):       Shannon Balbuena is 68 y.o. with a past medical history of chronic untreated Hepatitis C infection; chronic alcohol abuse with 2, 6-8 oz shots of bourbon per night; stable hypothyroidism; chronic hypertension who presented on  following ground level fall with finding of severe anemia and complaint of melena of unclear chronicity (patient account varies from \"recently\" to \"3 months\").     # Upper gastroinestinal bleed due to duodenal bulb ulcer  -As above, presented on  following ground level fall with finding of severe anemia and complaint of melena of unclear chronicity (patient account varies from \"recently\" to \"3 months\")  -found in emergency department to have Hgb 5.7 requiring 2 units of packed red blood cells overnight, intravenous fluid boluses, pressors, intravenous protonix. Home " anti-hypertensives were held.   -Gastroenterology was consulted and she underwent endoscopy the morning following admission; a chronic-appearing, non-bleeding duodenal bulb ulcer was found and biopsied; gastroenterology also recommended resection if re-bleed occurs, as well as long-term proton pump inhibitor therapy  -she was continued on fluids and pressors, and on 9/23 morning pressors were discontinued and she was begun on plasmalyte infusion at 125 cc/hour which was later discontinued following improvement.  -central line removed 9/24 due to improvement  -blood pressure has stabilized and indeed she now has some hypertension; this morning's labs show further resolution of anemia with rise in Hgb now to 9.2; no melena since evening of 9/24 (and even then there was only single episode followed by normal bowel movements)  -Gastroenterology would like to see her at outpatient follow-up     # Macrocytic anemia  -felt to likely represent mixed picture blood loss/iron deficiency anemia plus B12 and/or folate deficiency  -folate and B12 were ordered, both within normal limits (folate at lower end of normal)  -homocysteine high confirming folate deficiency component  -anemia is improving as above, 9/26 morning up to 9.2     # Acute kidney injury, prerenal, now resolved  -Presented with creatinine of 1.45 which has steadily improved following fluids and stabilization of blood pressure.   -She was also found to have urinary retention the morning following admission, so to clarify etiology, FeNa was calculated at .2% confirming prerenal source, likely due to acute blood loss  -urinary retention improved, bhardwaj was removed, and kidney injury has now resolved (creatinine morning of 9/26 was .86)     # Hypokalemia  # Hypomagnesemia  -found on labs on 9/23 in the morning and repleted  -goal K >4 and Mag >2 given prolonged QTc  -improved since; both within normal limits on day of discharge     # Prolonged QTc interval   -QTc 513  found on initial admission, possibly secondary to alcohol abuse  -serial EKGs (Q72H) performed and do show improvement, though prolongation remains (most recent 483 on 9/24)     # Protein-calorie malnutrition  # Physical deconditioning  -felt likely partly due to alcohol abuse, and partly due to patient's recent loss of appetite  -nutrition consulted, recommended supplements while inpatient   -diet advanced following endoscopy and patient is now on regular diet  -Physical therapy consulted for deconditioning and felt patient will require skilled nursing stay     CHRONIC PROBLEMS:  --Hypothyroidism: TSH within normal limits; she was continued on home levothyroxine     --Hypertension: not initially a problem as she arrived with hypotension due to blood loss; however following stabilization she now again shows some hypertension. She was begun on 2.5 mg amlodipine on 9/25, which was increased to 5 mg daily on 9/26 prior to discharge.      --Alcohol abuse: CIWA begun but patient did not demonstrate withdrawal symptoms this stay nor required Ativan. Supplementing with thiamine, folate, and multivitamin. She may benefit from continued alcohol cessation resources.     --Chronic Hepatitis C, untreated: abdominal ultrasound performed and demonstrated fatty infiltration of liver and only very trace ascites. Given lack of leukocytosis, only trace ascites, lack of other symptoms, prophylactic ceftriaxone not begun this hospitalization. Gastroenterology would like her to follow up with them (Dr. Maharaj) for this.    Consultants:     GI:  Dr. Maharaj/Dr. Hinojosa  PMA: Dr. Lawrence, Dr. Shea     Procedures:        -central line placement, 9/21  -esophagogastroduodenoscopy, 9/22    Imaging/ Testing:      US-ABDOMEN COMPLETE SURVEY   Final Result      1.  Probable fatty infiltration of liver with small LEFT lobe cysts.   2.  Trace ascites.   3.  Cholelithiasis.   4.  No evidence for acute cholecystitis or biliary obstruction.       DX-CHEST-PORTABLE (1 VIEW)   Final Result         Left central venous catheter with tip in the mid SVC.      DX-CHEST-PORTABLE (1 VIEW)   Final Result         1. No acute cardiopulmonary abnormalities are identified.      DX-CHEST-PORTABLE (1 VIEW)   Final Result         1. No acute cardiopulmonary abnormalities are identified.      CT-HEAD W/O   Final Result      1. Cerebral atrophy.   2. White matter lucencies most consistent with small vessel ischemic change versus demyelination or gliosis.   3. Otherwise, Head CT without contrast with no acute findings. No evidence of acute cerebral infarction, hemorrhage or mass lesion.          Discharge Medications:         Medication Reconciliation: Completed       Medication List      START taking these medications      Instructions   acetaminophen 325 MG Tabs  Commonly known as:  TYLENOL   Take 2 Tabs by mouth every 6 hours as needed.  Dose:  650 mg     amLODIPine 5 MG Tabs  Commonly known as:  NORVASC   Take 1 Tab by mouth every day.  Dose:  5 mg     folic acid 1 MG Tabs  Commonly known as:  FOLVITE   Take 1 Tab by mouth every day.  Dose:  1 mg     multivitamin Tabs   Take 1 Tab by mouth every day.  Dose:  1 Tab     omeprazole 20 MG delayed-release capsule  Commonly known as:  PRILOSEC   Take 1 Cap by mouth 2 Times a Day.  Dose:  20 mg     sucralfate 1 GM/10ML Susp  Commonly known as:  CARAFATE   Take 10 mL by mouth 4 Times a Day,Before Meals and at Bedtime.  Dose:  1 g     thiamine 100 MG tablet  Commonly known as:  THIAMINE   Take 1 Tab by mouth every day.  Dose:  100 mg        CONTINUE taking these medications      Instructions   levothyroxine 112 MCG Tabs  Commonly known as:  SYNTHROID   Take 1 Tab by mouth every day.  Dose:  112 mcg            Can use .DISCHARGEMEDSLIST if going to another facility         Disposition:   To Skilled Nursing for continued therapy    Diet:   Regular diet    Activity:   As tolerated and as directed by physical therapy    Instructions:       Recommend patient return in event of re-bleeding, new hemodynamic instability, or other new or progressive symptoms.    The patient was instructed to return to the ER in the event of worsening symptoms. I have counseled the patient on the importance of compliance and the patient has agreed to proceed with all medical recommendations and follow up plan indicated above.   The patient understands that all medications come with benefits and risks. Risks may include permanent injury or death and these risks can be minimized with close reassessment and monitoring.        Primary Care Provider:    Edvin BREEN M.D.  Discharge summary faxed to primary care provider:  Completed  Copy of discharge summary given to the patient: Deferred      Follow up appointment details :      -needs f/u with PCP (Dr. Reid), GI (Dr. Maharaj)    Pending Studies:        Not applicable    Time spent on discharge day patient visit, preparing discharge paperwork and arranging for patient follow up.    Summary of follow up issues:   -will require follow up with gastroenterology (Dr. Maharaj) for treatment of chronic Hepatitis C and for follow-up after endoscopy  -recommend follow up with PCP for post-hospitalization discharge including for optimization of anti-hypertensives, further management of chronic alcohol abuse, re-check on electrolytes (particularly K, Mag), continued PPI Rx, other general follow up    Discharge Time (Minutes) :    30  Hospital Course Type:  Inpatient Stay >2 midnights      Condition on Discharge  Stable  ______________________________________________________________________    Most Recent Labs:    Lab Results   Component Value Date/Time    WBC 6.4 09/26/2018 06:50 AM    RBC 2.77 (L) 09/26/2018 06:50 AM    HEMOGLOBIN 9.2 (L) 09/26/2018 06:50 AM    HEMATOCRIT 29.1 (L) 09/26/2018 06:50 AM    .1 (H) 09/26/2018 06:50 AM    MCH 33.2 (H) 09/26/2018 06:50 AM    MCHC 31.6 (L) 09/26/2018 06:50 AM    MPV  9.5 09/26/2018 06:50 AM    NEUTSPOLYS 59.30 09/26/2018 06:50 AM    LYMPHOCYTES 26.10 09/26/2018 06:50 AM    MONOCYTES 11.50 09/26/2018 06:50 AM    EOSINOPHILS 2.00 09/26/2018 06:50 AM    BASOPHILS 0.80 09/26/2018 06:50 AM    ANISOCYTOSIS 1+ 09/23/2018 01:30 PM      Lab Results   Component Value Date/Time    SODIUM 141 09/26/2018 06:50 AM    POTASSIUM 4.0 09/26/2018 06:50 AM    CHLORIDE 109 09/26/2018 06:50 AM    CO2 22 09/26/2018 06:50 AM    GLUCOSE 100 (H) 09/26/2018 06:50 AM    BUN 14 09/26/2018 06:50 AM    CREATININE 0.86 09/26/2018 06:50 AM    CREATININE 0.6 10/01/2007 04:53 PM      Lab Results   Component Value Date/Time    ALTSGPT 9 09/21/2018 06:16 PM    ASTSGOT 20 09/21/2018 06:16 PM    ALKPHOSPHAT 72 09/21/2018 06:16 PM    TBILIRUBIN 0.7 09/21/2018 06:16 PM    DBILIRUBIN 0.3 09/21/2018 06:16 PM    LIPASE 101 (H) 07/18/2014 03:38 PM    ALBUMIN 3.7 09/21/2018 06:16 PM    GLOBULIN 3.8 (H) 03/01/2017 06:20 AM    INR 1.14 (H) 09/21/2018 06:16 PM    MACROCYTOSIS 1+ 09/23/2018 01:30 PM     Lab Results   Component Value Date/Time    PROTHROMBTM 14.7 (H) 09/21/2018 06:16 PM    INR 1.14 (H) 09/21/2018 06:16 PM

## 2018-09-26 NOTE — DISCHARGE PLANNING
Anticipated Discharge Disposition: d/c to snf    Action: Spoke to bedside RN. Pt can d/c today as medically cleared.    Spoke to  at [facility]. Advised new PT note in yesterday.  '  Provided contact information for COL[facility] Ordonez.    Barriers to Discharge: acceptance, INS AUTH, transport    Plan: f/u w/ [facility], medical team

## 2018-09-26 NOTE — PROGRESS NOTES
Pt HR dipped to the 30's. Dr Adrian notified. Ordered to decrease Amio gtt from 1 to 0.5 at this time

## 2018-09-26 NOTE — DISCHARGE PLANNING
Anticipated Discharge Disposition: [place]    Action: Received report from unit LSW, pt has been scheduled to transport to [facility] at 4pm. LSW met with pt at bedside and discussed above. Pt is agreeable to transfer to [place] and requested LSW call her friend Triston #[phone]. Called friend Triston and updated. Pt signed COBRA and discussed  IMM letter and pt initialed. COBRA completed, chart copy completed, d/c summary completed. Reviewed H/P, no indication of MH/psych. Completed PASRR ID#: [id number]. Updated bedside RN.     Barriers to Discharge: N/A.     Plan: As above, pt to transfer to [facility] at 4pm via [facility].

## 2018-09-26 NOTE — PROGRESS NOTES
Pt escorted off the floor via wheelchair by transport to [facility] without incident. Discharge paperwork reviewed and signed. All questions and concerns addressed.

## 2018-09-26 NOTE — PROGRESS NOTES
"[facility] Progress Note      Admit Date: 9/21/2018    Resident: Dunia Vega, PGY-2  Attending: VARUN CENTENO/ Dr. Owen Lawrence/Dr. Gonda/Dr. Shea    Date & Time:   9/26/2018   10:38 AM       Patient ID:    Name:             Shannon Balbuena   YOB: 1950  Age:                 68 y.o.  female   MRN:               [id number]    HPI:  Per Dr. Marina's HPI 9/21:   \"Patient is a 68-year-old lady who was brought in due to a ground-level fall that happened earlier today after receiving a shock from an outlet in her home.  She denies any loss of consciousness.  Patient states she has associated generalized malaise/fatigue, SOB, weakness and decreased appetite.  She also hesitantly stated that she has been having dark stool and coffee-ground emesis for the past 3 months.  She denies taking any NSAIDs, but does endorse drinking 2 shots of bourbon every night - about 4 ounces each.  She has a history of a significant upper GI bleed with duodenal ulcer clipping in 2017.  Patient has not had follow-up with GI since then.\"    Interval Events:  -remained hemodynamically stable overnight with increased Hgb this am (8>9), BPs from normotensive to hypertensive after starting very low dose amlodipine yesterday (2.5 mg Qd)  -SNF referral pending    Review of Systems   Constitutional: Positive for malaise/fatigue.        Unsure whether weight loss. Does not measure and states she wears baggy clothes so would not notice a difference in how they fit   Respiratory: Negative for cough and shortness of breath.    Cardiovascular: Negative for chest pain.   Gastrointestinal: Positive for melena. Negative for abdominal pain and nausea.   Genitourinary:        Note: there was urinary retention early in hospitalization, but patient was not aware   Musculoskeletal: Positive for joint pain.   Neurological: Positive for weakness. Negative for dizziness, sensory change, focal weakness and headaches. "   Psychiatric/Behavioral: Positive for memory loss.        Alcohol abuse.        Physical Exam   Constitutional: No distress.   No acute distress. Mildly malodorous. Very pleasant.   HENT:   Head: Normocephalic and atraumatic.   Eyes: Conjunctivae and EOM are normal. No scleral icterus.   Mildly pale conjunctivae.    Neck: Neck supple.   Cardiovascular: Normal rate and regular rhythm.    No murmur heard.  Pulmonary/Chest: Effort normal. She has no wheezes. She has no rales.   Abdominal: Soft. Bowel sounds are normal. She exhibits no distension and no mass. There is no tenderness. There is no guarding.   Non-distended.   Musculoskeletal: She exhibits no edema.   Neurological: She exhibits normal muscle tone.   Alert and oriented to person, place, and situation but not to time (could not give month or year).    Skin: Skin is warm and dry. There is pallor.   Psychiatric: Her behavior is normal. Thought content normal.   Flat affect     Consultants:  GI:  Dr. Maharaj/Dr. Hinojosa  PMA: Dr. Lawrence, Dr. Shea     Procedures:  -central line placement, 9/21  -EGD 9/22    Imaging:  US-ABDOMEN COMPLETE SURVEY   Final Result      1.  Probable fatty infiltration of liver with small LEFT lobe cysts.   2.  Trace ascites.   3.  Cholelithiasis.   4.  No evidence for acute cholecystitis or biliary obstruction.      DX-CHEST-PORTABLE (1 VIEW)   Final Result         Left central venous catheter with tip in the mid SVC.      DX-CHEST-PORTABLE (1 VIEW)   Final Result         1. No acute cardiopulmonary abnormalities are identified.      DX-CHEST-PORTABLE (1 VIEW)   Final Result         1. No acute cardiopulmonary abnormalities are identified.      CT-HEAD W/O   Final Result      1. Cerebral atrophy.   2. White matter lucencies most consistent with small vessel ischemic change versus demyelination or gliosis.   3. Otherwise, Head CT without contrast with no acute findings. No evidence of acute cerebral infarction, hemorrhage or mass  lesion.        Respiratory:  Pulse Oximetry: 96 %    HemoDynamics:  Pulse: 94 NIBP: 160/88      Fluids:    Intake/Output Summary (Last 24 hours) at 18 1038  Last data filed at 18 0600   Gross per 24 hour   Intake              200 ml   Output                0 ml   Net              200 ml        Body mass index is 24.51 kg/m².    Recent Labs      18   0515  18   0400  18   0650   SODIUM  140  140  141   POTASSIUM  3.7  4.5  4.0   CHLORIDE  111  110  109   CO2  20  22  22   BUN  22  17  14   CREATININE  0.87  0.89  0.86   MAGNESIUM  2.0  1.4*  1.8   PHOSPHORUS  2.3*  3.5  2.9   CALCIUM  7.8*  8.1*  8.5       GI/Nutrition:  Recent Labs      18   0515  18   0400  18   0650   GLUCOSE  108*  99  100*       Heme:  Recent Labs      18   0515  18   0400  18   0650   RBC  2.39*  2.46*  2.77*   HEMOGLOBIN  8.0*  8.1*  9.2*   HEMATOCRIT  24.6*  25.3*  29.1*   PLATELETCT  207  206  220       Infectious Disease:  Temp  Av.8 °C (100 °F)  Min: 37.8 °C (100 °F)  Max: 37.8 °C (100 °F)  Recent Labs      18   0515  18   0400  18   0650   WBC  6.5  6.5  6.4   NEUTSPOLYS  64.90  55.80  59.30   LYMPHOCYTES  19.30*  28.60  26.10   MONOCYTES  12.50  12.50  11.50   EOSINOPHILS  2.50  2.30  2.00   BASOPHILS  0.50  0.60  0.80       Meds:  • magnesium sulfate  2 g Stopped (18 1121)   • [START ON 2018] amLODIPine  5 mg     • omeprazole  20 mg     • sucralfate  1 g     • acetaminophen  650 mg     • LR  250 mL     • MD Alert...Adult ICU Electrolyte Replacement per Pharmacy       • senna-docusate  2 Tab      And   • polyethylene glycol/lytes  1 Packet      And   • magnesium hydroxide  30 mL      And   • bisacodyl  10 mg     • Respiratory Care per Protocol       • labetalol  10 mg     • levothyroxine  112 mcg     • thiamine  100 mg     • folic acid  1 mg     • multivitamin  1 Tab            Assessment and Plan:  * Upper GI bleeding- (present on  admission)   Assessment & Plan    Melena and hematemesis for 3 months  No NSAIDs, but has been drinking [place] daily  Hb 5.7 on initial admit, received 2 units PRBCs   Hb stable   Now off pressors   EGD: clean-based chronic appearing duodenal ulcer, took bx    Plan:  PRBC when Hb <7  H&H QD  Long term high dose PPI per GI; will also continue sucralfate at discharge  SCDs, no NSAIDs or pharmx DVT prophx  Diet advanced 9/24 to regular  At discharge will need f/u appt with Dr. Maharaj both re: UGI bleed and for chronic HCV        Physical deconditioning   Assessment & Plan    Has generalized weakness  Muscle atrophy  SNF referral pending.         Prolonged Q-T interval on ECG   Assessment & Plan    QTc 513>> now 483  Serial EKGs  Avoid QTc prolonging meds when possible        Hypokalemia   Assessment & Plan    Replete as needed        Hypomagnesemia   Assessment & Plan    Replete as needed        Macrocytic anemia   Assessment & Plan    Folate and B12 wnl, folate low normal with high homocysteine   On folate        Alcohol use   Assessment & Plan    Drinks 2, 4-5oz bourban shots a night for many years  DC'd CIWA   Thiamine/Folate/MV  -B12 and folate wnl, though folate was low normal  -High homocysteine - on folate        Chronic hepatitis C virus infection (HCC)   Assessment & Plan    Untreated  Visceral angiogram in 2017 shows signs of portal htn  -US abd showed scant ascites so no abx prophylaxis started  Will need f/u with Dr. Maharaj (GI) as outpt for treatment        Essential hypertension- (present on admission)   Assessment & Plan    Hold BP meds in setting of GI bleed   Pharmacy med-rec 9/25 shows she was not taking any anti-hypertensives at baseline  Increasing amlodipine from 2.5 to 5 mg Qd        Severe protein-calorie malnutrition (HCC)- (present on admission)   Assessment & Plan    Has had little appetite   Is deconditioned  Nutrition consulted for supplement; diet has been advanced to regular +  supplements rec'd by Nutrition        Hypothyroid- (present on admission)   Assessment & Plan    Stable, chronic condition  TSH 4.7  Cont levothyroxine          Quality Measures:  Lines: Central line now removed    [place] Catheter: no (removed)   DVT Prophylaxis: held for now given GI bleed  Ulcer Prophylaxis: omeprazole (changed from IV to PO by GI)    Antibiotics: none for now       CODE STATUS: FULL CODE  DISPO: medically cleared for the floor; SNF pending        .

## 2018-09-26 NOTE — DISCHARGE PLANNING
Agency/Facility Name: [facility]  Spoke To: [facility]  Outcome: Insurance accepted. Transportation scheduled for 9/26 at 1600 via [place]. KT Izquierdo notified of transportation time.

## 2018-09-26 NOTE — PROGRESS NOTES
Critical Care Progress Note    Date of admission  9/21/2018    Chief Complaint  68 y.o. female admitted 9/21/2018 with melena and generalized weakness over the last 2-3 months, her hemoglobin on admission was 5.7, duodenal ulcer was identified with EGD, biopsies were taken and now she is on omeprazole and sucralfate. Pt received PRBC xfusion and IV fluids that resolved her hypotension     Hospital Course  ICU admission   PRBC xfusion x2 units   EGD identified doudenal ulcer with coils from previous intervention around 1 year ago     Biopsies were taken   Improving, waiting on medical bed for 2-3 days     Interval Problem Update  Reviewed last 24 hour events:  No events overnight  Continues to be hemodynamically stable, actually slightly hypertensive   Adequate urine output, but inaccurate I/O as Piedra is out   Currently on RA     Review of Systems  Review of Systems   Constitutional: Negative for activity change and appetite change.   Respiratory: Negative for cough, choking, shortness of breath, wheezing and stridor.    Cardiovascular: Negative for chest pain and leg swelling.   Gastrointestinal: Negative for abdominal distention and abdominal pain.   Skin: Negative for color change, pallor, rash and wound.        Vital Signs for last 24 hours   Temp:  [37.8 °C (100 °F)] 37.8 °C (100 °F)  Pulse:  [94] 94      Physical Exam   Physical Exam   Constitutional: She appears well-developed and well-nourished. No distress.   HENT:   Head: Normocephalic and atraumatic.   Neck: No tracheal deviation present. No thyromegaly present.   Cardiovascular: Normal rate, regular rhythm, normal heart sounds and intact distal pulses.  Exam reveals no gallop and no friction rub.    No murmur heard.  Pulmonary/Chest: Effort normal and breath sounds normal. No respiratory distress. She has no wheezes. She has no rales. She exhibits no tenderness.   Abdominal: Soft. She exhibits no distension. There is no tenderness.   Neurological: She is  alert.   Cooperative, follows commands   Skin: Skin is warm. No rash noted. She is not diaphoretic. No erythema. No pallor.   Nursing note and vitals reviewed.      Medications  Current Facility-Administered Medications   Medication Dose Route Frequency Provider Last Rate Last Dose   • magnesium sulfate IVPB premix 2 g  2 g Intravenous Once Michael Shea M.D.   Stopped at 09/26/18 1121   • amLODIPine (NORVASC) tablet 2.5 mg  2.5 mg Oral Q DAY Dunia Vega M.D.   2.5 mg at 09/26/18 0542   • omeprazole (PRILOSEC) capsule 20 mg  20 mg Oral BID David Hinojosa M.D.   20 mg at 09/26/18 0543   • sucralfate (CARAFATE) 1 GM/10ML suspension 1 g  [facility]   1 g at 09/26/18 0542   • acetaminophen (TYLENOL) tablet 650 mg  650 mg Oral Q6HRS PRN Owen Lawrence M.D.   650 mg at 09/23/18 1643   • lactated ringers infusion  250 mL Intravenous Once PRN Jazmyn Marina M.D.       • MD Alert...ICU Electrolyte Replacement per Pharmacy   Other pharmacy to dose Raj Canada M.D.       • senna-docusate (PERICOLACE or SENOKOT S) 8.6-50 MG per tablet 2 Tab  2 Tab Oral BID Jazmyn Marina M.D.   Stopped at 09/22/18 0600    And   • polyethylene glycol/lytes (MIRALAX) PACKET 1 Packet  1 Packet Oral QDAY PRN Jazmyn Marina M.D.        And   • magnesium hydroxide (MILK OF MAGNESIA) suspension 30 mL  30 mL Oral QDAY PRN Jazmyn Marina M.D.        And   • bisacodyl (DULCOLAX) suppository 10 mg  10 mg Rectal QDAY PRN Jazmyn Marina M.D.       • Respiratory Care per Protocol   Nebulization Continuous RT Jazmyn Marina M.D.       • labetalol (NORMODYNE,TRANDATE) injection 10 mg  10 mg Intravenous Q30 MIN PRN Raj Canada M.D.       • levothyroxine (SYNTHROID) tablet 112 mcg  112 mcg Oral AM ES Raj Canada M.D.   112 mcg at 09/26/18 0542   • thiamine tablet 100 mg  100 mg Oral DAILY Raj Canada M.D.   100 mg at 09/26/18 0543   • folic acid (FOLVITE) tablet 1 mg  1 mg  Oral DAILY Raj Canada M.D.   1 mg at 09/26/18 0542   • multivitamin (THERAGRAN) tablet 1 Tab  1 Tab Oral DAILY Jazmyn Marina M.D.   1 Tab at 09/26/18 0542       Fluids    Intake/Output Summary (Last 24 hours) at 09/26/18 1031  Last data filed at 09/26/18 0600   Gross per 24 hour   Intake              200 ml   Output                0 ml   Net              200 ml       Laboratory  Recent Results (from the past 48 hour(s))   BASIC METABOLIC PANEL    Collection Time: 09/25/18  4:00 AM   Result Value Ref Range    Sodium 140 135 - 145 mmol/L    Potassium 4.5 3.6 - 5.5 mmol/L    Chloride 110 96 - 112 mmol/L    Co2 22 20 - 33 mmol/L    Glucose 99 65 - 99 mg/dL    Bun 17 8 - 22 mg/dL    Creatinine 0.89 0.50 - 1.40 mg/dL    Calcium 8.1 (L) 8.5 - 10.5 mg/dL    Anion Gap 8.0 0.0 - 11.9   CBC WITH DIFFERENTIAL    Collection Time: 09/25/18  4:00 AM   Result Value Ref Range    WBC 6.5 4.8 - 10.8 K/uL    RBC 2.46 (L) 4.20 - 5.40 M/uL    Hemoglobin 8.1 (L) 12.0 - 16.0 g/dL    Hematocrit 25.3 (L) 37.0 - 47.0 %    .8 (H) 81.4 - 97.8 fL    MCH 32.9 27.0 - 33.0 pg    MCHC 32.0 (L) 33.6 - 35.0 g/dL    RDW 81.6 (H) 35.9 - 50.0 fL    Platelet Count 206 164 - 446 K/uL    MPV 9.6 9.0 - 12.9 fL    Neutrophils-Polys 55.80 44.00 - 72.00 %    Lymphocytes 28.60 22.00 - 41.00 %    Monocytes 12.50 0.00 - 13.40 %    Eosinophils 2.30 0.00 - 6.90 %    Basophils 0.60 0.00 - 1.80 %    Immature Granulocytes 0.20 0.00 - 0.90 %    Nucleated RBC 0.00 /100 WBC    Neutrophils (Absolute) 3.61 2.00 - 7.15 K/uL    Lymphs (Absolute) 1.85 1.00 - 4.80 K/uL    Monos (Absolute) 0.81 0.00 - 0.85 K/uL    Eos (Absolute) 0.15 0.00 - 0.51 K/uL    Baso (Absolute) 0.04 0.00 - 0.12 K/uL    Immature Granulocytes (abs) 0.01 0.00 - 0.11 K/uL    NRBC (Absolute) 0.00 K/uL   MAGNESIUM    Collection Time: 09/25/18  4:00 AM   Result Value Ref Range    Magnesium 1.4 (L) 1.5 - 2.5 mg/dL   PHOSPHORUS    Collection Time: 09/25/18  4:00 AM   Result Value Ref Range     Phosphorus 3.5 2.5 - 4.5 mg/dL   ESTIMATED GFR    Collection Time: 09/25/18  4:00 AM   Result Value Ref Range    GFR If African American >60 >60 mL/min/1.73 m 2    GFR If Non African American >60 >60 mL/min/1.73 m 2   BASIC METABOLIC PANEL    Collection Time: 09/26/18  6:50 AM   Result Value Ref Range    Sodium 141 135 - 145 mmol/L    Potassium 4.0 3.6 - 5.5 mmol/L    Chloride 109 96 - 112 mmol/L    Co2 22 20 - 33 mmol/L    Glucose 100 (H) 65 - 99 mg/dL    Bun 14 8 - 22 mg/dL    Creatinine 0.86 0.50 - 1.40 mg/dL    Calcium 8.5 8.5 - 10.5 mg/dL    Anion Gap 10.0 0.0 - 11.9   CBC WITH DIFFERENTIAL    Collection Time: 09/26/18  6:50 AM   Result Value Ref Range    WBC 6.4 4.8 - 10.8 K/uL    RBC 2.77 (L) 4.20 - 5.40 M/uL    Hemoglobin 9.2 (L) 12.0 - 16.0 g/dL    Hematocrit 29.1 (L) 37.0 - 47.0 %    .1 (H) 81.4 - 97.8 fL    MCH 33.2 (H) 27.0 - 33.0 pg    MCHC 31.6 (L) 33.6 - 35.0 g/dL    RDW 79.5 (H) 35.9 - 50.0 fL    Platelet Count 220 164 - 446 K/uL    MPV 9.5 9.0 - 12.9 fL    Neutrophils-Polys 59.30 44.00 - 72.00 %    Lymphocytes 26.10 22.00 - 41.00 %    Monocytes 11.50 0.00 - 13.40 %    Eosinophils 2.00 0.00 - 6.90 %    Basophils 0.80 0.00 - 1.80 %    Immature Granulocytes 0.30 0.00 - 0.90 %    Nucleated RBC 0.00 /100 WBC    Neutrophils (Absolute) 3.82 2.00 - 7.15 K/uL    Lymphs (Absolute) 1.68 1.00 - 4.80 K/uL    Monos (Absolute) 0.74 0.00 - 0.85 K/uL    Eos (Absolute) 0.13 0.00 - 0.51 K/uL    Baso (Absolute) 0.05 0.00 - 0.12 K/uL    Immature Granulocytes (abs) 0.02 0.00 - 0.11 K/uL    NRBC (Absolute) 0.00 K/uL   MAGNESIUM    Collection Time: 09/26/18  6:50 AM   Result Value Ref Range    Magnesium 1.8 1.5 - 2.5 mg/dL   PHOSPHORUS    Collection Time: 09/26/18  6:50 AM   Result Value Ref Range    Phosphorus 2.9 2.5 - 4.5 mg/dL   ESTIMATED GFR    Collection Time: 09/26/18  6:50 AM   Result Value Ref Range    GFR If African American >60 >60 mL/min/1.73 m 2    GFR If Non African American >60 >60 mL/min/1.73 m 2        Imaging  X-Ray:  I have personally reviewed the images and compared with prior images.    Assessment/Plan  * Upper GI bleeding- (present on admission)   Assessment & Plan    S/P EGD & Biopsy   Hb stabilized, received 2 u of PRBC earlier in the admission   Hemodynaminically stable   May transfer to telemetry bed         Physical deconditioning   Assessment & Plan    PT/OT        Prolonged Q-T interval on ECG   Assessment & Plan    Last QTc was 483  Avoid QT prolonging meds          Essential hypertension- (present on admission)   Assessment & Plan    Increase norvasc from 2.5 to 5 mg daily  Review home meds, as of yesterday we didn't have them accurately         Hypothyroid- (present on admission)   Assessment & Plan    On home synthroid              VTE:  Contraindicated  Ulcer: PPI  Lines: None    I have performed a physical exam and reviewed and updated ROS and Plan today (9/26/2018). In review of yesterday's note (9/25/2018), there are no changes except as documented above.     Waiting for SNF placement.

## 2018-10-29 NOTE — ADDENDUM NOTE
Encounter addended by: Michael Shea M.D. on: 10/29/2018  4:29 PM<BR>    Actions taken: Edit attestation on clinical note

## 2019-02-09 ENCOUNTER — APPOINTMENT (OUTPATIENT)
Dept: RADIOLOGY | Facility: MEDICAL CENTER | Age: 69
DRG: 536 | End: 2019-02-09
Attending: EMERGENCY MEDICINE
Payer: MEDICARE

## 2019-02-09 ENCOUNTER — HOSPITAL ENCOUNTER (INPATIENT)
Facility: MEDICAL CENTER | Age: 69
LOS: 4 days | DRG: 536 | End: 2019-02-13
Attending: EMERGENCY MEDICINE | Admitting: INTERNAL MEDICINE
Payer: MEDICARE

## 2019-02-09 DIAGNOSIS — S32.509D CLOSED FRACTURE OF PUBIS WITH ROUTINE HEALING, UNSPECIFIED PORTION OF PUBIS, UNSPECIFIED LATERALITY, SUBSEQUENT ENCOUNTER: ICD-10-CM

## 2019-02-09 DIAGNOSIS — S32.501A CLOSED FRACTURE OF RIGHT PUBIS, UNSPECIFIED PORTION OF PUBIS, INITIAL ENCOUNTER (HCC): ICD-10-CM
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
## 2019-02-09 PROBLEM — N30.00 ACUTE CYSTITIS WITHOUT HEMATURIA: Status: ACTIVE | Noted: 2019-02-09

## 2019-02-09 PROBLEM — S32.9XXA CLOSED FRACTURE OF PELVIS (HCC): Status: ACTIVE | Noted: 2019-02-09

## 2019-02-09 LAB
ANION GAP SERPL CALC-SCNC: 14 MMOL/L (ref 0–11.9)
BASOPHILS # BLD AUTO: 0.6 % (ref 0–1.8)
BASOPHILS # BLD: 0.04 K/UL (ref 0–0.12)
BUN SERPL-MCNC: 27 MG/DL (ref 8–22)
CALCIUM SERPL-MCNC: 9.9 MG/DL (ref 8.5–10.5)
CHLORIDE SERPL-SCNC: 108 MMOL/L (ref 96–112)
CO2 SERPL-SCNC: 20 MMOL/L (ref 20–33)
CREAT SERPL-MCNC: 1.06 MG/DL (ref 0.5–1.4)
EOSINOPHIL # BLD AUTO: 0.09 K/UL (ref 0–0.51)
EOSINOPHIL NFR BLD: 1.3 % (ref 0–6.9)
ERYTHROCYTE [DISTWIDTH] IN BLOOD BY AUTOMATED COUNT: 50.3 FL (ref 35.9–50)
GLUCOSE SERPL-MCNC: 105 MG/DL (ref 65–99)
HCT VFR BLD AUTO: 40.9 % (ref 37–47)
HGB BLD-MCNC: 13.6 G/DL (ref 12–16)
IMM GRANULOCYTES # BLD AUTO: 0.02 K/UL (ref 0–0.11)
IMM GRANULOCYTES NFR BLD AUTO: 0.3 % (ref 0–0.9)
LYMPHOCYTES # BLD AUTO: 1.71 K/UL (ref 1–4.8)
LYMPHOCYTES NFR BLD: 24.3 % (ref 22–41)
MCH RBC QN AUTO: 35.8 PG (ref 27–33)
MCHC RBC AUTO-ENTMCNC: 33.3 G/DL (ref 33.6–35)
MCV RBC AUTO: 107.6 FL (ref 81.4–97.8)
MONOCYTES # BLD AUTO: 0.61 K/UL (ref 0–0.85)
MONOCYTES NFR BLD AUTO: 8.7 % (ref 0–13.4)
NEUTROPHILS # BLD AUTO: 4.57 K/UL (ref 2–7.15)
NEUTROPHILS NFR BLD: 64.8 % (ref 44–72)
NRBC # BLD AUTO: 0 K/UL
NRBC BLD-RTO: 0 /100 WBC
PLATELET # BLD AUTO: 149 K/UL (ref 164–446)
PMV BLD AUTO: 9.9 FL (ref 9–12.9)
POTASSIUM SERPL-SCNC: 4.1 MMOL/L (ref 3.6–5.5)
RBC # BLD AUTO: 3.8 M/UL (ref 4.2–5.4)
SODIUM SERPL-SCNC: 142 MMOL/L (ref 135–145)
WBC # BLD AUTO: 7 K/UL (ref 4.8–10.8)

## 2019-02-09 PROCEDURE — 80048 BASIC METABOLIC PNL TOTAL CA: CPT

## 2019-02-09 PROCEDURE — 99285 EMERGENCY DEPT VISIT HI MDM: CPT

## 2019-02-09 PROCEDURE — 700105 HCHG RX REV CODE 258

## 2019-02-09 PROCEDURE — A9270 NON-COVERED ITEM OR SERVICE: HCPCS | Performed by: INTERNAL MEDICINE

## 2019-02-09 PROCEDURE — 72192 CT PELVIS W/O DYE: CPT

## 2019-02-09 PROCEDURE — 700102 HCHG RX REV CODE 250 W/ 637 OVERRIDE(OP): Performed by: INTERNAL MEDICINE

## 2019-02-09 PROCEDURE — 85025 COMPLETE CBC W/AUTO DIFF WBC: CPT

## 2019-02-09 PROCEDURE — 99223 1ST HOSP IP/OBS HIGH 75: CPT | Mod: AI | Performed by: INTERNAL MEDICINE

## 2019-02-09 PROCEDURE — 770001 HCHG ROOM/CARE - MED/SURG/GYN PRIV*

## 2019-02-09 PROCEDURE — 770006 HCHG ROOM/CARE - MED/SURG/GYN SEMI*

## 2019-02-09 PROCEDURE — 73502 X-RAY EXAM HIP UNI 2-3 VIEWS: CPT | Mod: RT

## 2019-02-09 PROCEDURE — 700105 HCHG RX REV CODE 258: Performed by: INTERNAL MEDICINE

## 2019-02-09 PROCEDURE — 87086 URINE CULTURE/COLONY COUNT: CPT

## 2019-02-09 PROCEDURE — 700111 HCHG RX REV CODE 636 W/ 250 OVERRIDE (IP): Performed by: INTERNAL MEDICINE

## 2019-02-09 PROCEDURE — 36415 COLL VENOUS BLD VENIPUNCTURE: CPT

## 2019-02-09 RX ORDER — POLYETHYLENE GLYCOL 3350 17 G/17G
1 POWDER, FOR SOLUTION ORAL
Status: DISCONTINUED | OUTPATIENT
Start: 2019-02-09 | End: 2019-02-13 | Stop reason: HOSPADM

## 2019-02-09 RX ORDER — SODIUM CHLORIDE 9 MG/ML
INJECTION, SOLUTION INTRAVENOUS
Status: COMPLETED
Start: 2019-02-09 | End: 2019-02-09

## 2019-02-09 RX ORDER — ACETAMINOPHEN 325 MG/1
650 TABLET ORAL EVERY 6 HOURS PRN
Status: DISCONTINUED | OUTPATIENT
Start: 2019-02-09 | End: 2019-02-13 | Stop reason: HOSPADM

## 2019-02-09 RX ORDER — OMEPRAZOLE 20 MG/1
20 CAPSULE, DELAYED RELEASE ORAL 2 TIMES DAILY
Status: DISCONTINUED | OUTPATIENT
Start: 2019-02-09 | End: 2019-02-09

## 2019-02-09 RX ORDER — ONDANSETRON 4 MG/1
4 TABLET, ORALLY DISINTEGRATING ORAL EVERY 4 HOURS PRN
Status: DISCONTINUED | OUTPATIENT
Start: 2019-02-09 | End: 2019-02-13 | Stop reason: HOSPADM

## 2019-02-09 RX ORDER — SUCRALFATE ORAL 1 G/10ML
1 SUSPENSION ORAL
Status: DISCONTINUED | OUTPATIENT
Start: 2019-02-09 | End: 2019-02-09

## 2019-02-09 RX ORDER — THIAMINE MONONITRATE (VIT B1) 100 MG
100 TABLET ORAL DAILY
Status: DISCONTINUED | OUTPATIENT
Start: 2019-02-09 | End: 2019-02-09

## 2019-02-09 RX ORDER — METOPROLOL TARTRATE 50 MG/1
50 TABLET, FILM COATED ORAL 2 TIMES DAILY
Status: ON HOLD | COMMUNITY
End: 2020-02-11

## 2019-02-09 RX ORDER — OXYCODONE HYDROCHLORIDE 5 MG/1
5 TABLET ORAL
Status: DISCONTINUED | OUTPATIENT
Start: 2019-02-09 | End: 2019-02-13 | Stop reason: HOSPADM

## 2019-02-09 RX ORDER — HYDROMORPHONE HYDROCHLORIDE 1 MG/ML
0.25 INJECTION, SOLUTION INTRAMUSCULAR; INTRAVENOUS; SUBCUTANEOUS
Status: DISCONTINUED | OUTPATIENT
Start: 2019-02-09 | End: 2019-02-13 | Stop reason: HOSPADM

## 2019-02-09 RX ORDER — LEVOTHYROXINE SODIUM 88 UG/1
88 TABLET ORAL DAILY
Status: DISCONTINUED | OUTPATIENT
Start: 2019-02-09 | End: 2019-02-09

## 2019-02-09 RX ORDER — OXYCODONE HYDROCHLORIDE 5 MG/1
2.5 TABLET ORAL
Status: DISCONTINUED | OUTPATIENT
Start: 2019-02-09 | End: 2019-02-13 | Stop reason: HOSPADM

## 2019-02-09 RX ORDER — AMLODIPINE BESYLATE 5 MG/1
5 TABLET ORAL DAILY
Status: DISCONTINUED | OUTPATIENT
Start: 2019-02-09 | End: 2019-02-09

## 2019-02-09 RX ORDER — LEVOTHYROXINE SODIUM 88 UG/1
88 TABLET ORAL
Status: DISCONTINUED | OUTPATIENT
Start: 2019-02-10 | End: 2019-02-11

## 2019-02-09 RX ORDER — FOLIC ACID 1 MG/1
1 TABLET ORAL DAILY
Status: DISCONTINUED | OUTPATIENT
Start: 2019-02-09 | End: 2019-02-09

## 2019-02-09 RX ORDER — HYDRALAZINE HYDROCHLORIDE 10 MG/1
10 TABLET, FILM COATED ORAL EVERY 8 HOURS PRN
Status: DISCONTINUED | OUTPATIENT
Start: 2019-02-09 | End: 2019-02-13 | Stop reason: HOSPADM

## 2019-02-09 RX ORDER — BISACODYL 10 MG
10 SUPPOSITORY, RECTAL RECTAL
Status: DISCONTINUED | OUTPATIENT
Start: 2019-02-09 | End: 2019-02-13 | Stop reason: HOSPADM

## 2019-02-09 RX ORDER — HYDRALAZINE HYDROCHLORIDE 10 MG/1
10 TABLET, FILM COATED ORAL EVERY 8 HOURS
Status: DISCONTINUED | OUTPATIENT
Start: 2019-02-09 | End: 2019-02-13 | Stop reason: HOSPADM

## 2019-02-09 RX ORDER — LEVOTHYROXINE SODIUM 88 UG/1
88 TABLET ORAL
COMMUNITY

## 2019-02-09 RX ORDER — ONDANSETRON 2 MG/ML
4 INJECTION INTRAMUSCULAR; INTRAVENOUS EVERY 4 HOURS PRN
Status: DISCONTINUED | OUTPATIENT
Start: 2019-02-09 | End: 2019-02-13 | Stop reason: HOSPADM

## 2019-02-09 RX ORDER — METOPROLOL TARTRATE 50 MG/1
50 TABLET, FILM COATED ORAL 2 TIMES DAILY
Status: DISCONTINUED | OUTPATIENT
Start: 2019-02-09 | End: 2019-02-13 | Stop reason: HOSPADM

## 2019-02-09 RX ORDER — AMOXICILLIN 250 MG
2 CAPSULE ORAL 2 TIMES DAILY
Status: DISCONTINUED | OUTPATIENT
Start: 2019-02-10 | End: 2019-02-13 | Stop reason: HOSPADM

## 2019-02-09 RX ADMIN — OXYCODONE HYDROCHLORIDE 5 MG: 5 TABLET ORAL at 20:34

## 2019-02-09 RX ADMIN — CEFTRIAXONE SODIUM 1 G: 1 INJECTION, POWDER, FOR SOLUTION INTRAMUSCULAR; INTRAVENOUS at 21:06

## 2019-02-09 RX ADMIN — SODIUM CHLORIDE 1000 ML: 9 INJECTION, SOLUTION INTRAVENOUS at 21:07

## 2019-02-09 RX ADMIN — METOPROLOL TARTRATE 50 MG: 50 TABLET ORAL at 19:25

## 2019-02-09 RX ADMIN — HYDRALAZINE HYDROCHLORIDE 10 MG: 10 TABLET ORAL at 19:26

## 2019-02-09 ASSESSMENT — ENCOUNTER SYMPTOMS
WHEEZING: 0
CHILLS: 0
SHORTNESS OF BREATH: 0
PALPITATIONS: 0
EYE PAIN: 0
FALLS: 0
SPEECH CHANGE: 0
HEADACHES: 0
SPUTUM PRODUCTION: 0
TREMORS: 0
DIARRHEA: 0
HEARTBURN: 0
WEAKNESS: 0
SEIZURES: 0
COUGH: 0
WEIGHT LOSS: 0
ABDOMINAL PAIN: 0
DIZZINESS: 0
NECK PAIN: 0
NAUSEA: 0
VOMITING: 0
BACK PAIN: 0
CONSTIPATION: 0
BLURRED VISION: 0
FEVER: 0
DEPRESSION: 0
PND: 0

## 2019-02-09 ASSESSMENT — LIFESTYLE VARIABLES: SUBSTANCE_ABUSE: 0
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
## 2019-02-09 NOTE — ED NOTES
Chief Complaint   Patient presents with   • Hip Pain     Right hip pain from a fall 3 days ago. pt was ambulatory on scene

## 2019-02-10 PROBLEM — D75.89 MACROCYTOSIS: Status: ACTIVE | Noted: 2019-02-10

## 2019-02-10 LAB
ANION GAP SERPL CALC-SCNC: 11 MMOL/L (ref 0–11.9)
APPEARANCE UR: ABNORMAL
BACTERIA #/AREA URNS HPF: ABNORMAL /HPF
BASOPHILS # BLD AUTO: 0.9 % (ref 0–1.8)
BASOPHILS # BLD: 0.06 K/UL (ref 0–0.12)
BILIRUB UR QL STRIP.AUTO: NEGATIVE
BUN SERPL-MCNC: 21 MG/DL (ref 8–22)
CALCIUM SERPL-MCNC: 9.6 MG/DL (ref 8.5–10.5)
CHLORIDE SERPL-SCNC: 104 MMOL/L (ref 96–112)
CO2 SERPL-SCNC: 23 MMOL/L (ref 20–33)
COLOR UR: YELLOW
CREAT SERPL-MCNC: 0.9 MG/DL (ref 0.5–1.4)
EOSINOPHIL # BLD AUTO: 0.36 K/UL (ref 0–0.51)
EOSINOPHIL NFR BLD: 5.5 % (ref 0–6.9)
EPI CELLS #/AREA URNS HPF: ABNORMAL /HPF
ERYTHROCYTE [DISTWIDTH] IN BLOOD BY AUTOMATED COUNT: 49.1 FL (ref 35.9–50)
GLUCOSE SERPL-MCNC: 99 MG/DL (ref 65–99)
GLUCOSE UR STRIP.AUTO-MCNC: NEGATIVE MG/DL
HCT VFR BLD AUTO: 45 % (ref 37–47)
HGB BLD-MCNC: 14.8 G/DL (ref 12–16)
IMM GRANULOCYTES # BLD AUTO: 0.03 K/UL (ref 0–0.11)
IMM GRANULOCYTES NFR BLD AUTO: 0.5 % (ref 0–0.9)
KETONES UR STRIP.AUTO-MCNC: NEGATIVE MG/DL
LEUKOCYTE ESTERASE UR QL STRIP.AUTO: ABNORMAL
LYMPHOCYTES # BLD AUTO: 1.88 K/UL (ref 1–4.8)
LYMPHOCYTES NFR BLD: 28.9 % (ref 22–41)
MCH RBC QN AUTO: 34.8 PG (ref 27–33)
MCHC RBC AUTO-ENTMCNC: 32.9 G/DL (ref 33.6–35)
MCV RBC AUTO: 105.9 FL (ref 81.4–97.8)
MICRO URNS: ABNORMAL
MONOCYTES # BLD AUTO: 0.54 K/UL (ref 0–0.85)
MONOCYTES NFR BLD AUTO: 8.3 % (ref 0–13.4)
NEUTROPHILS # BLD AUTO: 3.63 K/UL (ref 2–7.15)
NEUTROPHILS NFR BLD: 55.9 % (ref 44–72)
NITRITE UR QL STRIP.AUTO: POSITIVE
NRBC # BLD AUTO: 0 K/UL
NRBC BLD-RTO: 0 /100 WBC
PH UR STRIP.AUTO: 5.5 [PH]
PLATELET # BLD AUTO: 170 K/UL (ref 164–446)
PMV BLD AUTO: 10.3 FL (ref 9–12.9)
POTASSIUM SERPL-SCNC: 3.2 MMOL/L (ref 3.6–5.5)
PROT UR QL STRIP: >=1000 MG/DL
RBC # BLD AUTO: 4.25 M/UL (ref 4.2–5.4)
RBC # URNS HPF: ABNORMAL /HPF
RBC UR QL AUTO: ABNORMAL
SODIUM SERPL-SCNC: 138 MMOL/L (ref 135–145)
SP GR UR STRIP.AUTO: 1.02
UROBILINOGEN UR STRIP.AUTO-MCNC: 0.2 MG/DL
WBC # BLD AUTO: 6.5 K/UL (ref 4.8–10.8)
WBC #/AREA URNS HPF: ABNORMAL /HPF

## 2019-02-10 PROCEDURE — 99233 SBSQ HOSP IP/OBS HIGH 50: CPT | Performed by: INTERNAL MEDICINE

## 2019-02-10 PROCEDURE — 700102 HCHG RX REV CODE 250 W/ 637 OVERRIDE(OP): Performed by: INTERNAL MEDICINE

## 2019-02-10 PROCEDURE — 700105 HCHG RX REV CODE 258: Performed by: INTERNAL MEDICINE

## 2019-02-10 PROCEDURE — 700111 HCHG RX REV CODE 636 W/ 250 OVERRIDE (IP): Performed by: INTERNAL MEDICINE

## 2019-02-10 PROCEDURE — 85025 COMPLETE CBC W/AUTO DIFF WBC: CPT

## 2019-02-10 PROCEDURE — 770001 HCHG ROOM/CARE - MED/SURG/GYN PRIV*

## 2019-02-10 PROCEDURE — A9270 NON-COVERED ITEM OR SERVICE: HCPCS | Performed by: INTERNAL MEDICINE

## 2019-02-10 PROCEDURE — 770006 HCHG ROOM/CARE - MED/SURG/GYN SEMI*

## 2019-02-10 PROCEDURE — 97165 OT EVAL LOW COMPLEX 30 MIN: CPT

## 2019-02-10 PROCEDURE — 97162 PT EVAL MOD COMPLEX 30 MIN: CPT

## 2019-02-10 PROCEDURE — 81001 URINALYSIS AUTO W/SCOPE: CPT

## 2019-02-10 PROCEDURE — 80048 BASIC METABOLIC PNL TOTAL CA: CPT

## 2019-02-10 PROCEDURE — 36415 COLL VENOUS BLD VENIPUNCTURE: CPT

## 2019-02-10 RX ORDER — POTASSIUM CHLORIDE 20 MEQ/1
40 TABLET, EXTENDED RELEASE ORAL 2 TIMES DAILY
Status: COMPLETED | OUTPATIENT
Start: 2019-02-10 | End: 2019-02-11

## 2019-02-10 RX ORDER — POTASSIUM CHLORIDE 20 MEQ/1
40 TABLET, EXTENDED RELEASE ORAL ONCE
Status: DISCONTINUED | OUTPATIENT
Start: 2019-02-10 | End: 2019-02-10

## 2019-02-10 RX ORDER — HALOPERIDOL 5 MG/ML
1 INJECTION INTRAMUSCULAR EVERY 6 HOURS PRN
Status: COMPLETED | OUTPATIENT
Start: 2019-02-10 | End: 2019-02-10

## 2019-02-10 RX ADMIN — METOPROLOL TARTRATE 50 MG: 50 TABLET ORAL at 04:38

## 2019-02-10 RX ADMIN — HYDRALAZINE HYDROCHLORIDE 10 MG: 10 TABLET, FILM COATED ORAL at 04:38

## 2019-02-10 RX ADMIN — SENNOSIDES AND DOCUSATE SODIUM 2 TABLET: 8.6; 5 TABLET ORAL at 04:38

## 2019-02-10 RX ADMIN — ACETAMINOPHEN 650 MG: 325 TABLET, FILM COATED ORAL at 14:26

## 2019-02-10 RX ADMIN — HYDRALAZINE HYDROCHLORIDE 10 MG: 10 TABLET ORAL at 17:13

## 2019-02-10 RX ADMIN — HYDRALAZINE HYDROCHLORIDE 10 MG: 10 TABLET ORAL at 21:11

## 2019-02-10 RX ADMIN — CEFTRIAXONE SODIUM 1 G: 1 INJECTION, POWDER, FOR SOLUTION INTRAMUSCULAR; INTRAVENOUS at 17:44

## 2019-02-10 RX ADMIN — LEVOTHYROXINE SODIUM 88 MCG: 88 TABLET ORAL at 04:38

## 2019-02-10 RX ADMIN — OXYCODONE HYDROCHLORIDE 5 MG: 5 TABLET ORAL at 04:39

## 2019-02-10 RX ADMIN — OXYCODONE HYDROCHLORIDE 5 MG: 5 TABLET ORAL at 10:56

## 2019-02-10 RX ADMIN — HALOPERIDOL LACTATE 1 MG: 5 INJECTION, SOLUTION INTRAMUSCULAR at 23:20

## 2019-02-10 RX ADMIN — SENNOSIDES AND DOCUSATE SODIUM 2 TABLET: 8.6; 5 TABLET ORAL at 17:13

## 2019-02-10 RX ADMIN — POTASSIUM CHLORIDE 40 MEQ: 1500 TABLET, EXTENDED RELEASE ORAL at 17:13

## 2019-02-10 RX ADMIN — OXYCODONE HYDROCHLORIDE 5 MG: 5 TABLET ORAL at 23:03

## 2019-02-10 RX ADMIN — METOPROLOL TARTRATE 50 MG: 50 TABLET ORAL at 17:14

## 2019-02-10 ASSESSMENT — COGNITIVE AND FUNCTIONAL STATUS - GENERAL
SUGGESTED CMS G CODE MODIFIER MOBILITY: CL
MOBILITY SCORE: 16
SUGGESTED CMS G CODE MODIFIER DAILY ACTIVITY: CJ
TURNING FROM BACK TO SIDE WHILE IN FLAT BAD: A LOT
TOILETING: TOTAL
MOVING TO AND FROM BED TO CHAIR: A LOT
CLIMB 3 TO 5 STEPS WITH RAILING: A LOT
SUGGESTED CMS G CODE MODIFIER DAILY ACTIVITY: CL
PERSONAL GROOMING: A LITTLE
WALKING IN HOSPITAL ROOM: A LOT
MOVING TO AND FROM BED TO CHAIR: A LITTLE
HELP NEEDED FOR BATHING: TOTAL
STANDING UP FROM CHAIR USING ARMS: A LOT
MOVING FROM LYING ON BACK TO SITTING ON SIDE OF FLAT BED: A LITTLE
DRESSING REGULAR LOWER BODY CLOTHING: TOTAL
DRESSING REGULAR LOWER BODY CLOTHING: A LOT
DAILY ACTIVITIY SCORE: 12
TOILETING: A LOT
MOVING FROM LYING ON BACK TO SITTING ON SIDE OF FLAT BED: A LITTLE
SUGGESTED CMS G CODE MODIFIER MOBILITY: CK
MOBILITY SCORE: 13
DRESSING REGULAR UPPER BODY CLOTHING: A LOT
TURNING FROM BACK TO SIDE WHILE IN FLAT BAD: A LITTLE
WALKING IN HOSPITAL ROOM: A LITTLE
CLIMB 3 TO 5 STEPS WITH RAILING: A LOT
STANDING UP FROM CHAIR USING ARMS: A LOT
DAILY ACTIVITIY SCORE: 20

## 2019-02-10 ASSESSMENT — ENCOUNTER SYMPTOMS
ORTHOPNEA: 0
MYALGIAS: 1
DIARRHEA: 0
WEIGHT LOSS: 0
SPUTUM PRODUCTION: 0
HEADACHES: 0
SHORTNESS OF BREATH: 0
COUGH: 0
VOMITING: 0
FOCAL WEAKNESS: 0
TREMORS: 0
EYE PAIN: 0
NECK PAIN: 0
SPEECH CHANGE: 0
PHOTOPHOBIA: 0
ABDOMINAL PAIN: 0
FEVER: 0
CHILLS: 0
BLURRED VISION: 0
CONSTIPATION: 0
BACK PAIN: 0
NAUSEA: 0
DIZZINESS: 0
PALPITATIONS: 0
TINGLING: 0
HALLUCINATIONS: 0
SENSORY CHANGE: 0
DOUBLE VISION: 0

## 2019-02-10 ASSESSMENT — PATIENT HEALTH QUESTIONNAIRE - PHQ9
3. TROUBLE FALLING OR STAYING ASLEEP OR SLEEPING TOO MUCH: NOT AT ALL
6. FEELING BAD ABOUT YOURSELF - OR THAT YOU ARE A FAILURE OR HAVE LET YOURSELF OR YOUR FAMILY DOWN: NOT AL ALL
2. FEELING DOWN, DEPRESSED, IRRITABLE, OR HOPELESS: NOT AT ALL
4. FEELING TIRED OR HAVING LITTLE ENERGY: NOT AT ALL
SUM OF ALL RESPONSES TO PHQ9 QUESTIONS 1 AND 2: 1
7. TROUBLE CONCENTRATING ON THINGS, SUCH AS READING THE NEWSPAPER OR WATCHING TELEVISION: NOT AT ALL
8. MOVING OR SPEAKING SO SLOWLY THAT OTHER PEOPLE COULD HAVE NOTICED. OR THE OPPOSITE, BEING SO FIGETY OR RESTLESS THAT YOU HAVE BEEN MOVING AROUND A LOT MORE THAN USUAL: NOT AT ALL
1. LITTLE INTEREST OR PLEASURE IN DOING THINGS: SEVERAL DAYS
5. POOR APPETITE OR OVEREATING: NOT AT ALL
SUM OF ALL RESPONSES TO PHQ QUESTIONS 1-9: 1
9. THOUGHTS THAT YOU WOULD BE BETTER OFF DEAD, OR OF HURTING YOURSELF: NOT AT ALL

## 2019-02-10 ASSESSMENT — GAIT ASSESSMENTS
DEVIATION: ANTALGIC;STEP TO
ASSISTIVE DEVICE: FRONT WHEEL WALKER
DISTANCE (FEET): 2
GAIT LEVEL OF ASSIST: STAND BY ASSIST

## 2019-02-10 ASSESSMENT — LIFESTYLE VARIABLES
AVERAGE NUMBER OF DAYS PER WEEK YOU HAVE A DRINK CONTAINING ALCOHOL: 2
CONSUMPTION TOTAL: NEGATIVE
EVER_SMOKED: YES
HOW MANY TIMES IN THE PAST YEAR HAVE YOU HAD 5 OR MORE DRINKS IN A DAY: 0
ON A TYPICAL DAY WHEN YOU DRINK ALCOHOL HOW MANY DRINKS DO YOU HAVE: 1
EVER FELT BAD OR GUILTY ABOUT YOUR DRINKING: NO
SUBSTANCE_ABUSE: 0
EVER HAD A DRINK FIRST THING IN THE MORNING TO STEADY YOUR NERVES TO GET RID OF A HANGOVER: NO
TOTAL SCORE: 0
HAVE YOU EVER FELT YOU SHOULD CUT DOWN ON YOUR DRINKING: NO
TOTAL SCORE: 0
ALCOHOL_USE: YES
TOTAL SCORE: 0
HAVE PEOPLE ANNOYED YOU BY CRITICIZING YOUR DRINKING: NO

## 2019-02-10 ASSESSMENT — ACTIVITIES OF DAILY LIVING (ADL): TOILETING: INDEPENDENT

## 2019-02-10 NOTE — THERAPY
"Physical Therapy Evaluation completed.   Bed Mobility:  Supine to Sit: Moderate Assist  Transfers: Sit to Stand: Minimal Assist  Gait: Level Of Assist: Stand by Assist with Front-Wheel Walker     2 ft  Plan of Care: Will benefit from Physical Therapy 3 times per week  Discharge Recommendations: Equipment: Will Continue to Assess for Equipment Needs. Post-acute therapy  Recommend inpatient transitional care services for continued physical therapy services.    See \"Rehab Therapy-Acute\" Patient Summary Report for complete documentation.     "

## 2019-02-10 NOTE — ASSESSMENT & PLAN NOTE
Her CT scan pelvis showed extensive emphysematous change of the wall of the urinary bladder with a small amount of gas seen extending into the perivesicular space.  Urine culture pending  Continue antibiotic for now.

## 2019-02-10 NOTE — RESPIRATORY CARE
COPD EDUCATION by COPD CLINICAL EDUCATOR  2/10/2019 at 6:56 AM by Enma Reardon     Patient reviewed by COPD education team. Patient does not qualify for COPD program.

## 2019-02-10 NOTE — ASSESSMENT & PLAN NOTE
She presented with after fall.  She denies losing consciousness on presentation.  CT scan showed mildly displaced fracture of the right superior and inferior pubic rami.  Orthopedic surgery evaluated her and recommended conservative management and follow-up as outpatient.  Continue pain control  Physical therapy

## 2019-02-10 NOTE — ED NOTES
Med rec complete per pt at bedside with RX bottles (returned to family)  Allergies reviewed  No oral ABX within last 30 days
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
## 2019-02-10 NOTE — PROGRESS NOTES
· 2 RN skin check complete with MUMTAZ Ramirez.  · Skin assessed under devices SCDs.  · Both feet have blanchable redness and are flaky  · The following interventions in place: pillows in use for support and positioning, encouraged Q2H turns, waffle cushion ordered to put on bed

## 2019-02-10 NOTE — ASSESSMENT & PLAN NOTE
Mechanical fall.  Physical therapy evaluated her and recommended transitional care facility.  I requested a skilled nursing facility referral.

## 2019-02-10 NOTE — H&P
Hospital Medicine History & Physical Note    Date of Service  2/9/2019    Primary Care Physician  Edvin BREEN M.D.    Consultants  Orthopedics Dr. Collins    Code Status  Full code    Chief Complaint  Right hip pain    History of Presenting Illness  68 y.o. female who presented 2/9/2019 with past medical history of alcohol use, hypertension essential, presented with complaint of right hip pain.  Apparently patient had a ground-level fall 2 days ago and since then patient complained of right hip pain and currently getting worse.  Patient said the pain is severe enough so she cannot walk or put any weight on that side. Patient's pain is local, 6-8/10, intermittent and does not radiate to other location, sharp and with some tingling. Can be controlled by pain meds.   Patient otherwise denies fever, chills, nausea, vomiting, adb pain, SOB, CP, headache, constipation, diarrhea, cough, or sputum.      Review of Systems  Review of Systems   Constitutional: Negative for chills, fever and weight loss.   HENT: Negative for congestion, ear discharge, ear pain, hearing loss and nosebleeds.    Eyes: Negative for blurred vision and pain.   Respiratory: Negative for cough, sputum production, shortness of breath and wheezing.    Cardiovascular: Negative for chest pain, palpitations, leg swelling and PND.   Gastrointestinal: Negative for abdominal pain, constipation, diarrhea, heartburn, nausea and vomiting.   Genitourinary: Negative for dysuria, frequency and hematuria.   Musculoskeletal: Positive for joint pain. Negative for back pain, falls and neck pain.        Patient complained of hip pain.   Skin: Negative for rash.   Neurological: Negative for dizziness, tremors, speech change, seizures, weakness and headaches.   Psychiatric/Behavioral: Negative for depression, substance abuse and suicidal ideas.       Past Medical History   has a past medical history of Arthritis; At high risk for falls; Hepatitis C; High  cholesterol; HTN (hypertension); Seasonal allergies; Thyroid disease; and Unspecified cataract. She also has no past medical history of Breast cancer (HCC); Clotting disorder (HCC); or Diabetes.    Surgical History   has a past surgical history that includes breast biopsy; thyroidectomy; hip arthroplasty total (2007); cataract phaco with iol (6/25/2014); cataract phaco with iol (7/9/2014); gastroscopy-endo (3/1/2017); gastroscopy with sclerotherapy (3/1/2017); gastroscopy (N/A, 3/6/2017); and gastroscopy (N/A, 9/22/2018).     Family History  Family history reviewed , felt nonpertinent to this encounter.       Social History   reports that she drinks alcohol. She reports that she does not use drugs.  Patient said she has already quit smoking.    Allergies  Allergies   Allergen Reactions   • Codeine Rash   • Codeine        Medications  Prior to Admission Medications   Prescriptions Last Dose Informant Patient Reported? Taking?   acetaminophen (TYLENOL) 325 MG Tab   No No   Sig: Take 2 Tabs by mouth every 6 hours as needed.   amLODIPine (NORVASC) 5 MG Tab   No No   Sig: Take 1 Tab by mouth every day.   folic acid (FOLVITE) 1 MG Tab   No No   Sig: Take 1 Tab by mouth every day.   levothyroxine (SYNTHROID) 112 MCG TABS  Patient No No   Sig: Take 1 Tab by mouth every day.   multivitamin (THERAGRAN) Tab   No No   Sig: Take 1 Tab by mouth every day.   omeprazole (PRILOSEC) 20 MG delayed-release capsule   No No   Sig: Take 1 Cap by mouth 2 Times a Day.   sucralfate (CARAFATE) 1 GM/10ML Suspension   No No   Sig: Take 10 mL by mouth 4 Times a Day,Before Meals and at Bedtime.   thiamine (THIAMINE) 100 MG tablet   No No   Sig: Take 1 Tab by mouth every day.      Facility-Administered Medications: None       Physical Exam  Temp:  [36.4 °C (97.5 °F)] 36.4 °C (97.5 °F)  Pulse:  [] 97  Resp:  [16-18] 18  BP: (174-180)/() 180/92  SpO2:  [94 %-98 %] 96 %    Physical Exam   Constitutional: She is oriented to person, place,  and time. She appears well-developed and well-nourished.   HENT:   Head: Normocephalic.   Nose: Nose normal.   Mouth/Throat: No oropharyngeal exudate.   Eyes: Pupils are equal, round, and reactive to light. EOM are normal.   Neck: Normal range of motion. Neck supple. No JVD present. No thyromegaly present.   Cardiovascular: Normal rate, regular rhythm and normal heart sounds.  Exam reveals no gallop and no friction rub.    No murmur heard.  Pulmonary/Chest: Effort normal and breath sounds normal. No respiratory distress. She has no wheezes. She has no rales.   Abdominal: Soft. Bowel sounds are normal. She exhibits no distension and no mass. There is no tenderness. There is no rebound and no guarding.   Musculoskeletal: She exhibits tenderness. She exhibits no edema.   Decreased range of motion of the hip.   Lymphadenopathy:     She has no cervical adenopathy.   Neurological: She is alert and oriented to person, place, and time. No cranial nerve deficit.   Skin: Skin is warm. No rash noted.   Psychiatric: Her behavior is normal.       Laboratory:  Recent Labs      02/09/19   1410   WBC  7.0   RBC  3.80*   HEMOGLOBIN  13.6   HEMATOCRIT  40.9   MCV  107.6*   MCH  35.8*   MCHC  33.3*   RDW  50.3*   PLATELETCT  149*   MPV  9.9     Recent Labs      02/09/19   1410   SODIUM  142   POTASSIUM  4.1   CHLORIDE  108   CO2  20   GLUCOSE  105*   BUN  27*   CREATININE  1.06   CALCIUM  9.9     Recent Labs      02/09/19   1410   GLUCOSE  105*                 No results for input(s): TROPONINI in the last 72 hours.    Urinalysis:    No results found     Imaging:  CT-PELVIS W/O PLUS RECONS   Final Result      1.  Mildly displaced fractures of the right superior and inferior pubic rami.      2.  Extensive emphysematous change of the wall of the urinary bladder with a small amount of gas seen extending into the prevesical space. There is no evidence of surrounding fluid collection. This likely process would be that of emphysematous  cystitis.    There is no evidence of surrounding hematoma or fluid collection to suggest presence of bladder injury.      DX-HIP-COMPLETE - UNILATERAL 2+ RIGHT   Final Result      1.  Possible right inferior pubic ramus fracture.      2.  No evidence of right proximal femoral fracture.      3.  Prior left hip arthroplasty.      4.  Gas seen surrounding the urinary bladder. This can be seen with emphysematous cystitis as well as with instrumentation or recent surgery.            Assessment/Plan:  I anticipate this patient will require at least two midnights for appropriate medical management, necessitating inpatient admission.    * Closed fracture of pelvis (HCC)- (present on admission)   Assessment & Plan    S/p fall  Patient denies loss of consciousness, likely mechanical fall.  CT confirmed mild closed pelvic fracture.  I consulted orthopedics.  I provide pain control.  Ordered IV fluid rehydration.     Fall- (present on admission)   Assessment & Plan    Mechanical fall, likely due to deconditioning versus alcohol use.  I ordered a PT OT.     Acute cystitis without hematuria- (present on admission)   Assessment & Plan    Patient CT concern of emphysematous acute cystitis.  Patient refused to give urine sample at this moment.  Patient also refused Piedra catheter.  I ordered the patient IV Rocephin.  Close monitor.  Patient currently does not have signs of sepsis.     Former tobacco use- (present on admission)   Assessment & Plan    Smoking cessation education provided.    I spent 4 minutes on tobacco cessation counseling including nicotine patches, gum, and dangers of smoking. Also discussed options of nicotine patch, medical treatment with wellbutrin and chantix. Discussed the risks of smoking including increased risk of heart disease, stroke, cancer and COPD. Discussed the benefits of quitting smoking. Nicotine replacement protocol provided to the patient.    27307 (smoking and tobacco cessation counseling visit;  3-10 min)         Alcohol use- (present on admission)   Assessment & Plan    Alcohol cessation education provided.         VTE prophylaxis: Lovenox      Current Facility-Administered Medications:   •  sucralfate (CARAFATE) 1 GM/10ML suspension 1 g, 1 g, Oral, 4X/DAY ACHS, Sheridan Yeung M.D.  •  omeprazole (PRILOSEC) capsule 20 mg, 20 mg, Oral, BID, Sheridan Yeung M.D.  •  multivitamin (THERAGRAN) tablet 1 Tab, 1 Tab, Oral, DAILY, Sheridan Yeung M.D.  •  levothyroxine (SYNTHROID) tablet 112 mcg, 112 mcg, Oral, DAILY, Sheridan Yeung M.D.  •  folic acid (FOLVITE) tablet 1 mg, 1 mg, Oral, DAILY, Sheridan Yeung M.D.  •  amLODIPine (NORVASC) tablet 5 mg, 5 mg, Oral, DAILY, Sheridan Yeung M.D.  •  thiamine tablet 100 mg, 100 mg, Oral, DAILY, Sheridan Yeung M.D.  •  [START ON 2/10/2019] senna-docusate (PERICOLACE or SENOKOT S) 8.6-50 MG per tablet 2 Tab, 2 Tab, Oral, BID **AND** polyethylene glycol/lytes (MIRALAX) PACKET 1 Packet, 1 Packet, Oral, QDAY PRN **AND** magnesium hydroxide (MILK OF MAGNESIA) suspension 30 mL, 30 mL, Oral, QDAY PRN **AND** bisacodyl (DULCOLAX) suppository 10 mg, 10 mg, Rectal, QDAY PRN, Sheridan Yeung M.D.  •  acetaminophen (TYLENOL) tablet 650 mg, 650 mg, Oral, Q6HRS PRN, Sheridan Yeung M.D.  •  [START ON 2/10/2019] enoxaparin (LOVENOX) inj 40 mg, 40 mg, Subcutaneous, DAILY, Shreidan Yeung M.D.  •  ondansetron (ZOFRAN) syringe/vial injection 4 mg, 4 mg, Intravenous, Q4HRS PRN, Sheridan Yeung M.D.  •  ondansetron (ZOFRAN ODT) dispertab 4 mg, 4 mg, Oral, Q4HRS PRN, Sheridan Yeung M.D.  •  Notify provider if pain remains uncontrolled, , , CONTINUOUS **AND** Use the numeric rating scale (NRS-11) on regular floors and Critical-Care Pain Observation Tool (CPOT) on ICUs/Trauma to assess pain, , , CONTINUOUS **AND** Pulse Ox (Oximetry), , , CONTINUOUS **AND** Pharmacy Consult Request ...Pain Management Review 1 Each, 1 Each, Other, PHARMACY TO DOSE **AND** If patient difficult to arouse and/or has respiratory depression, stop any opiates that are currently  infusing and call a Rapid Response., , , CONTINUOUS **AND** oxyCODONE immediate-release (ROXICODONE) tablet 2.5 mg, 2.5 mg, Oral, Q3HRS PRN **AND** oxyCODONE immediate-release (ROXICODONE) tablet 5 mg, 5 mg, Oral, Q3HRS PRN **AND** HYDROmorphone pf (DILAUDID) injection 0.25 mg, 0.25 mg, Intravenous, Q3HRS PRN, Sheridan Yeung M.D.  •  [START ON 2/10/2019] cefTRIAXone (ROCEPHIN) 1 g in  mL IVPB, 1 g, Intravenous, Q24HRS, Sheridan Yeung M.D.

## 2019-02-10 NOTE — CARE PLAN
Problem: Communication  Goal: The ability to communicate needs accurately and effectively will improve  Outcome: MET Date Met: 02/10/19  Patient uses call light as necessary to contact medical staff    Problem: Knowledge Deficit  Goal: Knowledge of disease process/condition, treatment plan, diagnostic tests, and medications will improve  Outcome: MET Date Met: 02/10/19

## 2019-02-11 PROBLEM — G93.40 ACUTE ENCEPHALOPATHY: Status: ACTIVE | Noted: 2019-02-11

## 2019-02-11 LAB
ANION GAP SERPL CALC-SCNC: 11 MMOL/L (ref 0–11.9)
BUN SERPL-MCNC: 19 MG/DL (ref 8–22)
CALCIUM SERPL-MCNC: 9.1 MG/DL (ref 8.5–10.5)
CHLORIDE SERPL-SCNC: 103 MMOL/L (ref 96–112)
CO2 SERPL-SCNC: 20 MMOL/L (ref 20–33)
CREAT SERPL-MCNC: 0.79 MG/DL (ref 0.5–1.4)
FOLATE SERPL-MCNC: 12.2 NG/ML
GLUCOSE SERPL-MCNC: 124 MG/DL (ref 65–99)
POTASSIUM SERPL-SCNC: 3.2 MMOL/L (ref 3.6–5.5)
SODIUM SERPL-SCNC: 134 MMOL/L (ref 135–145)
TSH SERPL DL<=0.005 MIU/L-ACNC: 31.89 UIU/ML (ref 0.38–5.33)
VIT B12 SERPL-MCNC: 257 PG/ML (ref 211–911)

## 2019-02-11 PROCEDURE — 700105 HCHG RX REV CODE 258: Performed by: INTERNAL MEDICINE

## 2019-02-11 PROCEDURE — 770006 HCHG ROOM/CARE - MED/SURG/GYN SEMI*

## 2019-02-11 PROCEDURE — 84443 ASSAY THYROID STIM HORMONE: CPT

## 2019-02-11 PROCEDURE — 82607 VITAMIN B-12: CPT

## 2019-02-11 PROCEDURE — 770001 HCHG ROOM/CARE - MED/SURG/GYN PRIV*

## 2019-02-11 PROCEDURE — 80048 BASIC METABOLIC PNL TOTAL CA: CPT

## 2019-02-11 PROCEDURE — 700102 HCHG RX REV CODE 250 W/ 637 OVERRIDE(OP): Performed by: INTERNAL MEDICINE

## 2019-02-11 PROCEDURE — 82746 ASSAY OF FOLIC ACID SERUM: CPT

## 2019-02-11 PROCEDURE — A9270 NON-COVERED ITEM OR SERVICE: HCPCS | Performed by: INTERNAL MEDICINE

## 2019-02-11 PROCEDURE — 99233 SBSQ HOSP IP/OBS HIGH 50: CPT | Performed by: INTERNAL MEDICINE

## 2019-02-11 PROCEDURE — 36415 COLL VENOUS BLD VENIPUNCTURE: CPT

## 2019-02-11 PROCEDURE — 700111 HCHG RX REV CODE 636 W/ 250 OVERRIDE (IP): Performed by: INTERNAL MEDICINE

## 2019-02-11 RX ORDER — LEVOTHYROXINE SODIUM 0.1 MG/1
100 TABLET ORAL
Status: DISCONTINUED | OUTPATIENT
Start: 2019-02-12 | End: 2019-02-13 | Stop reason: HOSPADM

## 2019-02-11 RX ORDER — POTASSIUM CHLORIDE 20 MEQ/1
40 TABLET, EXTENDED RELEASE ORAL 2 TIMES DAILY
Status: COMPLETED | OUTPATIENT
Start: 2019-02-11 | End: 2019-02-11

## 2019-02-11 RX ORDER — LISINOPRIL 2.5 MG/1
5 TABLET ORAL
Status: DISCONTINUED | OUTPATIENT
Start: 2019-02-11 | End: 2019-02-13 | Stop reason: HOSPADM

## 2019-02-11 RX ORDER — POTASSIUM CHLORIDE 20 MEQ/1
40 TABLET, EXTENDED RELEASE ORAL ONCE
Status: DISCONTINUED | OUTPATIENT
Start: 2019-02-11 | End: 2019-02-11

## 2019-02-11 RX ADMIN — POTASSIUM CHLORIDE 40 MEQ: 1500 TABLET, EXTENDED RELEASE ORAL at 09:43

## 2019-02-11 RX ADMIN — ENOXAPARIN SODIUM 40 MG: 100 INJECTION SUBCUTANEOUS at 05:44

## 2019-02-11 RX ADMIN — LEVOTHYROXINE SODIUM 88 MCG: 88 TABLET ORAL at 05:43

## 2019-02-11 RX ADMIN — CEFTRIAXONE SODIUM 1 G: 1 INJECTION, POWDER, FOR SOLUTION INTRAMUSCULAR; INTRAVENOUS at 18:06

## 2019-02-11 RX ADMIN — METOPROLOL TARTRATE 50 MG: 50 TABLET ORAL at 06:00

## 2019-02-11 RX ADMIN — LISINOPRIL 5 MG: 2.5 TABLET ORAL at 09:42

## 2019-02-11 RX ADMIN — HYDRALAZINE HYDROCHLORIDE 10 MG: 10 TABLET, FILM COATED ORAL at 05:44

## 2019-02-11 RX ADMIN — METOPROLOL TARTRATE 50 MG: 50 TABLET ORAL at 18:06

## 2019-02-11 RX ADMIN — POTASSIUM CHLORIDE 40 MEQ: 1500 TABLET, EXTENDED RELEASE ORAL at 05:43

## 2019-02-11 RX ADMIN — HYDRALAZINE HYDROCHLORIDE 10 MG: 10 TABLET ORAL at 06:00

## 2019-02-11 RX ADMIN — SENNOSIDES AND DOCUSATE SODIUM 2 TABLET: 8.6; 5 TABLET ORAL at 18:06

## 2019-02-11 RX ADMIN — POTASSIUM CHLORIDE 40 MEQ: 1500 TABLET, EXTENDED RELEASE ORAL at 18:05

## 2019-02-11 RX ADMIN — SENNOSIDES AND DOCUSATE SODIUM 2 TABLET: 8.6; 5 TABLET ORAL at 05:43

## 2019-02-11 RX ADMIN — HYDRALAZINE HYDROCHLORIDE 10 MG: 10 TABLET ORAL at 23:29

## 2019-02-11 NOTE — CARE PLAN
Problem: Communication  Goal: The ability to communicate needs accurately and effectively will improve  Outcome: MET Date Met: 02/11/19  Patient uses call light as necessary to contact medical staff    Problem: Safety  Goal: Will remain free from falls  Outcome: MET Date Met: 02/11/19  Patient in room close to nurses station with all bed rails up. Wrist restraints on

## 2019-02-11 NOTE — CARE PLAN
Problem: Knowledge Deficit  Goal: Knowledge of disease process/condition, treatment plan, diagnostic tests, and medications will improve   Went over POC.

## 2019-02-11 NOTE — PROGRESS NOTES
Hospital Medicine Daily Progress Note    Date of Service  2/11/2019    Chief Complaint  68 y.o. female admitted 2/9/2019 with right hip pain    Hospital Course    *68-year-old female with past medical history of alcohol use and hypertension presented to the hospital on February 9, 2019 with complaint of right hip pain.  Apparently she had a fall 2 days ago and since then she has been having right hip pain.  She underwent CT scan which showed mild closed pelvic fracture and orthopedic surgery evaluated her and recommended weightbearing as tolerated to the right lower extremity and physical therapy for mobilizing with a walker.  She needs follow-up with orthopedic surgery as outpatient in 2-4 weeks with repeat x-ray.*      Interval Problem Update  I evaluated at the bedside this morning.  She was alert but confused and unable to answer my questions appropriately.  Continue to have hypokalemia with current potassium level of 3.2 I started her on potassium replacement therapy with 40 M EQ twice daily for a total of 2 doses.  Her hemoglobin remained stable.  I ordered workup for her macrocytosis and her vitamin B12 and folic acid are within normal limits and her TSH is elevated with current level of 31.89.  I increased dose of levothyroxine and ordered T4.  Due to elevated blood pressure and finding of proteinuria on UA I started her on lisinopril 5 mg.  UA showed occult blood, proteinuria, positive for nitrite, WBC of 100-150.  I ordered culture on current urine sample.  Continue IV ceftriaxone.  Discussed plan of care during multidisciplinary rounds.    Consultants/Specialty  Orthopedic surgery    Code Status  Full    Disposition  Skilled nursing facility    Review of Systems  Review of Systems   Unable to perform ROS: Mental acuity        Physical Exam  Temp:  [36.4 °C (97.5 °F)-37.6 °C (99.6 °F)] 36.6 °C (97.8 °F)  Pulse:  [] 88  Resp:  [16-19] 19  BP: (114-175)/() 114/69  SpO2:  [93 %-95 %] 93  %    Physical Exam   Constitutional: No distress.   HENT:   Head: Normocephalic and atraumatic.   Eyes: Pupils are equal, round, and reactive to light. Right eye exhibits no discharge. Left eye exhibits no discharge.   Neck: Normal range of motion. Neck supple.   Cardiovascular: Normal rate, regular rhythm and normal heart sounds.  Exam reveals no friction rub.    No murmur heard.  Pulmonary/Chest: Effort normal and breath sounds normal. No respiratory distress. She has no wheezes. She has no rales.   Abdominal: Soft. Bowel sounds are normal. She exhibits no distension. There is no tenderness. There is no rebound.   Musculoskeletal: She exhibits no edema or tenderness.   Decreased range of motion in lower extremities due to pain.   Neurological: She is alert. No cranial nerve deficit.   Following commands but unable to answer any questions appropriately.  Moving all her extremities without any focal deficit.   Skin: Skin is warm and dry. No rash noted. She is not diaphoretic. No erythema.   Psychiatric: She has a normal mood and affect. Her behavior is normal.       Fluids    Intake/Output Summary (Last 24 hours) at 02/11/19 0909  Last data filed at 02/10/19 2000   Gross per 24 hour   Intake              360 ml   Output                0 ml   Net              360 ml       Laboratory  Recent Labs      02/09/19   1410  02/10/19   0429   WBC  7.0  6.5   RBC  3.80*  4.25   HEMOGLOBIN  13.6  14.8   HEMATOCRIT  40.9  45.0   MCV  107.6*  105.9*   MCH  35.8*  34.8*   MCHC  33.3*  32.9*   RDW  50.3*  49.1   PLATELETCT  149*  170   MPV  9.9  10.3     Recent Labs      02/09/19   1410  02/10/19   0429  02/11/19   0336   SODIUM  142  138  134*   POTASSIUM  4.1  3.2*  3.2*   CHLORIDE  108  104  103   CO2  20  23  20   GLUCOSE  105*  99  124*   BUN  27*  21  19   CREATININE  1.06  0.90  0.79   CALCIUM  9.9  9.6  9.1                   Imaging  CT-PELVIS W/O PLUS RECONS   Final Result      1.  Mildly displaced fractures of the right  superior and inferior pubic rami.      2.  Extensive emphysematous change of the wall of the urinary bladder with a small amount of gas seen extending into the prevesical space. There is no evidence of surrounding fluid collection. 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    There is no evidence of surrounding hematoma or fluid collection to suggest presence of bladder injury.      DX-HIP-COMPLETE - UNILATERAL 2+ RIGHT   Final Result      1.  Possible right inferior pubic ramus fracture.      2.  No evidence of right proximal femoral fracture.      3.  Prior left hip arthroplasty.      4.  Gas seen surrounding the urinary bladder. This can be seen with emphysematous cystitis as well as with instrumentation or recent surgery.           Assessment/Plan  * Closed fracture of pelvis (HCC)- (present on admission)   Assessment & Plan    She presented with after fall.  She denies losing consciousness on presentation.  CT scan showed mildly displaced fracture of the right superior and inferior pubic rami.  Orthopedic surgery evaluated her and recommended conservative management and follow-up as outpatient.  Continue pain control  Physical therapy       Acute encephalopathy   Assessment & Plan    Today she has abrupt change in her mental condition.  And she was unrestrained overnight.  Mentation change most likely secondary to her urinary tract infection.  I added urine culture and her current urine sample.  Continue ceftriaxone.  Her change in mental condition could be related to hypothyroidism as her TSH level is 31.89.  I ordered T4 level for tomorrow morning.  I ordered ammonia level for tomorrow morning.  Continue to monitor her closely.       Macrocytosis   Assessment & Plan    She found to have macrocytosis it could be related to alcohol use.  Vitamin B12, folic acid are within normal limits  Found to have significantly elevated TSH.  Increased dose of levothyroxine.     Fall- (present on  admission)   Assessment & Plan    Mechanical fall.  Physical therapy evaluated her and recommended transitional care facility.  I requested a skilled nursing facility referral.     Acute cystitis without hematuria- (present on admission)   Assessment & Plan    Her CT scan pelvis showed extensive emphysematous change of the wall of the urinary bladder with a small amount of gas seen extending into the perivesicular space.  She has sudden change in her mental condition today.  Her UA is positive for infection I had a urine culture on her current sample.  Continue antibiotic for now.  Follow cultures.         Hypothyroid- (present on admission)   Assessment & Plan    She found to have significantly elevated TSH.  I increased the dose of levothyroxine.  Her altered mental status could be related to hypothyroidism.     Former tobacco use- (present on admission)   Assessment & Plan    Smoking cessation counseling provided.         Hypokalemia- (present on admission)   Assessment & Plan    Found to have hypokalemia replace potassium.  Labs in a.m.     Alcohol use- (present on admission)   Assessment & Plan    Counseling provided.          VTE prophylaxis: Lovenox

## 2019-02-11 NOTE — THERAPY
"Occupational Therapy Evaluation completed.   Functional Status:  Pt is 69 y/o female admitted for R anterior pelvic ring fx following GLF. Pt is WBAT of RLE. Pt slightly confused during eval, RN reports she was getting disoriented on her pain medications and was weaning off of them. Pt lives with SO in a H with 1 step into home. Pt is currently limited by pain, cannot ambulate more than 5' d/t pain. Pt has had 3 falls in the past 6 months and SO works full-time. Pt is not safe for d/c home and will benefit from post acute rehab to improve balance and LE strength. Continued skilled OT services to improve transfers and LE dressing as these are main barriers to d/c.   Plan of Care: Will benefit from Occupational Therapy 3 times per week  Discharge Recommendations:  Equipment: Will Continue to Assess for Equipment Needs.     Recommend inpatient transitional care services for continued occupational therapy services.     See \"Rehab Therapy-Acute\" Patient Summary Report for complete documentation.    "

## 2019-02-11 NOTE — ASSESSMENT & PLAN NOTE
She found to have macrocytosis it could be related to alcohol use.  folic acid are within normal limits  Found to have significantly elevated TSH.  Increased dose of levothyroxine.

## 2019-02-11 NOTE — PROGRESS NOTES
"Patient AXO x1. She only knows her name and . Patient believes that she is at home. Frequently tried reorienting patient and she would not believe anything I told her. Patient pulled IV and hospital wristband. At 2244 hospitalist  was paged regarding a PRN dose of haldol because patient was becoming increasingly restless and trying to get up out of bed to go to \"her bedroom\". Lapbelt placed on patient. She kept taking it off and trying to get out of bed. At 0114 hospitalist Dr.Omar Bishop was paged regarding wrist restraints. Order for bilateral wrist restraints was received. Patient currently laying flat in bed talking with nobody else in the room.  "

## 2019-02-11 NOTE — PROGRESS NOTES
Hospital Medicine Daily Progress Note    Date of Service  2/10/2019    Chief Complaint  68 y.o. female admitted 2/9/2019 with right hip pain    Hospital Course    *68-year-old female with past medical history of alcohol use and hypertension presented to the hospital on February 9, 2019 with complaint of right hip pain.  Apparently she had a fall 2 days ago and since then she has been having right hip pain.  She underwent CT scan which showed mild closed pelvic fracture and orthopedic surgery evaluated her and recommended weightbearing as tolerated to the right lower extremity and physical therapy for mobilizing with a walker.  She needs follow-up with orthopedic surgery as outpatient in 2-4 weeks with repeat x-ray.*      Interval Problem Update  I evaluated at the bedside this morning.  She explains that she has pain in her pelvis.  She denies chest pain, shortness of breath and any other acute complaints.  She found to have hypokalemia and I replaced her potassium.  She found to have macrocytosis with MCV of 107.6 ordered blood workup which include B12, folic acid and TSH  Physical therapy evaluated her and recommended a skilled nursing facility.  I ordered a skilled nursing facility referral.  Continue IV antibiotic for possible UTI due to finding of CT scan pelvis.  I requested her to provide a urine sample.  I discussed plan of care with RN at the bedside.    Consultants/Specialty  Orthopedic surgery    Code Status  Full    Disposition  Skilled nursing facility    Review of Systems  Review of Systems   Constitutional: Negative for chills, fever and weight loss.   HENT: Negative for hearing loss and tinnitus.    Eyes: Negative for blurred vision, double vision, photophobia and pain.   Respiratory: Negative for cough, sputum production and shortness of breath.    Cardiovascular: Negative for chest pain, palpitations, orthopnea and leg swelling.   Gastrointestinal: Negative for abdominal pain, constipation,  diarrhea, nausea and vomiting.   Genitourinary: Negative for dysuria, frequency and urgency.   Musculoskeletal: Positive for joint pain and myalgias. Negative for back pain and neck pain.   Skin: Negative for rash.   Neurological: Negative for dizziness, tingling, tremors, sensory change, speech change, focal weakness and headaches.   Psychiatric/Behavioral: Negative for hallucinations and substance abuse.   All other systems reviewed and are negative.       Physical Exam  Temp:  [36.2 °C (97.2 °F)-36.6 °C (97.9 °F)] 36.4 °C (97.5 °F)  Pulse:  [] 74  Resp:  [16-18] 16  BP: (120-197)/() 143/85  SpO2:  [90 %-98 %] 95 %    Physical Exam   Constitutional: She is oriented to person, place, and time. No distress.   HENT:   Head: Normocephalic and atraumatic.   Eyes: Pupils are equal, round, and reactive to light. Right eye exhibits no discharge. Left eye exhibits no discharge.   Neck: Normal range of motion. Neck supple.   Cardiovascular: Normal rate, regular rhythm and normal heart sounds.  Exam reveals no friction rub.    No murmur heard.  Pulmonary/Chest: Effort normal and breath sounds normal. No respiratory distress. She has no wheezes. She has no rales.   Abdominal: Soft. Bowel sounds are normal. She exhibits no distension. There is no tenderness. There is no rebound.   Musculoskeletal: She exhibits no edema or tenderness.   Decreased range of motion in lower extremities due to pain.   Neurological: She is alert and oriented to person, place, and time. No cranial nerve deficit.   Skin: Skin is warm and dry. No rash noted. She is not diaphoretic. No erythema.   Psychiatric: She has a normal mood and affect. Her behavior is normal.       Fluids    Intake/Output Summary (Last 24 hours) at 02/10/19 1859  Last data filed at 02/10/19 1400   Gross per 24 hour   Intake              560 ml   Output                0 ml   Net              560 ml       Laboratory  Recent Labs      02/09/19   1410  02/10/19   7105    WBC  7.0  6.5   RBC  3.80*  4.25   HEMOGLOBIN  13.6  14.8   HEMATOCRIT  40.9  45.0   MCV  107.6*  105.9*   MCH  35.8*  34.8*   MCHC  33.3*  32.9*   RDW  50.3*  49.1   PLATELETCT  149*  170   MPV  9.9  10.3     Recent Labs      02/09/19   1410  02/10/19   0429   SODIUM  142  138   POTASSIUM  4.1  3.2*   CHLORIDE  108  104   CO2  20  23   GLUCOSE  105*  99   BUN  27*  21   CREATININE  1.06  0.90   CALCIUM  9.9  9.6                   Imaging  CT-PELVIS W/O PLUS RECONS   Final Result      1.  Mildly displaced fractures of the right superior and inferior pubic rami.      2.  Extensive emphysematous change of the wall of the urinary bladder with a small amount of gas seen extending into the prevesical space. There is no evidence of surrounding fluid collection. 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    There is no evidence of surrounding hematoma or fluid collection to suggest presence of bladder injury.      DX-HIP-COMPLETE - UNILATERAL 2+ RIGHT   Final Result      1.  Possible right inferior pubic ramus fracture.      2.  No evidence of right proximal femoral fracture.      3.  Prior left hip arthroplasty.      4.  Gas seen surrounding the urinary bladder. This can be seen with emphysematous cystitis as well as with instrumentation or recent surgery.           Assessment/Plan  * Closed fracture of pelvis (HCC)- (present on admission)   Assessment & Plan    She presented with after fall.  She denies losing consciousness.  CT scan showed mildly displaced fracture of the right superior and inferior pubic rami.  Orthopedic surgery evaluated her and recommended conservative management and follow-up as outpatient.  Continue pain control  Physical therapy       Macrocytosis   Assessment & Plan    She found to have macrocytosis it could be related to alcohol use.  Evaluated I ordered B12, folic acid and TSH.     Fall- (present on admission)   Assessment & Plan    Mechanical fall.  Physical therapy  evaluated her and recommended transitional care facility.  I requested a skilled nursing facility referral.     Acute cystitis without hematuria- (present on admission)   Assessment & Plan    Her CT scan pelvis showed extensive emphysematous change of the wall of the urinary bladder with a small amount of gas seen extending into the perivesicular space.  She denies any symptoms of UTI  Requested her to provide a urine sample.  Continue antibiotic for now  I will de-escalate or discontinue antibiotic as per urinalysis.       Former tobacco use- (present on admission)   Assessment & Plan    Smoking cessation education provided.         Hypokalemia- (present on admission)   Assessment & Plan    Found to have hypokalemia replace potassium.  Labs in a.m.     Alcohol use- (present on admission)   Assessment & Plan    Counseling provided.          VTE prophylaxis: Lovenox

## 2019-02-11 NOTE — PROGRESS NOTES
Attempted to call patients friend who she lives with to update him about her current situation. The phone would ring and it said call could not be completed.

## 2019-02-12 PROBLEM — E53.8 VITAMIN B12 DEFICIENCY: Status: ACTIVE | Noted: 2019-02-12

## 2019-02-12 LAB
AMMONIA PLAS-SCNC: 19 UMOL/L (ref 11–45)
ANION GAP SERPL CALC-SCNC: 11 MMOL/L (ref 0–11.9)
BACTERIA UR CULT: NORMAL
BASOPHILS # BLD AUTO: 0.8 % (ref 0–1.8)
BASOPHILS # BLD: 0.06 K/UL (ref 0–0.12)
BUN SERPL-MCNC: 25 MG/DL (ref 8–22)
CALCIUM SERPL-MCNC: 9.1 MG/DL (ref 8.5–10.5)
CHLORIDE SERPL-SCNC: 106 MMOL/L (ref 96–112)
CO2 SERPL-SCNC: 22 MMOL/L (ref 20–33)
CREAT SERPL-MCNC: 1.14 MG/DL (ref 0.5–1.4)
EOSINOPHIL # BLD AUTO: 0.13 K/UL (ref 0–0.51)
EOSINOPHIL NFR BLD: 1.7 % (ref 0–6.9)
ERYTHROCYTE [DISTWIDTH] IN BLOOD BY AUTOMATED COUNT: 49.1 FL (ref 35.9–50)
GLUCOSE SERPL-MCNC: 105 MG/DL (ref 65–99)
HCT VFR BLD AUTO: 45.8 % (ref 37–47)
HGB BLD-MCNC: 15.2 G/DL (ref 12–16)
IMM GRANULOCYTES # BLD AUTO: 0.02 K/UL (ref 0–0.11)
IMM GRANULOCYTES NFR BLD AUTO: 0.3 % (ref 0–0.9)
LYMPHOCYTES # BLD AUTO: 1.61 K/UL (ref 1–4.8)
LYMPHOCYTES NFR BLD: 20.8 % (ref 22–41)
MCH RBC QN AUTO: 35.2 PG (ref 27–33)
MCHC RBC AUTO-ENTMCNC: 33.2 G/DL (ref 33.6–35)
MCV RBC AUTO: 106 FL (ref 81.4–97.8)
MONOCYTES # BLD AUTO: 0.67 K/UL (ref 0–0.85)
MONOCYTES NFR BLD AUTO: 8.7 % (ref 0–13.4)
NEUTROPHILS # BLD AUTO: 5.25 K/UL (ref 2–7.15)
NEUTROPHILS NFR BLD: 67.7 % (ref 44–72)
NRBC # BLD AUTO: 0 K/UL
NRBC BLD-RTO: 0 /100 WBC
PLATELET # BLD AUTO: 226 K/UL (ref 164–446)
PMV BLD AUTO: 10.2 FL (ref 9–12.9)
POTASSIUM SERPL-SCNC: 4.3 MMOL/L (ref 3.6–5.5)
RBC # BLD AUTO: 4.32 M/UL (ref 4.2–5.4)
SIGNIFICANT IND 70042: NORMAL
SITE SITE: NORMAL
SODIUM SERPL-SCNC: 139 MMOL/L (ref 135–145)
SOURCE SOURCE: NORMAL
T4 FREE SERPL-MCNC: 0.56 NG/DL (ref 0.53–1.43)
WBC # BLD AUTO: 7.7 K/UL (ref 4.8–10.8)

## 2019-02-12 PROCEDURE — 770001 HCHG ROOM/CARE - MED/SURG/GYN PRIV*

## 2019-02-12 PROCEDURE — 700111 HCHG RX REV CODE 636 W/ 250 OVERRIDE (IP): Performed by: INTERNAL MEDICINE

## 2019-02-12 PROCEDURE — 700111 HCHG RX REV CODE 636 W/ 250 OVERRIDE (IP): Performed by: HOSPITALIST

## 2019-02-12 PROCEDURE — 700102 HCHG RX REV CODE 250 W/ 637 OVERRIDE(OP): Performed by: INTERNAL MEDICINE

## 2019-02-12 PROCEDURE — 97530 THERAPEUTIC ACTIVITIES: CPT

## 2019-02-12 PROCEDURE — A9270 NON-COVERED ITEM OR SERVICE: HCPCS | Performed by: INTERNAL MEDICINE

## 2019-02-12 PROCEDURE — 80048 BASIC METABOLIC PNL TOTAL CA: CPT

## 2019-02-12 PROCEDURE — 85025 COMPLETE CBC W/AUTO DIFF WBC: CPT

## 2019-02-12 PROCEDURE — 36415 COLL VENOUS BLD VENIPUNCTURE: CPT

## 2019-02-12 PROCEDURE — 700105 HCHG RX REV CODE 258: Performed by: INTERNAL MEDICINE

## 2019-02-12 PROCEDURE — 82140 ASSAY OF AMMONIA: CPT

## 2019-02-12 PROCEDURE — 84439 ASSAY OF FREE THYROXINE: CPT

## 2019-02-12 PROCEDURE — 99232 SBSQ HOSP IP/OBS MODERATE 35: CPT | Performed by: HOSPITALIST

## 2019-02-12 RX ORDER — CHOLECALCIFEROL (VITAMIN D3) 125 MCG
1000 CAPSULE ORAL DAILY
Status: DISCONTINUED | OUTPATIENT
Start: 2019-02-13 | End: 2019-02-13 | Stop reason: HOSPADM

## 2019-02-12 RX ORDER — CYANOCOBALAMIN 1000 UG/ML
1000 INJECTION, SOLUTION INTRAMUSCULAR; SUBCUTANEOUS ONCE
Status: COMPLETED | OUTPATIENT
Start: 2019-02-12 | End: 2019-02-12

## 2019-02-12 RX ADMIN — HYDRALAZINE HYDROCHLORIDE 10 MG: 10 TABLET ORAL at 15:58

## 2019-02-12 RX ADMIN — CYANOCOBALAMIN 1000 MCG: 1000 INJECTION, SOLUTION INTRAMUSCULAR; SUBCUTANEOUS at 10:18

## 2019-02-12 RX ADMIN — CEFTRIAXONE SODIUM 1 G: 1 INJECTION, POWDER, FOR SOLUTION INTRAMUSCULAR; INTRAVENOUS at 17:52

## 2019-02-12 RX ADMIN — HYDRALAZINE HYDROCHLORIDE 10 MG: 10 TABLET ORAL at 20:50

## 2019-02-12 RX ADMIN — HYDRALAZINE HYDROCHLORIDE 10 MG: 10 TABLET ORAL at 06:10

## 2019-02-12 RX ADMIN — METOPROLOL TARTRATE 50 MG: 50 TABLET ORAL at 06:10

## 2019-02-12 RX ADMIN — LISINOPRIL 5 MG: 2.5 TABLET ORAL at 06:10

## 2019-02-12 RX ADMIN — LEVOTHYROXINE SODIUM 100 MCG: 100 TABLET ORAL at 06:10

## 2019-02-12 RX ADMIN — OXYCODONE HYDROCHLORIDE 5 MG: 5 TABLET ORAL at 10:18

## 2019-02-12 RX ADMIN — METOPROLOL TARTRATE 50 MG: 50 TABLET ORAL at 17:32

## 2019-02-12 RX ADMIN — ACETAMINOPHEN 650 MG: 325 TABLET, FILM COATED ORAL at 17:52

## 2019-02-12 RX ADMIN — ENOXAPARIN SODIUM 40 MG: 100 INJECTION SUBCUTANEOUS at 06:10

## 2019-02-12 ASSESSMENT — ENCOUNTER SYMPTOMS
DIARRHEA: 0
MYALGIAS: 1
BLURRED VISION: 0
VOMITING: 0
ORTHOPNEA: 0
SPUTUM PRODUCTION: 0
TREMORS: 0
CONSTIPATION: 0
SHORTNESS OF BREATH: 0
HEADACHES: 0
NAUSEA: 0
WEIGHT LOSS: 0
TINGLING: 0
FEVER: 0
CHILLS: 0
ABDOMINAL PAIN: 0
DIZZINESS: 0
COUGH: 0
NECK PAIN: 0
BACK PAIN: 0
HALLUCINATIONS: 0

## 2019-02-12 ASSESSMENT — GAIT ASSESSMENTS
GAIT LEVEL OF ASSIST: UNABLE TO PARTICIPATE
ASSISTIVE DEVICE: FRONT WHEEL WALKER

## 2019-02-12 ASSESSMENT — LIFESTYLE VARIABLES: SUBSTANCE_ABUSE: 0
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
## 2019-02-12 NOTE — THERAPY
"Physical Therapy Treatment completed.   Bed Mobility:  Supine to Sit: Maximal Assist  Transfers: Sit to Stand: Maximal Assist  Gait: Level Of Assist: Unable to Participate with Front-Wheel Walker       Plan of Care: Will benefit from Physical Therapy 3 times per week  Discharge Recommendations: Equipment: Will Continue to Assess for Equipment Needs. Post-acute therapy  Recommend inpatient transitional care services for continued physical therapy services.    See \"Rehab Therapy-Acute\" Patient Summary Report for complete documentation.       "

## 2019-02-12 NOTE — DISCHARGE PLANNING
Agency/Facility Name: [facility]  Spoke To: Aurelio  Outcome: accepted, needs to be ok with non smoking policy.

## 2019-02-12 NOTE — ASSESSMENT & PLAN NOTE
She found to have significantly elevated TSH.  Levothyroxine dose has now been increased from 88 to 100

## 2019-02-12 NOTE — DISCHARGE PLANNING
Received Choice form at 9289  Agency/Facility Name: [facility]  Referral sent per Choice form @ 2597
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
## 2019-02-12 NOTE — DISCHARGE PLANNING
Due to Pt orientation LSW attempted to contact the Pt roommate/friend on her emergency contact list Triston Bowling; however the phone message stated that the phone is not accepting calls at this time.

## 2019-02-12 NOTE — CARE PLAN
Problem: Safety  Goal: Will remain free from falls    Intervention: Implement fall precautions  Pt calls appropriately, treaded socks in use. Personal belongings and call light with in reach and bed is locked in lowest position. Hourly rounding in place      Problem: Venous Thromboembolism (VTW)/Deep Vein Thrombosis (DVT) Prevention:  Goal: Patient will participate in Venous Thrombosis (VTE)/Deep Vein Thrombosis (DVT)Prevention Measures    Intervention: Ensure patient wears graduated elastic stockings (RENNY hose) and/or SCDs, if ordered, when in bed or chair (Remove at least once per shift for skin check)  SCD's and LMWH in use, education provided on prophylactic treatment

## 2019-02-13 VITALS
WEIGHT: 126.76 LBS | RESPIRATION RATE: 16 BRPM | TEMPERATURE: 97.7 F | OXYGEN SATURATION: 95 % | BODY MASS INDEX: 21.64 KG/M2 | DIASTOLIC BLOOD PRESSURE: 74 MMHG | SYSTOLIC BLOOD PRESSURE: 134 MMHG | HEART RATE: 86 BPM | HEIGHT: 64 IN

## 2019-02-13 PROBLEM — E55.9 VITAMIN D DEFICIENCY: Status: ACTIVE | Noted: 2019-02-13

## 2019-02-13 PROBLEM — E61.1 IRON DEFICIENCY: Status: ACTIVE | Noted: 2019-02-13

## 2019-02-13 LAB
25(OH)D3 SERPL-MCNC: 10 NG/ML (ref 30–100)
ALBUMIN SERPL BCP-MCNC: 4.2 G/DL (ref 3.2–4.9)
BUN SERPL-MCNC: 25 MG/DL (ref 8–22)
CALCIUM SERPL-MCNC: 9.5 MG/DL (ref 8.5–10.5)
CHLORIDE SERPL-SCNC: 103 MMOL/L (ref 96–112)
CO2 SERPL-SCNC: 20 MMOL/L (ref 20–33)
CREAT SERPL-MCNC: 1.06 MG/DL (ref 0.5–1.4)
ERYTHROCYTE [DISTWIDTH] IN BLOOD BY AUTOMATED COUNT: 51.9 FL (ref 35.9–50)
FERRITIN SERPL-MCNC: 61.6 NG/ML (ref 10–291)
GLUCOSE SERPL-MCNC: 88 MG/DL (ref 65–99)
HCT VFR BLD AUTO: 47.6 % (ref 37–47)
HGB BLD-MCNC: 15.2 G/DL (ref 12–16)
IRON SATN MFR SERPL: 6 % (ref 15–55)
IRON SERPL-MCNC: 28 UG/DL (ref 40–170)
MCH RBC QN AUTO: 35.2 PG (ref 27–33)
MCHC RBC AUTO-ENTMCNC: 31.9 G/DL (ref 33.6–35)
MCV RBC AUTO: 110.2 FL (ref 81.4–97.8)
PHOSPHATE SERPL-MCNC: 3.6 MG/DL (ref 2.5–4.5)
PLATELET # BLD AUTO: 176 K/UL (ref 164–446)
PMV BLD AUTO: 10.8 FL (ref 9–12.9)
POTASSIUM SERPL-SCNC: 4.7 MMOL/L (ref 3.6–5.5)
RBC # BLD AUTO: 4.32 M/UL (ref 4.2–5.4)
SODIUM SERPL-SCNC: 136 MMOL/L (ref 135–145)
TIBC SERPL-MCNC: 476 UG/DL (ref 250–450)
WBC # BLD AUTO: 7.5 K/UL (ref 4.8–10.8)

## 2019-02-13 PROCEDURE — 82728 ASSAY OF FERRITIN: CPT

## 2019-02-13 PROCEDURE — 84207 ASSAY OF VITAMIN B-6: CPT

## 2019-02-13 PROCEDURE — A9270 NON-COVERED ITEM OR SERVICE: HCPCS | Performed by: HOSPITALIST

## 2019-02-13 PROCEDURE — 700111 HCHG RX REV CODE 636 W/ 250 OVERRIDE (IP): Performed by: INTERNAL MEDICINE

## 2019-02-13 PROCEDURE — 82306 VITAMIN D 25 HYDROXY: CPT

## 2019-02-13 PROCEDURE — 700102 HCHG RX REV CODE 250 W/ 637 OVERRIDE(OP): Performed by: INTERNAL MEDICINE

## 2019-02-13 PROCEDURE — A9270 NON-COVERED ITEM OR SERVICE: HCPCS | Performed by: INTERNAL MEDICINE

## 2019-02-13 PROCEDURE — 85027 COMPLETE CBC AUTOMATED: CPT

## 2019-02-13 PROCEDURE — 83540 ASSAY OF IRON: CPT

## 2019-02-13 PROCEDURE — 83550 IRON BINDING TEST: CPT

## 2019-02-13 PROCEDURE — 700102 HCHG RX REV CODE 250 W/ 637 OVERRIDE(OP): Performed by: HOSPITALIST

## 2019-02-13 PROCEDURE — 99239 HOSP IP/OBS DSCHRG MGMT >30: CPT | Performed by: HOSPITALIST

## 2019-02-13 PROCEDURE — 36415 COLL VENOUS BLD VENIPUNCTURE: CPT

## 2019-02-13 PROCEDURE — 80069 RENAL FUNCTION PANEL: CPT

## 2019-02-13 PROCEDURE — 84425 ASSAY OF VITAMIN B-1: CPT

## 2019-02-13 RX ORDER — FERROUS SULFATE 325(65) MG
325 TABLET ORAL
Status: DISCONTINUED | OUTPATIENT
Start: 2019-02-14 | End: 2019-02-13 | Stop reason: HOSPADM

## 2019-02-13 RX ORDER — ERGOCALCIFEROL 1.25 MG/1
50000 CAPSULE ORAL
Qty: 30 CAP
Start: 2019-02-20 | End: 2019-04-11

## 2019-02-13 RX ORDER — ERGOCALCIFEROL 1.25 MG/1
50000 CAPSULE ORAL
Status: DISCONTINUED | OUTPATIENT
Start: 2019-02-13 | End: 2019-02-13 | Stop reason: HOSPADM

## 2019-02-13 RX ORDER — HYDROCODONE BITARTRATE AND ACETAMINOPHEN 5; 325 MG/1; MG/1
1 TABLET ORAL EVERY 8 HOURS PRN
Qty: 6 TAB | Refills: 0 | Status: SHIPPED | OUTPATIENT
Start: 2019-02-13 | End: 2019-02-15

## 2019-02-13 RX ORDER — LANOLIN ALCOHOL/MO/W.PET/CERES
325 CREAM (GRAM) TOPICAL
Status: ON HOLD
Start: 2019-02-13 | End: 2020-02-11

## 2019-02-13 RX ADMIN — HYDRALAZINE HYDROCHLORIDE 10 MG: 10 TABLET ORAL at 13:34

## 2019-02-13 RX ADMIN — HYDRALAZINE HYDROCHLORIDE 10 MG: 10 TABLET ORAL at 05:56

## 2019-02-13 RX ADMIN — ERGOCALCIFEROL 50000 UNITS: 1.25 CAPSULE ORAL at 09:03

## 2019-02-13 RX ADMIN — LISINOPRIL 5 MG: 2.5 TABLET ORAL at 05:55

## 2019-02-13 RX ADMIN — METOPROLOL TARTRATE 50 MG: 50 TABLET ORAL at 05:56

## 2019-02-13 RX ADMIN — ENOXAPARIN SODIUM 40 MG: 100 INJECTION SUBCUTANEOUS at 05:56

## 2019-02-13 RX ADMIN — LEVOTHYROXINE SODIUM 100 MCG: 100 TABLET ORAL at 05:56

## 2019-02-13 RX ADMIN — CYANOCOBALAMIN TAB 500 MCG 1000 MCG: 500 TAB at 05:56

## 2019-02-13 NOTE — PROGRESS NOTES
Received bedside report from MUMTAZ Coley. Assumed care of patient at 1745. Patient is AAOx2-3. Vital signs stable on room air. Safety and fall precautions in place, bed alarm in use, call light within reach.

## 2019-02-13 NOTE — DISCHARGE INSTRUCTIONS
Discharge Instructions    Discharged to other by medical transportation with escort. Discharged via wheelchair, hospital escort: Yes.  Special equipment needed: Not Applicable    Be sure to schedule a follow-up appointment with your primary care doctor or any specialists as instructed.   Follow up with Dr. Collins within 2-4 weeks  Take medications as prescribed  For any questions or concerns, contact your PCP    Discharge Plan:   Diet Plan: Discussed  Activity Level: Discussed  Confirmed Follow up Appointment: Patient to Call and Schedule Appointment  Confirmed Symptoms Management: Discussed  Medication Reconciliation Updated: Yes  Influenza Vaccine Indication: Patient Refuses    I understand that a diet low in cholesterol, fat, and sodium is recommended for good health. Unless I have been given specific instructions below for another diet, I accept this instruction as my diet prescription.   Other diet: regular diet    Special Instructions: Discharge instructions for the Orthopedic Patient    Follow up with Primary Care Physician within 2 weeks of discharge to home, regarding:  Review of medications and diagnostic testing.  Surveillance for medical complications.  Workup and treatment of osteoporosis, if appropriate.     -Is this a Joint Replacement patient? No    -Is this patient being discharged with medication to prevent blood clots?  No    · Is patient discharged on Warfarin / Coumadin?   No     Simple Pelvic Fracture, Adult  A pelvic fracture is a break in one of the pelvic bones. The pelvic bones include the bones that you sit on and the bones that make up the lower part of your spine. A pelvic fracture is called simple if the broken bones are stable and are not moving out of place.  What are the causes?  Common causes of this type of fracture include:  · A fall.  · A car accident.  · Force or pressure applied to the pelvis.  What increases the risk?  You may be at higher risk for this type of fracture  if:  · You play high-impact sports.  · You are an older person with a condition that causes weak bones (osteoporosis).  · You have a bone-weakening disease.  What are the signs or symptoms?  Signs and symptoms may include:  · Tenderness, swelling, or bruising in the affected area.  · Pain when moving the hip.  · Pain when walking or standing.  How is this diagnosed?  A diagnosis is made with a physical exam and X-rays. Sometimes, a CT scan is also done.  How is this treated?  The goal of treatment is to get the bones to heal in a good position. Treatment of a simple pelvic fracture usually involves staying in bed (bed rest) and using crutches or a walker until the bones heal. Medicines may be prescribed for pain. Medicines may also be prescribed that help to prevent blood clots from forming in the legs.  Follow these instructions at home:  Managing pain, stiffness, and swelling  · If directed, apply ice to the injured area:  ¨ Put ice in a plastic bag.  ¨ Place a towel between your skin and the bag.  ¨ Leave the ice on for 20 minutes, 2-3 times a day.  · Raise the injured area above the level of your heart while you are sitting or lying down.  Driving  · Do not  drive or operate heavy machinery until your health care provider tells you it is safe to do.  Activity  · Stay on bed rest for as long as directed by your health care provider.  · While on bed rest:  ¨ Change the position of your legs every 1-2 hours. This keeps blood moving well through both of your legs.  ¨ You may sit for as long as you feel comfortable.  · After bed rest:  ¨ Avoid strenuous activities for as long as directed by your health care provider.  ¨ Return to your normal activities as directed by your health care provider. Ask your health care provider what activities are safe for you.  Safety  · Do not use the injured limb to support your body weight until your health care provider says that you can. Use crutches or a walker as directed by your  health care provider.  General instructions  · Do not use any tobacco products, including cigarettes, chewing tobacco, or electronic cigarettes. Tobacco can delay bone healing. If you need help quitting, ask your health care provider.  · Take medicines only as directed by your health care provider.  · Keep all follow-up visits as directed by your health care provider. This is important.  Contact a health care provider if:  · Your pain gets worse.  · Your pain is not relieved with medicines.  Get help right away if:  · You feel light-headed or faint.  · You develop chest pain.  · You develop shortness of breath.  · You have a fever.  · You have blood in your urine or your stools.  · You have vaginal bleeding.  · You have difficulty or pain with urination or with a bowel movement.  · You have difficulty or increased pain with walking.  · You have new or increased swelling in one of your legs.  · You have numbness in your legs or groin area.  This information is not intended to replace advice given to you by your health care provider. Make sure you discuss any questions you have with your health care provider.  Document Released: 02/26/2003 Document Revised: 08/13/2017 Document Reviewed: 08/11/2015  [employer] Interactive Patient Education © 2017 [employer]      Depression / Suicide Risk    As you are discharged from this [facility] facility, it is important to learn how to keep safe from harming yourself.    Recognize the warning signs:  · Abrupt changes in personality, positive or negative- including increase in energy   · Giving away possessions  · Change in eating patterns- significant weight changes-  positive or negative  · Change in sleeping patterns- unable to sleep or sleeping all the time   · Unwillingness or inability to communicate  · Depression  · Unusual sadness, discouragement and loneliness  · Talk of wanting to die  · Neglect of personal appearance   · Rebelliousness- reckless  behavior  · Withdrawal from people/activities they love  · Confusion- inability to concentrate     If you or a loved one observes any of these behaviors or has concerns about self-harm, here's what you can do:  · Talk about it- your feelings and reasons for harming yourself  · Remove any means that you might use to hurt yourself (examples: pills, rope, extension cords, firearm)  · Get professional help from the community (Mental Health, Substance Abuse, psychological counseling)  · Do not be alone:Call your Safe Contact- someone whom you trust who will be there for you.  · Call your local CRISIS HOTLINE 561-7865 or 821-837-8946  · Call your local Children's Mobile Crisis Response Team [place] (818) 893-4168 or www.[facility]  · Call the toll free National Suicide Prevention Hotlines   · National Suicide Prevention Lifeline 300-006-ZHFO (7679)  · National Hope Line Network 800-SUICIDE (268-7216)

## 2019-02-13 NOTE — DISCHARGE PLANNING
Agency/Facility Name: [facility] insurance ([phone])  Spoke To: Charm (Ref # [id number])  Outcome: Patient's SNF benefits are as followed:  Patient does not have a deductible, out of pocket is $4900, nothing met as of today. Days 1-20 no copay Days 21-51 $160/day copay Days  no copay. 100 days/year- non met. 3 midnight stay not required.

## 2019-02-13 NOTE — DISCHARGE SUMMARY
Hospital Medicine Discharge Note     Admit Date:  2/9/2019       Discharge Date:   2/13/2019    Attending Physician:  Kelechi Mora     Diagnoses (includes active and resolved):   Principal Problem:    Closed fracture of pelvis (HCC) POA: Yes  Active Problems:    Acute encephalopathy POA: Unknown    Alcohol use POA: Yes    Hypokalemia POA: Yes    Former tobacco use POA: Yes    Hypothyroid POA: Yes    Acute cystitis without hematuria POA: Yes    Fall POA: Yes    Macrocytosis POA: Unknown    Vitamin B12 deficiency POA: Yes    Iron deficiency POA: Yes    Vitamin D deficiency POA: Yes  Resolved Problems:    * No resolved hospital problems. *      Hospital Summary (Brief Narrative):        68-year-old female with past medical history of alcohol use and hypertension presented to the hospital on February 9, 2019 with complaint of right hip pain.  Apparently she had a fall 2 days ago and since then she has been having right hip pain.  She underwent CT scan which showed mild closed pelvic fracture and orthopedic surgery evaluated her and recommended weightbearing as tolerated to the right lower extremity and physical therapy for mobilizing with a walker.  She needs follow-up with orthopedic surgery as outpatient in 2-4 weeks with repeat x-ray.  She was found to have both iron deficiency as well as vitamin B12 and vitamin D deficiency.  Thus she was started on supplementation.  The patient met 2-midnight criteria for an inpatient stay at the time of discharge.     Consultants:      Ortho Dr. Collins    Procedures:        None    Discharge Medications:           Medication List      START taking these medications      Instructions   cyanocobalamin 1000 MCG Tabs  Start taking on:  2/14/2019  Commonly known as:  VITAMIN B12   Take 1 Tab by mouth every day.  Dose:  1000 mcg     ferrous sulfate 325 (65 Fe) MG EC tablet   Take 1 Tab by mouth every day before lunch.  Dose:  325 mg     HYDROcodone-acetaminophen 5-325 MG Tabs per  tablet  Commonly known as:  NORCO   Take 1 Tab by mouth every 8 hours as needed for up to 2 days.  Dose:  1 Tab     vitamin D (Ergocalciferol) 24183 units Caps capsule  Start taking on:  2/20/2019  Commonly known as:  DRISDOL   Take 1 Cap by mouth every 7 days for 8 doses.  Dose:  77408 Units        CONTINUE taking these medications      Instructions   levothyroxine 88 MCG Tabs  Commonly known as:  SYNTHROID   Take 88 mcg by mouth Every morning on an empty stomach.  Dose:  88 mcg     metoprolol 50 MG Tabs  Commonly known as:  LOPRESSOR   Take 50 mg by mouth 2 times a day.  Dose:  50 mg          Disposition:   Transfer to skilled nursing facility    Activity:   As tolerated    Code status:   Full code    Primary Care Provider:    Edvin BREEN M.D.    Follow up appointment details :      [facility] ([facility])  [address] [id number]  [phone]        No future appointments.    Pending Studies:        None    Time spent on discharge day patient visit: 42 minutes    #################################################    Interval history/exam for day of discharge:    Vitals:    02/12/19 1731 02/12/19 2039 02/13/19 0452 02/13/19 0900   BP: 118/91 117/72 126/75 104/62   Pulse: 76 65 71 61   Resp:  16 16 20   Temp:  37.4 °C (99.4 °F) 36.3 °C (97.4 °F) 36.9 °C (98.5 °F)   TempSrc:  Temporal Temporal Temporal   SpO2:  94% 92% 93%   Weight:       Height:         Weight/BMI: Body mass index is 21.76 kg/m².  Pulse Oximetry: 93 %, O2 (LPM): 0, O2 Delivery: None (Room Air)    General:  frail  CVS:  RRR  PULM:  CTAB, no respiratory distress    Most Recent Labs:    Lab Results   Component Value Date/Time    WBC 7.5 02/13/2019 06:26 AM    RBC 4.32 02/13/2019 06:26 AM    HEMOGLOBIN 15.2 02/13/2019 06:26 AM    HEMATOCRIT 47.6 (H) 02/13/2019 06:26 AM    .2 (H) 02/13/2019 06:26 AM    MCH 35.2 (H) 02/13/2019 06:26 AM    MCHC 31.9 (L) 02/13/2019 06:26 AM    MPV 10.8 02/13/2019 06:26 AM     NEUTSPOLYS 67.70 02/12/2019 04:48 AM    LYMPHOCYTES 20.80 (L) 02/12/2019 04:48 AM    MONOCYTES 8.70 02/12/2019 04:48 AM    EOSINOPHILS 1.70 02/12/2019 04:48 AM    BASOPHILS 0.80 02/12/2019 04:48 AM    ANISOCYTOSIS 1+ 09/23/2018 01:30 PM      Lab Results   Component Value Date/Time    SODIUM 136 02/13/2019 06:26 AM    POTASSIUM 4.7 02/13/2019 06:26 AM    CHLORIDE 103 02/13/2019 06:26 AM    CO2 20 02/13/2019 06:26 AM    GLUCOSE 88 02/13/2019 06:26 AM    BUN 25 (H) 02/13/2019 06:26 AM    CREATININE 1.06 02/13/2019 06:26 AM    CREATININE 0.6 10/01/2007 04:53 PM      Lab Results   Component Value Date/Time    ALTSGPT 9 09/21/2018 06:16 PM    ASTSGOT 20 09/21/2018 06:16 PM    ALKPHOSPHAT 72 09/21/2018 06:16 PM    TBILIRUBIN 0.7 09/21/2018 06:16 PM    DBILIRUBIN 0.3 09/21/2018 06:16 PM    LIPASE 101 (H) 07/18/2014 03:38 PM    ALBUMIN 4.2 02/13/2019 06:26 AM    GLOBULIN 3.8 (H) 03/01/2017 06:20 AM    INR 1.14 (H) 09/21/2018 06:16 PM    MACROCYTOSIS 1+ 09/23/2018 01:30 PM     Lab Results   Component Value Date/Time    PROTHROMBTM 14.7 (H) 09/21/2018 06:16 PM    INR 1.14 (H) 09/21/2018 06:16 PM        Imaging/ Testing:      CT-PELVIS W/O PLUS RECONS   Final Result      1.  Mildly displaced fractures of the right superior and inferior pubic rami.      2.  Extensive emphysematous change of the wall of the urinary bladder with a small amount of gas seen extending into the prevesical space. There is no evidence of surrounding fluid collection. 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    There is no evidence of surrounding hematoma or fluid collection to suggest presence of bladder injury.      DX-HIP-COMPLETE - UNILATERAL 2+ RIGHT   Final Result      1.  Possible right inferior pubic ramus fracture.      2.  No evidence of right proximal femoral fracture.      3.  Prior left hip arthroplasty.      4.  Gas seen surrounding the urinary bladder. This can be seen with emphysematous cystitis as well as with  instrumentation or recent surgery.          Instructions:      The patient was instructed to return to the ER in the event of worsening symptoms. I have counseled the patient on the importance of compliance and the patient has agreed to proceed with all medical recommendations and follow up plan indicated above.   The patient understands that all medications come with benefits and risks. Risks may include permanent injury or death and these risks can be minimized with close reassessment and monitoring.

## 2019-02-13 NOTE — DISCHARGE PLANNING
Agency/Facility Name: [facility]  Spoke To: Aurelio  Outcome: Received auth does not have transportation     Received Transport Form @ 1111  Spoke to Terrance @ [facility]    Transport is scheduled for [facility] @1500 going to [facility].

## 2019-02-13 NOTE — PROGRESS NOTES
Hospital Medicine Daily Progress Note    Date of Service  2/12/2019    Chief Complaint  68 y.o. female admitted 2/9/2019 with right hip pain    Hospital Course    *68-year-old female with past medical history of alcohol use and hypertension presented to the hospital on February 9, 2019 with complaint of right hip pain.  Apparently she had a fall 2 days ago and since then she has been having right hip pain.  She underwent CT scan which showed mild closed pelvic fracture and orthopedic surgery evaluated her and recommended weightbearing as tolerated to the right lower extremity and physical therapy for mobilizing with a walker.  She needs follow-up with orthopedic surgery as outpatient in 2-4 weeks with repeat x-ray.*      Interval Problem Update  2/11: pain in her pelvis.  She denies chest pain, shortness of breath and any other acute   2/12: Referred to SNF.  Repleting vitamin B12 aggressively.  Checking other B vitamins    Consultants/Specialty  Orthopedic surgery    Code Status  Full    Disposition  Skilled nursing facility, patient does not want to go back to [facility] in the future    Review of Systems  Review of Systems   Constitutional: Negative for chills, fever and weight loss.   HENT: Negative for hearing loss.    Eyes: Negative for blurred vision.   Respiratory: Negative for cough, sputum production and shortness of breath.    Cardiovascular: Negative for chest pain and orthopnea.   Gastrointestinal: Negative for abdominal pain, constipation, diarrhea, nausea and vomiting.   Genitourinary: Negative for dysuria.   Musculoskeletal: Positive for joint pain and myalgias. Negative for back pain and neck pain.   Skin: Negative for rash.   Neurological: Negative for dizziness, tingling, tremors and headaches.   Psychiatric/Behavioral: Negative for hallucinations and substance abuse.        Physical Exam  Temp:  [36.1 °C (97 °F)-36.9 °C (98.4 °F)] (P) 36.8 °C (98.2 °F)  Pulse:  [60-98] (P) 74  Resp:  [16-17] (P)  16  BP: (114-157)/(60-98) (P) 129/75  SpO2:  [94 %-98 %] (P) 95 %    Physical Exam   Constitutional: She is oriented to person, place, and time. No distress.   HENT:   Head: Normocephalic and atraumatic.   Eyes: Conjunctivae are normal. Right eye exhibits no discharge. Left eye exhibits no discharge.   Neck: Normal range of motion. Neck supple.   Cardiovascular: Normal rate, regular rhythm and normal heart sounds.  Exam reveals no friction rub.    No murmur heard.  Pulmonary/Chest: Effort normal and breath sounds normal. No respiratory distress. She has no wheezes. She has no rales.   Abdominal: Soft. Bowel sounds are normal. She exhibits no distension. There is no tenderness. There is no rebound.   Musculoskeletal: She exhibits no edema or tenderness.   Decreased range of motion in lower extremities due to pain.   Neurological: She is alert and oriented to person, place, and time. No cranial nerve deficit.   Skin: Skin is warm and dry. No rash noted. She is not diaphoretic. No erythema.   Psychiatric: She has a normal mood and affect. Her behavior is normal.       Fluids    Intake/Output Summary (Last 24 hours) at 02/12/19 1606  Last data filed at 02/12/19 0900   Gross per 24 hour   Intake                0 ml   Output                1 ml   Net               -1 ml       Laboratory  Recent Labs      02/10/19   0429  02/12/19   0448   WBC  6.5  7.7   RBC  4.25  4.32   HEMOGLOBIN  14.8  15.2   HEMATOCRIT  45.0  45.8   MCV  105.9*  106.0*   MCH  34.8*  35.2*   MCHC  32.9*  33.2*   RDW  49.1  49.1   PLATELETCT  170  226   MPV  10.3  10.2     Recent Labs      02/10/19   0429  02/11/19   0336  02/12/19   0448   SODIUM  138  134*  139   POTASSIUM  3.2*  3.2*  4.3   CHLORIDE  104  103  106   CO2  23  20  22   GLUCOSE  99  124*  105*   BUN  21  19  25*   CREATININE  0.90  0.79  1.14   CALCIUM  9.6  9.1  9.1                   Imaging  CT-PELVIS W/O PLUS RECONS   Final Result      1.  Mildly displaced fractures of the right  superior and inferior pubic rami.      2.  Extensive emphysematous change of the wall of the urinary bladder with a small amount of gas seen extending into the prevesical space. There is no evidence of surrounding fluid collection. 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    There is no evidence of surrounding hematoma or fluid collection to suggest presence of bladder injury.      DX-HIP-COMPLETE - UNILATERAL 2+ RIGHT   Final Result      1.  Possible right inferior pubic ramus fracture.      2.  No evidence of right proximal femoral fracture.      3.  Prior left hip arthroplasty.      4.  Gas seen surrounding the urinary bladder. This can be seen with emphysematous cystitis as well as with instrumentation or recent surgery.           Assessment/Plan  Closed fracture of pelvis (HCC)  She presented with after fall.  She denies losing consciousness on presentation.  CT scan showed mildly displaced fracture of the right superior and inferior pubic rami.  Orthopedic surgery evaluated her and recommended conservative management and follow-up as outpatient.  Continue pain control  Physical therapy      Former tobacco use  Smoking cessation counseling provided.        Acute cystitis without hematuria  Her CT scan pelvis showed extensive emphysematous change of the wall of the urinary bladder with a small amount of gas seen extending into the perivesicular space.  Urine culture pending  Continue antibiotic for now.           Fall  Mechanical fall.  Physical therapy evaluated her and recommended transitional care facility.  I requested a skilled nursing facility referral.    Alcohol use  Counseling provided.    Macrocytosis  She found to have macrocytosis it could be related to alcohol use.  folic acid are within normal limits  Found to have significantly elevated TSH.  Increased dose of levothyroxine.    Hypokalemia  Repleting as needed    Acute encephalopathy  Today she has abrupt change in her mental  condition.  And she was unrestrained overnight.  Mentation change most likely secondary to her urinary tract infection and hypothyroidism  Continue ceftriaxone.  Ammonia negative  Continue to monitor her closely.        Hypothyroid  She found to have significantly elevated TSH.  Levothyroxine dose has now been increased from 88 to 100    Vitamin B12 deficiency  Repleting IM and then orally      VTE prophylaxis: Lovenox

## 2019-02-13 NOTE — CARE PLAN
Problem: Safety  Goal: Will remain free from injury  Outcome: PROGRESSING AS EXPECTED  Treaded socks in place, bed in the lowest position, bed alarm on, call light and belongings within reach, pt call for assistance appropriately    Problem: Pain Management  Goal: Pain level will decrease to patient's comfort goal  Outcome: PROGRESSING AS EXPECTED  No complaints of pain at this moment, PRNs available if needed.

## 2019-02-14 NOTE — PROGRESS NOTES
patient D/C'd to [facility].  Discharge instructions provided to pt.  Pt states understanding.  Pt states all questions have been answered.  Copy of discharge provided to pt.  Signed copy in chart.  Prescriptions provided to pt, copy in chart. Pt states that all personal belongings are in possession.  Pt escorted off unit with assistance from [employer] without incident.

## 2019-02-15 NOTE — DOCUMENTATION QUERY
[facility]                                                                         Query Response Note      PATIENT:               ULYSSES PONCE  ACCT #:                  [id number]  MRN:                       [id number]  :                       1950  ADMIT DATE:       2019 1:00 PM  DISCH DATE:        2019 3:46 PM  RESPONDING  PROVIDER #:        [id number]           RESPONSE TEXT:    Other type of encephalopathy    QUERY TEXT:    Encephalopathy Type [id number]    Acute Encephalopathy is documented in the Medical Record. Please specify type.    NOTE:  If an appropriate response is not listed below, please respond with a new note.    The patient's Clinical Indicators include:   MD Note: Alert but confused. Acute Encephalopathy- likely secondary to UTI or hypothyroidism   Lab result: TSH 31.89  Treatment: Urine cx, Ceftriaxone, TSH/T4/Ammonia levels, Levothyroxine, Haldol, Lapbelt, Wrist restraints  Risk Factors: Advanced age, Alcohol use, Pelvic fracture, Macrocytosis, Cystitis  Query created by: Nicole Angela on 2019 10:50 AM        Electronically signed by:  HELENA PERLA MD 2/15/2019 12:12 PM

## 2019-02-16 LAB — VIT B1 BLD-MCNC: 118 NMOL/L (ref 70–180)

## 2019-02-18 LAB — VIT B6 SERPL-MCNC: 6.3 NMOL/L (ref 20–125)

## 2020-01-28 ENCOUNTER — HOSPITAL ENCOUNTER (INPATIENT)
Dept: HOSPITAL 8 - ED | Age: 70
LOS: 2 days | Discharge: NURSING, REHAB OR LONG-TERM CARE | DRG: 543 | End: 2020-01-30
Attending: INTERNAL MEDICINE | Admitting: INTERNAL MEDICINE
Payer: COMMERCIAL

## 2020-01-28 VITALS — BODY MASS INDEX: 20.8 KG/M2 | WEIGHT: 129.41 LBS | HEIGHT: 66 IN

## 2020-01-28 VITALS — SYSTOLIC BLOOD PRESSURE: 180 MMHG | DIASTOLIC BLOOD PRESSURE: 108 MMHG
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
## 2020-01-28 DIAGNOSIS — M80.021A: Primary | ICD-10-CM
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
## 2020-01-28 DIAGNOSIS — D63.8: ICD-10-CM

## 2020-01-28 DIAGNOSIS — E83.42: ICD-10-CM

## 2020-01-28 DIAGNOSIS — E87.6: ICD-10-CM
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
## 2020-01-28 DIAGNOSIS — Z79.899: ICD-10-CM

## 2020-01-28 DIAGNOSIS — I10: ICD-10-CM

## 2020-01-28 DIAGNOSIS — E89.0: ICD-10-CM
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
## 2020-01-28 DIAGNOSIS — E88.09: ICD-10-CM

## 2020-01-28 DIAGNOSIS — D62: ICD-10-CM

## 2020-01-28 DIAGNOSIS — Z90.89: ICD-10-CM

## 2020-01-28 LAB
ALBUMIN SERPL-MCNC: 3.3 G/DL (ref 3.4–5)
ANION GAP SERPL CALC-SCNC: 7 MMOL/L (ref 5–15)
BASOPHILS # BLD AUTO: 0.05 X10^3/UL (ref 0–0.1)
BASOPHILS NFR BLD AUTO: 1 % (ref 0–1)
CALCIUM SERPL-MCNC: 8.2 MG/DL (ref 8.5–10.1)
CHLORIDE SERPL-SCNC: 114 MMOL/L (ref 98–107)
CREAT SERPL-MCNC: 0.99 MG/DL (ref 0.55–1.02)
EOSINOPHIL # BLD AUTO: 0.01 X10^3/UL (ref 0–0.4)
EOSINOPHIL NFR BLD AUTO: 0 % (ref 1–7)
ERYTHROCYTE [DISTWIDTH] IN BLOOD BY AUTOMATED COUNT: 16.1 % (ref 9.6–15.2)
LYMPHOCYTES # BLD AUTO: 1.06 X10^3/UL (ref 1–3.4)
LYMPHOCYTES NFR BLD AUTO: 18 % (ref 22–44)
MCH RBC QN AUTO: 31.2 PG (ref 27–34.8)
MCHC RBC AUTO-ENTMCNC: 33.7 G/DL (ref 32.4–35.8)
MCV RBC AUTO: 92.6 FL (ref 80–100)
MD: NO
MONOCYTES # BLD AUTO: 0.62 X10^3/UL (ref 0.2–0.8)
MONOCYTES NFR BLD AUTO: 11 % (ref 2–9)
NEUTROPHILS # BLD AUTO: 4.02 X10^3/UL (ref 1.8–6.8)
NEUTROPHILS NFR BLD AUTO: 70 % (ref 42–75)
PLATELET # BLD AUTO: 239 X10^3/UL (ref 130–400)
PMV BLD AUTO: 7.7 FL (ref 7.4–10.4)
RBC # BLD AUTO: 3.84 X10^6/UL (ref 3.82–5.3)

## 2020-01-28 PROCEDURE — 85025 COMPLETE CBC W/AUTO DIFF WBC: CPT

## 2020-01-28 PROCEDURE — 84443 ASSAY THYROID STIM HORMONE: CPT

## 2020-01-28 PROCEDURE — 36415 COLL VENOUS BLD VENIPUNCTURE: CPT

## 2020-01-28 PROCEDURE — 96374 THER/PROPH/DIAG INJ IV PUSH: CPT

## 2020-01-28 PROCEDURE — 80048 BASIC METABOLIC PNL TOTAL CA: CPT

## 2020-01-28 PROCEDURE — 83735 ASSAY OF MAGNESIUM: CPT

## 2020-01-28 PROCEDURE — 82040 ASSAY OF SERUM ALBUMIN: CPT

## 2020-01-28 PROCEDURE — 80053 COMPREHEN METABOLIC PANEL: CPT

## 2020-01-28 RX ADMIN — ENOXAPARIN SODIUM SCH MG: 40 INJECTION SUBCUTANEOUS at 22:18

## 2020-01-28 RX ADMIN — SODIUM CHLORIDE SCH MLS/HR: 0.9 INJECTION, SOLUTION INTRAVENOUS at 18:13

## 2020-01-28 RX ADMIN — LISINOPRIL SCH MG: 10 TABLET ORAL at 22:14

## 2020-01-28 RX ADMIN — HYDROCODONE BITARTRATE AND ACETAMINOPHEN PRN TAB: 5; 325 TABLET ORAL at 22:15

## 2020-01-28 NOTE — NUR
69 YEAR OLD FEMALE THAT SUFFERED A MECHANICAL GROUND LEVEL FALL. PT FELL ON 
RIGHT ELBOW AND PRESENTED TO ER TODAY WITH SWELLING, REDNESS AND BRUISING TO 
UPPER ARM AREA. PT IS PAIN FREE WHEN NOT MOVING. LAST ORAL INTAKE WAS 8 PM LAST 
NIGHT

## 2020-01-29 VITALS — DIASTOLIC BLOOD PRESSURE: 81 MMHG | SYSTOLIC BLOOD PRESSURE: 143 MMHG

## 2020-01-29 VITALS — SYSTOLIC BLOOD PRESSURE: 98 MMHG | DIASTOLIC BLOOD PRESSURE: 57 MMHG

## 2020-01-29 VITALS — DIASTOLIC BLOOD PRESSURE: 65 MMHG | SYSTOLIC BLOOD PRESSURE: 108 MMHG

## 2020-01-29 VITALS — DIASTOLIC BLOOD PRESSURE: 50 MMHG | SYSTOLIC BLOOD PRESSURE: 99 MMHG

## 2020-01-29 LAB
ALBUMIN SERPL-MCNC: 2.6 G/DL (ref 3.4–5)
ALP SERPL-CCNC: 86 U/L (ref 45–117)
ALT SERPL-CCNC: 10 U/L (ref 12–78)
ANION GAP SERPL CALC-SCNC: 7 MMOL/L (ref 5–15)
BASOPHILS # BLD AUTO: 0.05 X10^3/UL (ref 0–0.1)
BASOPHILS NFR BLD AUTO: 1 % (ref 0–1)
BILIRUB SERPL-MCNC: 0.9 MG/DL (ref 0.2–1)
CALCIUM SERPL-MCNC: 7.8 MG/DL (ref 8.5–10.1)
CHLORIDE SERPL-SCNC: 115 MMOL/L (ref 98–107)
CREAT SERPL-MCNC: 1.05 MG/DL (ref 0.55–1.02)
EOSINOPHIL # BLD AUTO: 0.04 X10^3/UL (ref 0–0.4)
EOSINOPHIL NFR BLD AUTO: 1 % (ref 1–7)
ERYTHROCYTE [DISTWIDTH] IN BLOOD BY AUTOMATED COUNT: 15.9 % (ref 9.6–15.2)
LYMPHOCYTES # BLD AUTO: 1.74 X10^3/UL (ref 1–3.4)
LYMPHOCYTES NFR BLD AUTO: 36 % (ref 22–44)
MCH RBC QN AUTO: 31.8 PG (ref 27–34.8)
MCHC RBC AUTO-ENTMCNC: 33.9 G/DL (ref 32.4–35.8)
MCV RBC AUTO: 93.6 FL (ref 80–100)
MD: NO
MONOCYTES # BLD AUTO: 0.61 X10^3/UL (ref 0.2–0.8)
MONOCYTES NFR BLD AUTO: 13 % (ref 2–9)
NEUTROPHILS # BLD AUTO: 2.37 X10^3/UL (ref 1.8–6.8)
NEUTROPHILS NFR BLD AUTO: 49 % (ref 42–75)
PLATELET # BLD AUTO: 205 X10^3/UL (ref 130–400)
PMV BLD AUTO: 8 FL (ref 7.4–10.4)
PROT SERPL-MCNC: 6 G/DL (ref 6.4–8.2)
RBC # BLD AUTO: 3.26 X10^6/UL (ref 3.82–5.3)

## 2020-01-29 RX ADMIN — HYDROCODONE BITARTRATE AND ACETAMINOPHEN PRN TAB: 5; 325 TABLET ORAL at 08:00

## 2020-01-29 RX ADMIN — ENOXAPARIN SODIUM SCH MG: 40 INJECTION SUBCUTANEOUS at 22:29

## 2020-01-29 RX ADMIN — HYDROCODONE BITARTRATE AND ACETAMINOPHEN PRN TAB: 5; 325 TABLET ORAL at 11:57

## 2020-01-29 RX ADMIN — Medication SCH MG: at 08:00

## 2020-01-29 RX ADMIN — LEVOTHYROXINE SODIUM SCH MCG: 88 TABLET ORAL at 05:43

## 2020-01-29 RX ADMIN — Medication SCH TAB: at 08:00

## 2020-01-29 RX ADMIN — LISINOPRIL SCH MG: 10 TABLET ORAL at 08:00

## 2020-01-29 RX ADMIN — LISINOPRIL SCH MG: 10 TABLET ORAL at 20:31

## 2020-01-29 RX ADMIN — DOCUSATE SODIUM 50MG AND SENNOSIDES 8.6MG SCH TAB: 8.6; 5 TABLET, FILM COATED ORAL at 08:00

## 2020-01-29 RX ADMIN — SODIUM CHLORIDE SCH MLS/HR: 0.9 INJECTION, SOLUTION INTRAVENOUS at 10:01

## 2020-01-29 RX ADMIN — HYDROCODONE BITARTRATE AND ACETAMINOPHEN PRN TAB: 5; 325 TABLET ORAL at 20:30

## 2020-01-29 RX ADMIN — FOLIC ACID SCH MG: 1 TABLET ORAL at 08:00

## 2020-01-30 VITALS — DIASTOLIC BLOOD PRESSURE: 75 MMHG | SYSTOLIC BLOOD PRESSURE: 117 MMHG

## 2020-01-30 VITALS — SYSTOLIC BLOOD PRESSURE: 114 MMHG | DIASTOLIC BLOOD PRESSURE: 69 MMHG

## 2020-01-30 VITALS — SYSTOLIC BLOOD PRESSURE: 102 MMHG | DIASTOLIC BLOOD PRESSURE: 62 MMHG

## 2020-01-30 RX ADMIN — LEVOTHYROXINE SODIUM SCH MCG: 88 TABLET ORAL at 06:13

## 2020-01-30 RX ADMIN — Medication SCH MG: at 08:17

## 2020-01-30 RX ADMIN — HYDROCODONE BITARTRATE AND ACETAMINOPHEN PRN TAB: 5; 325 TABLET ORAL at 08:17

## 2020-01-30 RX ADMIN — LISINOPRIL SCH MG: 10 TABLET ORAL at 08:17

## 2020-01-30 RX ADMIN — HYDROCODONE BITARTRATE AND ACETAMINOPHEN PRN TAB: 5; 325 TABLET ORAL at 15:52

## 2020-01-30 RX ADMIN — HYDROCODONE BITARTRATE AND ACETAMINOPHEN PRN TAB: 5; 325 TABLET ORAL at 12:16

## 2020-01-30 RX ADMIN — DOCUSATE SODIUM 50MG AND SENNOSIDES 8.6MG SCH TAB: 8.6; 5 TABLET, FILM COATED ORAL at 08:17

## 2020-01-30 RX ADMIN — FOLIC ACID SCH MG: 1 TABLET ORAL at 08:17

## 2020-01-30 RX ADMIN — Medication SCH TAB: at 08:17

## 2020-02-11 ENCOUNTER — HOSPITAL ENCOUNTER (OUTPATIENT)
Facility: MEDICAL CENTER | Age: 70
End: 2020-02-11
Attending: ORTHOPAEDIC SURGERY | Admitting: ORTHOPAEDIC SURGERY
Payer: MEDICARE

## 2020-02-11 ENCOUNTER — APPOINTMENT (OUTPATIENT)
Dept: RADIOLOGY | Facility: MEDICAL CENTER | Age: 70
End: 2020-02-11
Attending: ORTHOPAEDIC SURGERY
Payer: MEDICARE

## 2020-02-11 ENCOUNTER — ANESTHESIA (OUTPATIENT)
Dept: SURGERY | Facility: MEDICAL CENTER | Age: 70
End: 2020-02-11
Payer: MEDICARE

## 2020-02-11 ENCOUNTER — ANESTHESIA EVENT (OUTPATIENT)
Dept: SURGERY | Facility: MEDICAL CENTER | Age: 70
End: 2020-02-11
Payer: MEDICARE

## 2020-02-11 VITALS
WEIGHT: 124.56 LBS | DIASTOLIC BLOOD PRESSURE: 75 MMHG | BODY MASS INDEX: 20.02 KG/M2 | OXYGEN SATURATION: 91 % | HEIGHT: 66 IN | SYSTOLIC BLOOD PRESSURE: 135 MMHG | TEMPERATURE: 98.4 F | RESPIRATION RATE: 18 BRPM | HEART RATE: 110 BPM

## 2020-02-11 LAB
ALBUMIN SERPL BCP-MCNC: 3.7 G/DL (ref 3.2–4.9)
ALBUMIN/GLOB SERPL: 1.1 G/DL
ALP SERPL-CCNC: 78 U/L (ref 30–99)
ALT SERPL-CCNC: 11 U/L (ref 2–50)
ANION GAP SERPL CALC-SCNC: 12 MMOL/L (ref 0–11.9)
AST SERPL-CCNC: 26 U/L (ref 12–45)
BILIRUB SERPL-MCNC: 0.5 MG/DL (ref 0.1–1.5)
BUN SERPL-MCNC: 22 MG/DL (ref 8–22)
CALCIUM SERPL-MCNC: 9.2 MG/DL (ref 8.5–10.5)
CHLORIDE SERPL-SCNC: 103 MMOL/L (ref 96–112)
CO2 SERPL-SCNC: 22 MMOL/L (ref 20–33)
CREAT SERPL-MCNC: 1.03 MG/DL (ref 0.5–1.4)
EKG IMPRESSION: NORMAL
ERYTHROCYTE [DISTWIDTH] IN BLOOD BY AUTOMATED COUNT: 54.2 FL (ref 35.9–50)
GLOBULIN SER CALC-MCNC: 3.4 G/DL (ref 1.9–3.5)
GLUCOSE SERPL-MCNC: 88 MG/DL (ref 65–99)
HCT VFR BLD AUTO: 33.9 % (ref 37–47)
HGB BLD-MCNC: 10.8 G/DL (ref 12–16)
MCH RBC QN AUTO: 31.4 PG (ref 27–33)
MCHC RBC AUTO-ENTMCNC: 31.9 G/DL (ref 33.6–35)
MCV RBC AUTO: 98.5 FL (ref 81.4–97.8)
PLATELET # BLD AUTO: 319 K/UL (ref 164–446)
PMV BLD AUTO: 9.6 FL (ref 9–12.9)
POTASSIUM SERPL-SCNC: 4.2 MMOL/L (ref 3.6–5.5)
PROT SERPL-MCNC: 7.1 G/DL (ref 6–8.2)
RBC # BLD AUTO: 3.44 M/UL (ref 4.2–5.4)
SODIUM SERPL-SCNC: 137 MMOL/L (ref 135–145)
WBC # BLD AUTO: 7.2 K/UL (ref 4.8–10.8)

## 2020-02-11 PROCEDURE — 500562 HCHG FIBERWIRE: Performed by: ORTHOPAEDIC SURGERY

## 2020-02-11 PROCEDURE — 88307 TISSUE EXAM BY PATHOLOGIST: CPT

## 2020-02-11 PROCEDURE — 160035 HCHG PACU - 1ST 60 MINS PHASE I: Performed by: ORTHOPAEDIC SURGERY

## 2020-02-11 PROCEDURE — 700105 HCHG RX REV CODE 258: Performed by: ORTHOPAEDIC SURGERY

## 2020-02-11 PROCEDURE — 160002 HCHG RECOVERY MINUTES (STAT): Performed by: ORTHOPAEDIC SURGERY

## 2020-02-11 PROCEDURE — 80053 COMPREHEN METABOLIC PANEL: CPT

## 2020-02-11 PROCEDURE — 88311 DECALCIFY TISSUE: CPT

## 2020-02-11 PROCEDURE — 160025 RECOVERY II MINUTES (STATS): Performed by: ORTHOPAEDIC SURGERY

## 2020-02-11 PROCEDURE — A4565 SLINGS: HCPCS | Performed by: ORTHOPAEDIC SURGERY

## 2020-02-11 PROCEDURE — 700111 HCHG RX REV CODE 636 W/ 250 OVERRIDE (IP): Performed by: ANESTHESIOLOGY

## 2020-02-11 PROCEDURE — 500424 HCHG DRESSING, AIRSTRIP: Performed by: ORTHOPAEDIC SURGERY

## 2020-02-11 PROCEDURE — 160047 HCHG PACU  - EA ADDL 30 MINS PHASE II: Performed by: ORTHOPAEDIC SURGERY

## 2020-02-11 PROCEDURE — 500380 HCHG DRAIN, PENROSE 1/4X12: Performed by: ORTHOPAEDIC SURGERY

## 2020-02-11 PROCEDURE — 500891 HCHG PACK, ORTHO MAJOR: Performed by: ORTHOPAEDIC SURGERY

## 2020-02-11 PROCEDURE — 501445 HCHG STAPLER, SKIN DISP: Performed by: ORTHOPAEDIC SURGERY

## 2020-02-11 PROCEDURE — 160048 HCHG OR STATISTICAL LEVEL 1-5: Performed by: ORTHOPAEDIC SURGERY

## 2020-02-11 PROCEDURE — 64415 NJX AA&/STRD BRCH PLXS IMG: CPT | Performed by: ORTHOPAEDIC SURGERY

## 2020-02-11 PROCEDURE — 85027 COMPLETE CBC AUTOMATED: CPT

## 2020-02-11 PROCEDURE — 93005 ELECTROCARDIOGRAM TRACING: CPT | Performed by: ORTHOPAEDIC SURGERY

## 2020-02-11 PROCEDURE — 160046 HCHG PACU - 1ST 60 MINS PHASE II: Performed by: ORTHOPAEDIC SURGERY

## 2020-02-11 PROCEDURE — 160041 HCHG SURGERY MINUTES - EA ADDL 1 MIN LEVEL 4: Performed by: ORTHOPAEDIC SURGERY

## 2020-02-11 PROCEDURE — 93010 ELECTROCARDIOGRAM REPORT: CPT | Performed by: INTERNAL MEDICINE

## 2020-02-11 PROCEDURE — C1713 ANCHOR/SCREW BN/BN,TIS/BN: HCPCS | Performed by: ORTHOPAEDIC SURGERY

## 2020-02-11 PROCEDURE — 502000 HCHG MISC OR IMPLANTS RC 0278: Performed by: ORTHOPAEDIC SURGERY

## 2020-02-11 PROCEDURE — 160009 HCHG ANES TIME/MIN: Performed by: ORTHOPAEDIC SURGERY

## 2020-02-11 PROCEDURE — 160029 HCHG SURGERY MINUTES - 1ST 30 MINS LEVEL 4: Performed by: ORTHOPAEDIC SURGERY

## 2020-02-11 PROCEDURE — 700101 HCHG RX REV CODE 250: Performed by: ANESTHESIOLOGY

## 2020-02-11 PROCEDURE — 500881 HCHG PACK, EXTREMITY: Performed by: ORTHOPAEDIC SURGERY

## 2020-02-11 PROCEDURE — 36415 COLL VENOUS BLD VENIPUNCTURE: CPT

## 2020-02-11 PROCEDURE — 501838 HCHG SUTURE GENERAL: Performed by: ORTHOPAEDIC SURGERY

## 2020-02-11 PROCEDURE — 73060 X-RAY EXAM OF HUMERUS: CPT | Mod: RT

## 2020-02-11 PROCEDURE — A6222 GAUZE <=16 IN NO W/SAL W/O B: HCPCS | Performed by: ORTHOPAEDIC SURGERY

## 2020-02-11 DEVICE — SCREW CORT 3.5X26MM ST HEX (4TX6+1TX4+1TX3=31)(SDS=22): Type: IMPLANTABLE DEVICE | Site: HUMERUS | Status: FUNCTIONAL

## 2020-02-11 DEVICE — SCREW CORT 3.5X22MM ST HEX (4TX6+1TX4+1TX3=31)(SDS=22): Type: IMPLANTABLE DEVICE | Site: HUMERUS | Status: FUNCTIONAL

## 2020-02-11 DEVICE — SCREW LOCK 3.5X42MM ST - (4SFLX8=32) W/STARDRIVE: Type: IMPLANTABLE DEVICE | Site: HUMERUS | Status: FUNCTIONAL

## 2020-02-11 DEVICE — SCREW LOCK 3.5X36MM ST T15 - (4SFLX6+LPPELVX4=28): Type: IMPLANTABLE DEVICE | Site: HUMERUS | Status: FUNCTIONAL

## 2020-02-11 DEVICE — PLATE 3.5 LCK PRXHUM LONG 8H - (LPHX1=1) 196MM: Type: IMPLANTABLE DEVICE | Site: HUMERUS | Status: FUNCTIONAL

## 2020-02-11 DEVICE — SCREW LOCK 3.5X44MM ST - (4SFLX8=32) W/STARDRIVE: Type: IMPLANTABLE DEVICE | Site: HUMERUS | Status: FUNCTIONAL

## 2020-02-11 DEVICE — SCREW LOCK 3.5X40MM ST T15 - (4SFLX8+LPPELVX4=36): Type: IMPLANTABLE DEVICE | Site: HUMERUS | Status: FUNCTIONAL

## 2020-02-11 DEVICE — SCREW LOCK 3.5X24MM ST T15 - (4SFLX6+LPPELVX4=28): Type: IMPLANTABLE DEVICE | Site: HUMERUS | Status: FUNCTIONAL

## 2020-02-11 DEVICE — SCREW LOCK 3.5X45MM ST T15 - (4SFLX8+LPPELVX2=34): Type: IMPLANTABLE DEVICE | Site: HUMERUS | Status: FUNCTIONAL

## 2020-02-11 DEVICE — IMPLANTABLE DEVICE: Type: IMPLANTABLE DEVICE | Site: HUMERUS | Status: FUNCTIONAL

## 2020-02-11 RX ORDER — CEFTRIAXONE 1 G/1
1 INJECTION, POWDER, FOR SOLUTION INTRAMUSCULAR; INTRAVENOUS DAILY
COMMUNITY

## 2020-02-11 RX ORDER — HALOPERIDOL 5 MG/ML
1 INJECTION INTRAMUSCULAR
Status: DISCONTINUED | OUTPATIENT
Start: 2020-02-11 | End: 2020-02-11 | Stop reason: HOSPADM

## 2020-02-11 RX ORDER — ONDANSETRON 2 MG/ML
INJECTION INTRAMUSCULAR; INTRAVENOUS PRN
Status: DISCONTINUED | OUTPATIENT
Start: 2020-02-11 | End: 2020-02-11 | Stop reason: SURG

## 2020-02-11 RX ORDER — CEFAZOLIN SODIUM 1 G/3ML
INJECTION, POWDER, FOR SOLUTION INTRAMUSCULAR; INTRAVENOUS PRN
Status: DISCONTINUED | OUTPATIENT
Start: 2020-02-11 | End: 2020-02-11

## 2020-02-11 RX ORDER — ONDANSETRON 2 MG/ML
4 INJECTION INTRAMUSCULAR; INTRAVENOUS
Status: DISCONTINUED | OUTPATIENT
Start: 2020-02-11 | End: 2020-02-11 | Stop reason: HOSPADM

## 2020-02-11 RX ORDER — DEXAMETHASONE SODIUM PHOSPHATE 4 MG/ML
INJECTION, SOLUTION INTRA-ARTICULAR; INTRALESIONAL; INTRAMUSCULAR; INTRAVENOUS; SOFT TISSUE PRN
Status: DISCONTINUED | OUTPATIENT
Start: 2020-02-11 | End: 2020-02-11 | Stop reason: SURG

## 2020-02-11 RX ORDER — DIPHENHYDRAMINE HYDROCHLORIDE 50 MG/ML
12.5 INJECTION INTRAMUSCULAR; INTRAVENOUS
Status: DISCONTINUED | OUTPATIENT
Start: 2020-02-11 | End: 2020-02-11 | Stop reason: HOSPADM

## 2020-02-11 RX ORDER — ACETAMINOPHEN 325 MG/1
650 TABLET ORAL EVERY 4 HOURS PRN
COMMUNITY

## 2020-02-11 RX ORDER — FOLIC ACID 1 MG/1
1 TABLET ORAL DAILY
COMMUNITY

## 2020-02-11 RX ORDER — CEFAZOLIN SODIUM 1 G/3ML
INJECTION, POWDER, FOR SOLUTION INTRAMUSCULAR; INTRAVENOUS PRN
Status: DISCONTINUED | OUTPATIENT
Start: 2020-02-11 | End: 2020-02-11 | Stop reason: SURG

## 2020-02-11 RX ORDER — THIAMINE MONONITRATE (VIT B1) 100 MG
100 TABLET ORAL DAILY
COMMUNITY

## 2020-02-11 RX ORDER — MAGNESIUM OXIDE 400 MG/1
400 TABLET ORAL 2 TIMES DAILY
COMMUNITY

## 2020-02-11 RX ORDER — OXYCODONE HCL 5 MG/5 ML
10 SOLUTION, ORAL ORAL
Status: DISCONTINUED | OUTPATIENT
Start: 2020-02-11 | End: 2020-02-11 | Stop reason: HOSPADM

## 2020-02-11 RX ORDER — HYDROCODONE BITARTRATE AND ACETAMINOPHEN 5; 325 MG/1; MG/1
1-2 TABLET ORAL EVERY 6 HOURS PRN
COMMUNITY

## 2020-02-11 RX ORDER — HYDRALAZINE HYDROCHLORIDE 20 MG/ML
5 INJECTION INTRAMUSCULAR; INTRAVENOUS
Status: DISCONTINUED | OUTPATIENT
Start: 2020-02-11 | End: 2020-02-11 | Stop reason: HOSPADM

## 2020-02-11 RX ORDER — SODIUM CHLORIDE, SODIUM LACTATE, POTASSIUM CHLORIDE, CALCIUM CHLORIDE 600; 310; 30; 20 MG/100ML; MG/100ML; MG/100ML; MG/100ML
INJECTION, SOLUTION INTRAVENOUS CONTINUOUS
Status: DISCONTINUED | OUTPATIENT
Start: 2020-02-11 | End: 2020-02-11 | Stop reason: HOSPADM

## 2020-02-11 RX ORDER — ASPIRIN 325 MG
325 TABLET ORAL 2 TIMES DAILY
COMMUNITY

## 2020-02-11 RX ORDER — OXYCODONE HCL 5 MG/5 ML
5 SOLUTION, ORAL ORAL
Status: DISCONTINUED | OUTPATIENT
Start: 2020-02-11 | End: 2020-02-11 | Stop reason: HOSPADM

## 2020-02-11 RX ORDER — LISINOPRIL 10 MG/1
10 TABLET ORAL DAILY
COMMUNITY

## 2020-02-11 RX ORDER — MIDAZOLAM HYDROCHLORIDE 1 MG/ML
1 INJECTION INTRAMUSCULAR; INTRAVENOUS
Status: DISCONTINUED | OUTPATIENT
Start: 2020-02-11 | End: 2020-02-11 | Stop reason: HOSPADM

## 2020-02-11 RX ORDER — MEPERIDINE HYDROCHLORIDE 25 MG/ML
12.5 INJECTION INTRAMUSCULAR; INTRAVENOUS; SUBCUTANEOUS
Status: DISCONTINUED | OUTPATIENT
Start: 2020-02-11 | End: 2020-02-11 | Stop reason: HOSPADM

## 2020-02-11 RX ADMIN — CEFAZOLIN 2 G: 330 INJECTION, POWDER, FOR SOLUTION INTRAMUSCULAR; INTRAVENOUS at 16:54

## 2020-02-11 RX ADMIN — ONDANSETRON 4 MG: 2 INJECTION INTRAMUSCULAR; INTRAVENOUS at 18:04

## 2020-02-11 RX ADMIN — ROPIVACAINE HYDROCHLORIDE 25 ML: 5 INJECTION, SOLUTION EPIDURAL; INFILTRATION; PERINEURAL at 16:31

## 2020-02-11 RX ADMIN — EPHEDRINE SULFATE 10 MG: 50 INJECTION, SOLUTION INTRAVENOUS at 17:09

## 2020-02-11 RX ADMIN — SODIUM CHLORIDE, POTASSIUM CHLORIDE, SODIUM LACTATE AND CALCIUM CHLORIDE 1000 ML: 600; 310; 30; 20 INJECTION, SOLUTION INTRAVENOUS at 13:12

## 2020-02-11 RX ADMIN — MIDAZOLAM HYDROCHLORIDE 1 MG: 1 INJECTION, SOLUTION INTRAMUSCULAR; INTRAVENOUS at 16:25

## 2020-02-11 RX ADMIN — DEXAMETHASONE SODIUM PHOSPHATE 7 MG: 4 INJECTION, SOLUTION INTRA-ARTICULAR; INTRALESIONAL; INTRAMUSCULAR; INTRAVENOUS; SOFT TISSUE at 17:04

## 2020-02-11 RX ADMIN — PROPOFOL 130 MG: 10 INJECTION, EMULSION INTRAVENOUS at 16:44

## 2020-02-11 RX ADMIN — LIDOCAINE HYDROCHLORIDE 50 MG: 20 INJECTION, SOLUTION INTRAVENOUS at 16:44

## 2020-02-11 RX ADMIN — SODIUM CHLORIDE, POTASSIUM CHLORIDE, SODIUM LACTATE AND CALCIUM CHLORIDE: 600; 310; 30; 20 INJECTION, SOLUTION INTRAVENOUS at 09:42

## 2020-02-11 RX ADMIN — DEXAMETHASONE SODIUM PHOSPHATE 1 MG: 4 INJECTION, SOLUTION INTRA-ARTICULAR; INTRALESIONAL; INTRAMUSCULAR; INTRAVENOUS; SOFT TISSUE at 16:31

## 2020-02-11 ASSESSMENT — PAIN SCALES - GENERAL: PAIN_LEVEL: 0

## 2020-02-11 NOTE — DISCHARGE PLANNING
Received a call from Patience PANDYA in preop regarding getting paperwork for advanced directive. Pt s.o. Triston requesting. Pt is from [facility].  Met with pt and Bill at bedside and asked if they wanted me to go over paperwork and they stated no they wanted to look at it later. Left vm for SW at [place].

## 2020-02-11 NOTE — OR NURSING
Patient arrives to preop, states that she ate earlier today, unsure of what time, had coffee with cream, eggs, and cookies and possibly more. MD updated and patient will be placed NPO and case delayed till later this PM. Patient arrived from [facility] with PIV to left arm, not drawing or flushing, removed PIV. Attempted to get another PIV, vein blew. Contacted vascular access team and lab to draw labs.     Triple AIM completed, EKG completed. Patient's significant other is at bedside. Call light within reach.

## 2020-02-12 LAB — PATHOLOGY CONSULT NOTE: NORMAL

## 2020-02-12 NOTE — ANESTHESIA POSTPROCEDURE EVALUATION
Patient: Shannon Balbuena    Procedure Summary     Date:  02/11/20 Room / Location:  [place] [address] / SURGERY [facility]    Anesthesia Start:  1640 Anesthesia Stop:  1820    Procedure:  ORIF, FRACTURE, HUMERUS - PROXIMAL AND OTHER INDICATED PROCEDURES (Right Shoulder) Diagnosis:  (CLOSED FRACTURE PROXIMAL HUMERUS NECK)    Surgeon:  Norberto Collins M.D. Responsible Provider:  Nabor Lovett M.D.    Anesthesia Type:  general ASA Status:  2          Final Anesthesia Type: general  Last vitals  BP   Blood Pressure : (!) 172/88    Temp   37.2 °C (99 °F)    Pulse   Pulse: (!) 110   Resp   (!) 23    SpO2   99 %      Anesthesia Post Evaluation    Patient location during evaluation: PACU  Patient participation: complete - patient participated  Level of consciousness: awake and alert  Pain score: 0    Airway patency: patent  Anesthetic complications: no  Cardiovascular status: hemodynamically stable  Respiratory status: acceptable  Hydration status: euvolemic    PONV: none           Nurse Pain Score: 0 (NPRS)

## 2020-02-12 NOTE — PROGRESS NOTES
Report given to Felecia PNADYA at [facility], will call [facility] back with transport time  When known

## 2020-02-12 NOTE — ANESTHESIA TIME REPORT
Anesthesia Start and Stop Event Times     Date Time Event    2/11/2020 1620 Ready for Procedure     1640 Anesthesia Start     1820 Anesthesia Stop        Responsible Staff  02/11/20    Name Role Begin End    Nabor Lovett M.D. Anesth 1640 1820        Preop Diagnosis (Free Text):  Pre-op Diagnosis     CLOSED FRACTURE PROXIMAL HUMERUS NECK        Preop Diagnosis (Codes):    Post op Diagnosis  Humerus shaft fracture      Premium Reason  A. 3PM - 7AM    Comments:

## 2020-02-12 NOTE — OR NURSING
Pt A&OX3, disoriented to time. Forgetful, pleasant. VSS. Pt on room air. RUE incision dressing CDI. Ice pack in place. RUE in sling. No complaints of pain as pt received nerve block. States RUE numb. No complaints of nausea, tolerated sips of water. Report called to MUMTAZ Giron. Pt out of PACU to Phase II via lisa.

## 2020-02-12 NOTE — PROGRESS NOTES
Aless[facility] here to pickup pt, report given,  PIV left in place , [facility] aware, will let receiving RN be aware and can d/c IV upon arrival, discharged back to [facility]

## 2020-02-12 NOTE — ANESTHESIA PROCEDURE NOTES
Airway  Date/Time: 2/11/2020 4:45 PM  Performed by: Nabor Lovett M.D.  Authorized by: Nabor Lovett M.D.     Location:  OR  Urgency:  Elective  Difficult Airway: No    Indications for Airway Management:  Anesthesia  Spontaneous Ventilation: absent    Sedation Level:  Deep  Preoxygenated: Yes    Final Airway Type:  Supraglottic airway  Final Supraglottic Airway:  Standard LMA  SGA Size:  3  Number of Attempts at Approach:  1  Number of Other Approaches Attempted:  0

## 2020-02-12 NOTE — PROGRESS NOTES
Received from recovery, pt awake and alert, drsg c/d/i to [facility], sling in place,  2+ rad pulse, fingers warm and pink, not able to move them yet, pt had block in OR, denies pain at this time.  Tolerating PO, telephone call placed to [facility] and was informed by them that they do not have transportation to  pt. Call placed to evening

## 2020-02-12 NOTE — DISCHARGE INSTRUCTIONS
ACTIVITY: Rest and take it easy for the first 24 hours.  A responsible adult is recommended to remain with you during that time.  It is normal to feel sleepy.  We encourage you to not do anything that requires balance, judgment or coordination.    MILD FLU-LIKE SYMPTOMS ARE NORMAL. YOU MAY EXPERIENCE GENERALIZED MUSCLE ACHES, THROAT IRRITATION, HEADACHE AND/OR SOME NAUSEA.    FOR 24 HOURS DO NOT:  Drive, operate machinery or run household appliances.  Drink beer or alcoholic beverages.   Make important decisions or sign legal documents.    SPECIAL INSTRUCTIONS:   --Non Weight Esmond Right Upper Extremity   --Sling for comfort  --Keep incision clean/dry/covered, okay to change dressing after 72hrs if needed   --Okay for gentle shoulder and elbow ROM with PT  --Follow up in 2 weeks postop as scheduled  --Postop regional anesthesia instructions for SNF, okay to resume norco for postop pain      DIET: To avoid nausea, slowly advance diet as tolerated, avoiding spicy or greasy foods for the first day.  Add more substantial food to your diet according to your physician's instructions.  Babies can be fed formula or breast milk as soon as they are hungry.  INCREASE FLUIDS AND FIBER TO AVOID CONSTIPATION.    SURGICAL DRESSING/BATHING: Okay for gentle shoulder and elbow ROM with PT    FOLLOW-UP APPOINTMENT:  A follow-up appointment should be arranged with your doctor in 1-2 weeks; call to schedule.    You should CALL YOUR PHYSICIAN if you develop:  Fever greater than 101 degrees F.  Pain not relieved by medication, or persistent nausea or vomiting.  Excessive bleeding (blood soaking through dressing) or unexpected drainage from the wound.  Extreme redness or swelling around the incision site, drainage of pus or foul smelling drainage.  Inability to urinate or empty your bladder within 8 hours.  Problems with breathing or chest pain.    You should call 911 if you develop problems with breathing or chest pain.  If you are  unable to contact your doctor or surgical center, you should go to the nearest emergency room or urgent care center.    Physician's telephone #: [phone]    If any questions arise, call your doctor.  If your doctor is not available, please feel free to call the Surgical Center at [phone].  The Center is open Monday through Friday from 7AM to 7PM.  You can also call the HEALTH HOTLINE open 24 hours/day, 7 days/week and speak to a nurse at [phone], or toll free at [phone].    A registered nurse may call you a few days after your surgery to see how you are doing after your procedure.    MEDICATIONS: Resume taking daily medication.  Take prescribed pain medication with food.  If no medication is prescribed, you may take non-aspirin pain medication if needed.  PAIN MEDICATION CAN BE VERY CONSTIPATING.  Take a stool softener or laxative such as senokot, pericolace, or milk of magnesia if needed.    No prescription given.  No pain medication given.    If your physician has prescribed pain medication that includes Acetaminophen (Tylenol), do not take additional Acetaminophen (Tylenol) while taking the prescribed medication.    Depression / Suicide Risk    As you are discharged from this [facility] facility, it is important to learn how to keep safe from harming yourself.    Recognize the warning signs:  · Abrupt changes in personality, positive or negative- including increase in energy   · Giving away possessions  · Change in eating patterns- significant weight changes-  positive or negative  · Change in sleeping patterns- unable to sleep or sleeping all the time   · Unwillingness or inability to communicate  · Depression  · Unusual sadness, discouragement and loneliness  · Talk of wanting to die  · Neglect of personal appearance   · Rebelliousness- reckless behavior  · Withdrawal from people/activities they love  · Confusion- inability to concentrate     If you or a loved one observes any of  these behaviors or has concerns about self-harm, here's what you can do:  · Talk about it- your feelings and reasons for harming yourself  · Remove any means that you might use to hurt yourself (examples: pills, rope, extension cords, firearm)  · Get professional help from the community (Mental Health, Substance Abuse, psychological counseling)  · Do not be alone:Call your Safe Contact- someone whom you trust who will be there for you.  · Call your local CRISIS HOTLINE 873-6421 or 464-386-2186  · Call your local Children's Mobile Crisis Response Team [place] (253) 796-2937 or www.[employer]  · Call the toll free National Suicide Prevention Hotlines   · National Suicide Prevention Lifeline 745-324-OTWY (2648)  · National Hope Line Network 800-SUICIDE (605-0002)

## 2020-02-12 NOTE — DISCHARGE PLANNING
Medical Social Work    MSW received report from House SW Danika that pt needs transport back to [facility].  Due to surgery today (02/11/2020) pt will need [facility] transport (humerous fracture/surgery and medicated).  MSW contacted [facility] at [facility] who will accept the pt back; room 20A; receiving physician Dr. Carrol Meza.  MSW faxed PCS and facesheet to [facility] and made follow up phone call to [facility] to arrange transport for 2030.  Bedside RN and receiving facility aware of transport time.

## 2020-02-12 NOTE — ANESTHESIA PREPROCEDURE EVALUATION
Relevant Problems   CARDIAC   (+) Essential hypertension         (+) ARF (acute renal failure) (HCC)   (+) Chronic hepatitis C virus infection (HCC)      ENDO   (+) Hypothyroid       Physical Exam    Airway   Mallampati: II  TM distance: >3 FB  Neck ROM: full       Cardiovascular - normal exam  Rhythm: regular  Rate: normal  (-) murmur     Dental - normal exam         Pulmonary - normal exam  Breath sounds clear to auscultation     Abdominal    Neurological - normal exam                 Anesthesia Plan    ASA 2       Plan - general       Airway plan will be LMA        Induction: intravenous    Postoperative Plan: Postoperative administration of opioids is intended.    Pertinent diagnostic labs and testing reviewed    Informed Consent:    Anesthetic plan and risks discussed with patient.    Use of blood products discussed with: patient whom consented to blood products.

## 2020-02-12 NOTE — OR SURGEON
Immediate Post OP Note    PreOp Diagnosis: Right proximal third humerus shaft fracture    PostOp Diagnosis: same    Procedure(s):  ORIF, FRACTURE, HUMERUS - PROXIMAL AND OTHER INDICATED PROCEDURES - Wound Class: Clean    Surgeon(s):  Norberto Collins M.D.    Anesthesiologist/Type of Anesthesia:  Anesthesiologist: Nabor Lovett M.D./General    Surgical Staff:  Assistant: José Miguel Rios P.A.-C.  Circulator: Paz Boswell R.N.  Scrub Person: Ricardo Esquivel    Specimens removed if any:  ID Type Source Tests Collected by Time Destination   A : Right humerus tissue  Other Other PATHOLOGY SPECIMEN Norberto Collins M.D. 2/11/2020  5:22 PM        Estimated Blood Loss: 100cc    Findings: see dictation    Complications: none known    PLAN:  --discharge to SNF postop  --KENY CHARLTON, sling for comfort  --fu 2 weeks postop as scheduled        2/11/2020 6:13 PM Norberto Collins M.D.

## 2020-02-12 NOTE — ANESTHESIA PROCEDURE NOTES
Peripheral Block  Date/Time: 2/11/2020 4:24 PM  Performed by: Nabor Lovett M.D.  Authorized by: Nabor Lovett M.D.     Patient Location:  Pre-op  Start Time:  2/11/2020 4:24 PM  End Time:  2/11/2020 4:33 PM  Reason for Block: at surgeon's request and post-op pain management    patient identified, IV checked, site marked, risks and benefits discussed, surgical consent, monitors and equipment checked, pre-op evaluation and timeout performed    Patient Position:  Supine  Prep: ChloraPrep    Monitoring:  Heart rate, continuous pulse ox and cardiac monitor  Block Region:  Upper Extremity  Upper Extremity - Block Type:  BRACHIAL PLEXUS block, Interscalene approach    Laterality:  Right  Procedures: ultrasound guided  Image captured, interpreted and electronically stored.  Local Infiltration:  Lidocaine  Strength:  1 %  Dose:  3 ml  Block Type:  Single-shot  Needle Length:  100mm  Needle Gauge:  21 G  Needle Localization:  Ultrasound guidance  Injection Assessment:  Negative aspiration for heme, no paresthesia on injection, incremental injection and local visualized surrounding nerve on ultrasound  Evidence of intravascular injection: No

## 2020-02-12 NOTE — OP REPORT
DATE OF SERVICE:  02/11/2020    PREOPERATIVE DIAGNOSIS:  Right proximal third comminuted humerus shaft   fracture.    POSTOPERATIVE DIAGNOSIS:  Right proximal third comminuted humerus shaft   fracture.    PROCEDURE PERFORMED:  Open treatment with internal fixation, right proximal   third humerus shaft fracture.    SURGEON:  Norberto Collins MD.    ANESTHESIOLOGIST:  Nabor Lovett MD.    ANESTHESIA:  General and regional.    ASSISTANT:  José Miguel Rios PA-C.    ESTIMATED BLOOD LOSS:  150 mL.    IMPLANTS:  Synthes 8-hole proximal humerus locking plate with a combination of   locking and nonlocking 3.5 mm screws.    INDICATION FOR PROCEDURE:  The patient is a 69-year-old female.  Around 2-3   weeks ago, she sustained a fall.  She was seen at [facility] and   admitted and ultimately transferred to a skilled nursing facility.  My   colleague, Dr. Cotter, evaluated her initially and followed up in clinic and   asked if I would be available for definitive treatment recommendations.  I met   the patient on an outpatient basis and given her fracture displacement,   comminution and other comorbidities, I felt she was at significant risk for   developing nonunion.  I discussed this with the patient as well as her   significant other preoperatively and I felt that she would benefit from   surgical fixation and other indicated procedures likely with allograft bone   grafting.  She signed informed consent preoperatively and she wished to   proceed with surgery as outlined above.    DESCRIPTION OF PROCEDURE:  Patient was met in the preop holding area and her   surgical site was signed and consent was confirmed to be accurate.  She was   taken back to the operating room and general anesthesia was induced after Dr. Lovett performed a regional anesthetic at my request to help with   postoperative pain control.  The right upper extremity was provisionally   cleansed with alcohol.  It was then prepped and draped in  the usual sterile   fashion.  A formal timeout was performed to confirm patient's correct name,   correct surgical site, correct procedure and correct laterality.    Deltopectoral approach extending through an anterior approach to the humerus   was performed with a scalpel down through skin.  Dissection was carried with   Bovie cautery through subcutaneous tissue.  The cephalic vein was identified,   mobilized, and protected.  It was traversing relatively laterally.  The   deltopectoral interval was bluntly developed.  I mobilized the biceps muscle   medially and split the lateral third of the brachialis directly down to the   lateral humerus.  I exposed the fracture site.  There was liquefied hematoma   present.  There was some early soft callus formation.  Given the fracture   pattern, I did send some tissue to the lab just to rule out the low likelihood   that she has metastatic disease from some unknown source, but just given the   degree of bone loss, comminution and findings which may have actually just   been related to early soft callus formation, but there was some fibrinous   material present.  I then released a portion of the deltoid insertion   laterally to allow placement of an 8-hole proximal humerus locking plate.    After slightly contouring it, I placed it on the bone, fixed it proximally   with several 1.6 mm K-wires and used a reduction clamp to clamp the bone   distally confirming that the radial nerve was protected and posterior to the   plate at the distal aspect of the plate under direct visualization.  I then   placed several locking screws in the proximal segment and 2 bicortical   nonlocking screws distally, 2 more locking screws distally and several more   locking screws proximally.  Fluoroscopic imaging confirmed overall acceptable   alignment of the fracture and acceptable position of the implants despite the   comminution.  I had prepared the fracture ends with a rongeur to get to    healthy bleeding bony surfaces.  I then packed the fracture site where there   was a bone void with medium DePuy ViviGen allograft to help stimulate bony   healing given her high risk for developing nonunion.  The wound had been   thoroughly irrigated prior to insertion of the bone graft.  I then   reapproximated the biceps fascia, the deltopectoral interval with 0 Vicryl,   subcutaneous layers with 0 Vicryl, 2-0 Vicryl, and skin edges with staples.    The wounds were thoroughly cleansed and dried and a sterile dressing was   applied.  She was then awoken from anesthesia and transferred on the [facility]   and taken to postanesthesia care unit in stable condition.    PLAN:  1.  Patient will be discharged back to her skilled nursing facility  postop.  2.  She should be nonweightbearing to the right upper extremity with sling for   comfort, but can start progressive shoulder and elbow range of motion.  3.  I will see her 2 weeks postop as scheduled for routine wound check and   staple removal.       ____________________________________     MD MATHEUS Pablo / MAN    DD:  02/11/2020 18:20:57  DT:  02/11/2020 18:54:17    D#:  [id number]  Job#:  [id number]

## 2020-06-09 NOTE — ED NOTES
"Pts friend at bedside states that pt \"drinks to much and doesn't eat\" pt denies at this time, states she drinks occasionally   " 43

## 2020-09-08 ENCOUNTER — HOSPITAL ENCOUNTER (OUTPATIENT)
Dept: HOSPITAL 8 - CFH | Age: 70
Discharge: HOME | End: 2020-09-08
Attending: FAMILY MEDICINE
Payer: MEDICARE

## 2020-09-08 DIAGNOSIS — N95.8: Primary | ICD-10-CM

## 2020-09-08 DIAGNOSIS — M85.80: ICD-10-CM

## 2020-09-08 PROCEDURE — 77080 DXA BONE DENSITY AXIAL: CPT

## 2021-01-15 DIAGNOSIS — Z23 NEED FOR VACCINATION: ICD-10-CM
